# Patient Record
Sex: MALE | Race: WHITE | NOT HISPANIC OR LATINO | Employment: PART TIME | URBAN - METROPOLITAN AREA
[De-identification: names, ages, dates, MRNs, and addresses within clinical notes are randomized per-mention and may not be internally consistent; named-entity substitution may affect disease eponyms.]

---

## 2019-05-08 ENCOUNTER — HOSPITAL ENCOUNTER (EMERGENCY)
Facility: CLINIC | Age: 24
Discharge: HOME | End: 2019-05-08
Attending: EMERGENCY MEDICINE | Admitting: EMERGENCY MEDICINE
Payer: MEDICAID

## 2019-05-08 VITALS
OXYGEN SATURATION: 96 % | TEMPERATURE: 97.3 F | SYSTOLIC BLOOD PRESSURE: 138 MMHG | WEIGHT: 133 LBS | RESPIRATION RATE: 16 BRPM | HEART RATE: 119 BPM | DIASTOLIC BLOOD PRESSURE: 80 MMHG

## 2019-05-08 DIAGNOSIS — F11.10 OPIOID ABUSE (H): ICD-10-CM

## 2019-05-08 DIAGNOSIS — F10.930 ALCOHOL WITHDRAWAL SYNDROME WITHOUT COMPLICATION (H): ICD-10-CM

## 2019-05-08 LAB
ALCOHOL BREATH TEST: 0 (ref 0–0.01)
AMPHETAMINES UR QL SCN: NEGATIVE
BARBITURATES UR QL: NEGATIVE
BENZODIAZ UR QL: POSITIVE
CANNABINOIDS UR QL SCN: NEGATIVE
COCAINE UR QL: NEGATIVE
ETHANOL UR QL SCN: NEGATIVE
OPIATES UR QL SCN: POSITIVE

## 2019-05-08 PROCEDURE — 25000132 ZZH RX MED GY IP 250 OP 250 PS 637: Performed by: EMERGENCY MEDICINE

## 2019-05-08 PROCEDURE — 82075 ASSAY OF BREATH ETHANOL: CPT

## 2019-05-08 PROCEDURE — 80307 DRUG TEST PRSMV CHEM ANLYZR: CPT | Performed by: EMERGENCY MEDICINE

## 2019-05-08 PROCEDURE — 99283 EMERGENCY DEPT VISIT LOW MDM: CPT | Mod: Z6 | Performed by: EMERGENCY MEDICINE

## 2019-05-08 PROCEDURE — 99283 EMERGENCY DEPT VISIT LOW MDM: CPT

## 2019-05-08 PROCEDURE — 80320 DRUG SCREEN QUANTALCOHOLS: CPT | Performed by: EMERGENCY MEDICINE

## 2019-05-08 RX ORDER — LANOLIN ALCOHOL/MO/W.PET/CERES
100 CREAM (GRAM) TOPICAL ONCE
Status: COMPLETED | OUTPATIENT
Start: 2019-05-08 | End: 2019-05-08

## 2019-05-08 RX ORDER — FOLIC ACID 1 MG/1
1 TABLET ORAL ONCE
Status: COMPLETED | OUTPATIENT
Start: 2019-05-08 | End: 2019-05-08

## 2019-05-08 RX ORDER — MULTIPLE VITAMINS W/ MINERALS TAB 9MG-400MCG
1 TAB ORAL ONCE
Status: COMPLETED | OUTPATIENT
Start: 2019-05-08 | End: 2019-05-08

## 2019-05-08 RX ADMIN — MULTIPLE VITAMINS W/ MINERALS TAB 1 TABLET: TAB at 19:24

## 2019-05-08 RX ADMIN — Medication 100 MG: at 19:24

## 2019-05-08 RX ADMIN — FOLIC ACID 1 MG: 1 TABLET ORAL at 19:24

## 2019-05-08 ASSESSMENT — ENCOUNTER SYMPTOMS
FEVER: 0
ABDOMINAL PAIN: 0
SEIZURES: 0
SHORTNESS OF BREATH: 0

## 2019-05-08 NOTE — ED NOTES
Pt reports an allergy of a medication that causes hives, pt does not know the name of the medication. (started with an 'M' per pt).

## 2019-05-08 NOTE — ED AVS SNAPSHOT
[facility], Emergency Department  [address]  [facility] [address]  Phone:  [phone]  Fax:  [phone]                                    Jaime Mccormick   MRN: [id number]    Department:  [facility], Emergency Department   Date of Visit:  5/8/2019           After Visit Summary Signature Page    I have received my discharge instructions, and my questions have been answered. I have discussed any challenges I see with this plan with the nurse or doctor.    ..........................................................................................................................................  Patient/Patient Representative Signature      ..........................................................................................................................................  Patient Representative Print Name and Relationship to Patient    ..................................................               ................................................  Date                                   Time    ..........................................................................................................................................  Reviewed by Signature/Title    ...................................................              ..............................................  Date                                               Time          22EPIC Rev 08/18

## 2019-05-09 ENCOUNTER — HOSPITAL ENCOUNTER (INPATIENT)
Facility: CLINIC | Age: 24
LOS: 5 days | Discharge: PSYCHIATRIC OR SUBSTANCE USE FACILITY | End: 2019-05-14
Attending: FAMILY MEDICINE | Admitting: PSYCHIATRY & NEUROLOGY
Payer: MEDICAID

## 2019-05-09 ENCOUNTER — TELEPHONE (OUTPATIENT)
Dept: BEHAVIORAL HEALTH | Facility: CLINIC | Age: 24
End: 2019-05-09

## 2019-05-09 DIAGNOSIS — F10.239 ALCOHOL DEPENDENCE WITH WITHDRAWAL WITH COMPLICATION (H): ICD-10-CM

## 2019-05-09 DIAGNOSIS — F11.20 UNCOMPLICATED OPIOID DEPENDENCE (H): ICD-10-CM

## 2019-05-09 PROBLEM — F10.939 ALCOHOL WITHDRAWAL (H): Status: ACTIVE | Noted: 2019-05-09

## 2019-05-09 LAB
ALBUMIN SERPL-MCNC: 4.5 G/DL (ref 3.4–5)
ALCOHOL BREATH TEST: 0 (ref 0–0.01)
ALP SERPL-CCNC: 100 U/L (ref 40–150)
ALT SERPL W P-5'-P-CCNC: 59 U/L (ref 0–70)
AMPHETAMINES UR QL SCN: NEGATIVE
ANION GAP SERPL CALCULATED.3IONS-SCNC: 9 MMOL/L (ref 3–14)
AST SERPL W P-5'-P-CCNC: 51 U/L (ref 0–45)
BARBITURATES UR QL: NEGATIVE
BASOPHILS # BLD AUTO: 0 10E9/L (ref 0–0.2)
BASOPHILS NFR BLD AUTO: 0.2 %
BENZODIAZ UR QL: POSITIVE
BILIRUB SERPL-MCNC: 0.4 MG/DL (ref 0.2–1.3)
BUN SERPL-MCNC: 7 MG/DL (ref 7–30)
CALCIUM SERPL-MCNC: 9.2 MG/DL (ref 8.5–10.1)
CANNABINOIDS UR QL SCN: NEGATIVE
CHLORIDE SERPL-SCNC: 101 MMOL/L (ref 94–109)
CO2 SERPL-SCNC: 28 MMOL/L (ref 20–32)
COCAINE UR QL: NEGATIVE
CREAT SERPL-MCNC: 0.78 MG/DL (ref 0.66–1.25)
DIFFERENTIAL METHOD BLD: ABNORMAL
EOSINOPHIL # BLD AUTO: 0.1 10E9/L (ref 0–0.7)
EOSINOPHIL NFR BLD AUTO: 0.5 %
ERYTHROCYTE [DISTWIDTH] IN BLOOD BY AUTOMATED COUNT: 13.1 % (ref 10–15)
ETHANOL UR QL SCN: NEGATIVE
GFR SERPL CREATININE-BSD FRML MDRD: >90 ML/MIN/{1.73_M2}
GGT SERPL-CCNC: 289 U/L (ref 0–75)
GLUCOSE SERPL-MCNC: 77 MG/DL (ref 70–99)
HCT VFR BLD AUTO: 37.6 % (ref 40–53)
HGB BLD-MCNC: 12.8 G/DL (ref 13.3–17.7)
IMM GRANULOCYTES # BLD: 0 10E9/L (ref 0–0.4)
IMM GRANULOCYTES NFR BLD: 0.3 %
LYMPHOCYTES # BLD AUTO: 2 10E9/L (ref 0.8–5.3)
LYMPHOCYTES NFR BLD AUTO: 17.3 %
MCH RBC QN AUTO: 33.7 PG (ref 26.5–33)
MCHC RBC AUTO-ENTMCNC: 34 G/DL (ref 31.5–36.5)
MCV RBC AUTO: 99 FL (ref 78–100)
MONOCYTES # BLD AUTO: 1.1 10E9/L (ref 0–1.3)
MONOCYTES NFR BLD AUTO: 10.1 %
NEUTROPHILS # BLD AUTO: 8.1 10E9/L (ref 1.6–8.3)
NEUTROPHILS NFR BLD AUTO: 71.6 %
NRBC # BLD AUTO: 0 10*3/UL
NRBC BLD AUTO-RTO: 0 /100
OPIATES UR QL SCN: POSITIVE
PLATELET # BLD AUTO: 254 10E9/L (ref 150–450)
POTASSIUM SERPL-SCNC: 3.4 MMOL/L (ref 3.4–5.3)
PROT SERPL-MCNC: 7.9 G/DL (ref 6.8–8.8)
RBC # BLD AUTO: 3.8 10E12/L (ref 4.4–5.9)
SODIUM SERPL-SCNC: 138 MMOL/L (ref 133–144)
TSH SERPL DL<=0.005 MIU/L-ACNC: 1.19 MU/L (ref 0.4–4)
WBC # BLD AUTO: 11.3 10E9/L (ref 4–11)

## 2019-05-09 PROCEDURE — 80307 DRUG TEST PRSMV CHEM ANLYZR: CPT | Performed by: FAMILY MEDICINE

## 2019-05-09 PROCEDURE — 85025 COMPLETE CBC W/AUTO DIFF WBC: CPT | Performed by: FAMILY MEDICINE

## 2019-05-09 PROCEDURE — 25000132 ZZH RX MED GY IP 250 OP 250 PS 637: Performed by: PSYCHIATRY & NEUROLOGY

## 2019-05-09 PROCEDURE — 82977 ASSAY OF GGT: CPT | Performed by: FAMILY MEDICINE

## 2019-05-09 PROCEDURE — 25000128 H RX IP 250 OP 636: Performed by: FAMILY MEDICINE

## 2019-05-09 PROCEDURE — 96376 TX/PRO/DX INJ SAME DRUG ADON: CPT | Performed by: FAMILY MEDICINE

## 2019-05-09 PROCEDURE — 80320 DRUG SCREEN QUANTALCOHOLS: CPT | Performed by: FAMILY MEDICINE

## 2019-05-09 PROCEDURE — 96361 HYDRATE IV INFUSION ADD-ON: CPT | Performed by: FAMILY MEDICINE

## 2019-05-09 PROCEDURE — 96374 THER/PROPH/DIAG INJ IV PUSH: CPT | Performed by: FAMILY MEDICINE

## 2019-05-09 PROCEDURE — 12800008 ZZH R&B CD ADULT

## 2019-05-09 PROCEDURE — 25800030 ZZH RX IP 258 OP 636: Performed by: FAMILY MEDICINE

## 2019-05-09 PROCEDURE — 99285 EMERGENCY DEPT VISIT HI MDM: CPT | Mod: Z6 | Performed by: FAMILY MEDICINE

## 2019-05-09 PROCEDURE — HZ2ZZZZ DETOXIFICATION SERVICES FOR SUBSTANCE ABUSE TREATMENT: ICD-10-PCS | Performed by: PSYCHIATRY & NEUROLOGY

## 2019-05-09 PROCEDURE — 84443 ASSAY THYROID STIM HORMONE: CPT | Performed by: FAMILY MEDICINE

## 2019-05-09 PROCEDURE — 80053 COMPREHEN METABOLIC PANEL: CPT | Performed by: FAMILY MEDICINE

## 2019-05-09 PROCEDURE — 99285 EMERGENCY DEPT VISIT HI MDM: CPT | Mod: 25 | Performed by: FAMILY MEDICINE

## 2019-05-09 RX ORDER — NALOXONE HYDROCHLORIDE 0.4 MG/ML
.1-.4 INJECTION, SOLUTION INTRAMUSCULAR; INTRAVENOUS; SUBCUTANEOUS
Status: DISCONTINUED | OUTPATIENT
Start: 2019-05-09 | End: 2019-05-14 | Stop reason: HOSPADM

## 2019-05-09 RX ORDER — LORAZEPAM 2 MG/ML
1 INJECTION INTRAMUSCULAR ONCE
Status: COMPLETED | OUTPATIENT
Start: 2019-05-09 | End: 2019-05-09

## 2019-05-09 RX ORDER — NICOTINE 21 MG/24HR
1 PATCH, TRANSDERMAL 24 HOURS TRANSDERMAL DAILY
Status: DISCONTINUED | OUTPATIENT
Start: 2019-05-09 | End: 2019-05-09 | Stop reason: CLARIF

## 2019-05-09 RX ORDER — DIAZEPAM 5 MG
5-20 TABLET ORAL EVERY 30 MIN PRN
Status: DISCONTINUED | OUTPATIENT
Start: 2019-05-09 | End: 2019-05-14 | Stop reason: HOSPADM

## 2019-05-09 RX ORDER — LANOLIN ALCOHOL/MO/W.PET/CERES
100 CREAM (GRAM) TOPICAL DAILY
Status: DISCONTINUED | OUTPATIENT
Start: 2019-05-10 | End: 2019-05-14 | Stop reason: HOSPADM

## 2019-05-09 RX ORDER — ACETAMINOPHEN 325 MG/1
650 TABLET ORAL EVERY 4 HOURS PRN
Status: DISCONTINUED | OUTPATIENT
Start: 2019-05-09 | End: 2019-05-14 | Stop reason: HOSPADM

## 2019-05-09 RX ORDER — TRAZODONE HYDROCHLORIDE 50 MG/1
50 TABLET, FILM COATED ORAL
Status: DISCONTINUED | OUTPATIENT
Start: 2019-05-09 | End: 2019-05-14 | Stop reason: HOSPADM

## 2019-05-09 RX ORDER — ALUMINA, MAGNESIA, AND SIMETHICONE 2400; 2400; 240 MG/30ML; MG/30ML; MG/30ML
30 SUSPENSION ORAL EVERY 4 HOURS PRN
Status: DISCONTINUED | OUTPATIENT
Start: 2019-05-09 | End: 2019-05-14 | Stop reason: HOSPADM

## 2019-05-09 RX ORDER — LOPERAMIDE HCL 2 MG
2 CAPSULE ORAL 4 TIMES DAILY PRN
Status: DISCONTINUED | OUTPATIENT
Start: 2019-05-09 | End: 2019-05-14 | Stop reason: HOSPADM

## 2019-05-09 RX ORDER — HYDROXYZINE HYDROCHLORIDE 25 MG/1
25 TABLET, FILM COATED ORAL EVERY 4 HOURS PRN
Status: DISCONTINUED | OUTPATIENT
Start: 2019-05-09 | End: 2019-05-14 | Stop reason: HOSPADM

## 2019-05-09 RX ORDER — BUPRENORPHINE 2 MG/1
2 TABLET SUBLINGUAL 4 TIMES DAILY
Status: DISCONTINUED | OUTPATIENT
Start: 2019-05-09 | End: 2019-05-13

## 2019-05-09 RX ORDER — ONDANSETRON 4 MG/1
4 TABLET, ORALLY DISINTEGRATING ORAL EVERY 6 HOURS PRN
Status: DISCONTINUED | OUTPATIENT
Start: 2019-05-09 | End: 2019-05-14 | Stop reason: HOSPADM

## 2019-05-09 RX ORDER — MULTIPLE VITAMINS W/ MINERALS TAB 9MG-400MCG
1 TAB ORAL DAILY
Status: DISCONTINUED | OUTPATIENT
Start: 2019-05-10 | End: 2019-05-14 | Stop reason: HOSPADM

## 2019-05-09 RX ORDER — IBUPROFEN 600 MG/1
600 TABLET, FILM COATED ORAL EVERY 6 HOURS PRN
Status: DISCONTINUED | OUTPATIENT
Start: 2019-05-09 | End: 2019-05-14 | Stop reason: HOSPADM

## 2019-05-09 RX ORDER — BISACODYL 10 MG
10 SUPPOSITORY, RECTAL RECTAL DAILY PRN
Status: DISCONTINUED | OUTPATIENT
Start: 2019-05-09 | End: 2019-05-14 | Stop reason: HOSPADM

## 2019-05-09 RX ORDER — SODIUM CHLORIDE 9 MG/ML
1000 INJECTION, SOLUTION INTRAVENOUS CONTINUOUS
Status: DISCONTINUED | OUTPATIENT
Start: 2019-05-09 | End: 2019-05-09 | Stop reason: CLARIF

## 2019-05-09 RX ORDER — FOLIC ACID 1 MG/1
1 TABLET ORAL DAILY
Status: DISCONTINUED | OUTPATIENT
Start: 2019-05-10 | End: 2019-05-14 | Stop reason: HOSPADM

## 2019-05-09 RX ADMIN — DIAZEPAM 10 MG: 5 TABLET ORAL at 20:21

## 2019-05-09 RX ADMIN — DIAZEPAM 10 MG: 5 TABLET ORAL at 17:20

## 2019-05-09 RX ADMIN — BUPRENORPHINE HCL 2 MG: 2 TABLET SUBLINGUAL at 20:21

## 2019-05-09 RX ADMIN — LORAZEPAM 1 MG: 2 INJECTION INTRAMUSCULAR; INTRAVENOUS at 13:07

## 2019-05-09 RX ADMIN — BUPRENORPHINE HCL 2 MG: 2 TABLET SUBLINGUAL at 17:05

## 2019-05-09 RX ADMIN — SODIUM CHLORIDE 1000 ML: 9 INJECTION, SOLUTION INTRAVENOUS at 13:06

## 2019-05-09 RX ADMIN — LORAZEPAM 1 MG: 2 INJECTION INTRAMUSCULAR; INTRAVENOUS at 14:57

## 2019-05-09 RX ADMIN — SODIUM CHLORIDE 1000 ML: 9 INJECTION, SOLUTION INTRAVENOUS at 14:02

## 2019-05-09 RX ADMIN — NICOTINE 1 PATCH: 14 PATCH, EXTENDED RELEASE TRANSDERMAL at 17:19

## 2019-05-09 ASSESSMENT — ACTIVITIES OF DAILY LIVING (ADL)
DRESS: INDEPENDENT
ORAL_HYGIENE: INDEPENDENT
HYGIENE/GROOMING: INDEPENDENT

## 2019-05-09 ASSESSMENT — ENCOUNTER SYMPTOMS: NAUSEA: 0

## 2019-05-09 NOTE — PROGRESS NOTES
05/09/19 1559   Patient Belongings   Did you bring any home meds/supplements to the hospital?  No   Patient Belongings other (see comments)   Belongings Search Yes   Clothing Search Yes   Second Staff Omer     STORAGE BIN:   duffle bag, shorts, sweatshirt, 3 pants, shoes, cigarettes, wallet, belt, papers, pants with string,    MED ROOM BIN:   cell phone,  cord    SECURITY:   [place] id, ck for $25  A             Admission:  I am responsible for any personal items that are not sent to the safe or pharmacy.  [facility] is not responsible for loss, theft or damage of any property in my possession.    Signature:  _________________________________ Date: _______  Time: _____                                              Staff Signature:  ____________________________ Date: ________  Time: _____      2nd Staff person, if patient is unable/unwilling to sign:    Signature: ________________________________ Date: ________  Time: _____   Discharge:  [facility] has returned all of my personal belongings:    Signature: _________________________________ Date: ________  Time: _____                                          Staff Signature:  ____________________________ Date: ________  Time: _____

## 2019-05-09 NOTE — ED PROVIDER NOTES
[facility] EMERGENCY DEPARTMENT ([facility])     May 9, 2019    History     Chief Complaint   Patient presents with     Drug / Alcohol Assessment     Heroin snorts, uses 2 gm per day.  Last used yesterday @ about 1400.  Alcohol last drink 2 days ago and states 2.5 liter split between 2 people.  Deneis any other drugs.     HPI  Jaime Mccormick is a 24 year old male who presents the ED seeking detox from alcohol and heroin.  Patient that he has been getting 2 g of heroin daily for the past 2 years.  He states his last use was yesterday 1 month p.m.  He also reports that he has been drinking 2.5 L of boxed wine daily for the past 1.5 years.  He denies previous history of alcohol withdrawal seizures.  States his last drink was 2 days ago.  He otherwise denies nausea, other medical problems, or other substance use.    Per chart review, patient was seen in the ED yesterday again seeking detox, but there are no beds available, and patient was not interested in going to [facility] or [address] [place] [address] for detox.  PAST MEDICAL HISTORY  Past Medical History:   Diagnosis Date     Anxiety      Chronic kidney disease      Depressive disorder      PAST SURGICAL HISTORY  History reviewed. No pertinent surgical history.  FAMILY HISTORY  No family history on file.  SOCIAL HISTORY  Social History     Tobacco Use     Smoking status: Current Every Day Smoker     Packs/day: 0.50     Smokeless tobacco: Never Used   Substance Use Topics     Alcohol use: Yes     MEDICATIONS  Current Facility-Administered Medications   Medication     acetaminophen (TYLENOL) tablet 650 mg     alum & mag hydroxide-simethicone (MYLANTA ES/MAALOX  ES) suspension 30 mL     atenolol (TENORMIN) tablet 50 mg     bisacodyl (DULCOLAX) Suppository 10 mg     buprenorphine (SUBUTEX) sublingual tablet 2 mg     diazepam (VALIUM) tablet 5-20 mg     escitalopram (LEXAPRO) tablet 10 mg     folic acid (FOLVITE) tablet 1 mg     hydrOXYzine (ATARAX) tablet 25 mg      ibuprofen (ADVIL/MOTRIN) tablet 600 mg     loperamide (IMODIUM) capsule 2 mg     magnesium hydroxide (MILK OF MAGNESIA) suspension 30 mL     multivitamin w/minerals (THERA-VIT-M) tablet 1 tablet     naloxone (NARCAN) injection 0.1-0.4 mg     nicotine (NICORETTE) gum 2-4 mg     ondansetron (ZOFRAN-ODT) ODT tab 4 mg     traZODone (DESYREL) tablet 50 mg     vitamin B1 (THIAMINE) tablet 100 mg     ALLERGIES  Allergies   Allergen Reactions     Amoxicillin Hives         I have reviewed the Medications, Allergies, Past Medical and Surgical History, and Social History in the Epic system.    Review of Systems   Gastrointestinal: Negative for nausea.   All other systems reviewed and are negative.      Physical Exam   BP: (!) 175/107  Pulse: 138  Heart Rate: 142  Temp: 99.7  F (37.6  C)  Resp: 20  Weight: 59.5 kg (131 lb 3 oz)  SpO2: 100 %      Physical Exam   Constitutional: He is oriented to person, place, and time. No distress.   HENT:   Head: Atraumatic.   Mouth/Throat: Oropharynx is clear and moist.   Eyes: Pupils are equal, round, and reactive to light. No scleral icterus.   Cardiovascular: Normal heart sounds and intact distal pulses.   Pulmonary/Chest: Breath sounds normal. No respiratory distress.   Abdominal: Soft. Bowel sounds are normal. There is no tenderness.   Musculoskeletal: He exhibits no edema or tenderness.   Neurological: He is alert and oriented to person, place, and time. He exhibits normal muscle tone. Coordination normal.   Skin: Skin is warm. No rash noted. He is not diaphoretic.   Psychiatric: His mood appears anxious.       ED Course        Procedures   12:37 PM  The patient was seen and examined by Dr. Wilkes in Room 7.            Critical Care time:  none        Results for orders placed or performed during the hospital encounter of 05/09/19   CBC with platelets differential   Result Value Ref Range    WBC 11.3 (H) 4.0 - 11.0 10e9/L    RBC Count 3.80 (L) 4.4 - 5.9 10e12/L    Hemoglobin 12.8  (L) 13.3 - 17.7 g/dL    Hematocrit 37.6 (L) 40.0 - 53.0 %    MCV 99 78 - 100 fl    MCH 33.7 (H) 26.5 - 33.0 pg    MCHC 34.0 31.5 - 36.5 g/dL    RDW 13.1 10.0 - 15.0 %    Platelet Count 254 150 - 450 10e9/L    Diff Method Automated Method     % Neutrophils 71.6 %    % Lymphocytes 17.3 %    % Monocytes 10.1 %    % Eosinophils 0.5 %    % Basophils 0.2 %    % Immature Granulocytes 0.3 %    Nucleated RBCs 0 0 /100    Absolute Neutrophil 8.1 1.6 - 8.3 10e9/L    Absolute Lymphocytes 2.0 0.8 - 5.3 10e9/L    Absolute Monocytes 1.1 0.0 - 1.3 10e9/L    Absolute Eosinophils 0.1 0.0 - 0.7 10e9/L    Absolute Basophils 0.0 0.0 - 0.2 10e9/L    Abs Immature Granulocytes 0.0 0 - 0.4 10e9/L    Absolute Nucleated RBC 0.0    Comprehensive metabolic panel   Result Value Ref Range    Sodium 138 133 - 144 mmol/L    Potassium 3.4 3.4 - 5.3 mmol/L    Chloride 101 94 - 109 mmol/L    Carbon Dioxide 28 20 - 32 mmol/L    Anion Gap 9 3 - 14 mmol/L    Glucose 77 70 - 99 mg/dL    Urea Nitrogen 7 7 - 30 mg/dL    Creatinine 0.78 0.66 - 1.25 mg/dL    GFR Estimate >90 >60 mL/min/[1.73_m2]    GFR Estimate If Black >90 >60 mL/min/[1.73_m2]    Calcium 9.2 8.5 - 10.1 mg/dL    Bilirubin Total 0.4 0.2 - 1.3 mg/dL    Albumin 4.5 3.4 - 5.0 g/dL    Protein Total 7.9 6.8 - 8.8 g/dL    Alkaline Phosphatase 100 40 - 150 U/L    ALT 59 0 - 70 U/L    AST 51 (H) 0 - 45 U/L   TSH   Result Value Ref Range    TSH 1.19 0.40 - 4.00 mU/L   Drug abuse screen 6 urine (tox)   Result Value Ref Range    Amphetamine Qual Urine Negative NEG^Negative    Barbiturates Qual Urine Negative NEG^Negative    Benzodiazepine Qual Urine Positive (A) NEG^Negative    Cannabinoids Qual Urine Negative NEG^Negative    Cocaine Qual Urine Negative NEG^Negative    Ethanol Qual Urine Negative NEG^Negative    Opiates Qualitative Urine Positive (A) NEG^Negative   GGT   Result Value Ref Range     (H) 0 - 75 U/L   Internal Medicine Adult IP Consult for BEH Detox on 3A: Patient to be seen: Routine  within 24 hrs; Call back #: [phone]; comanage alcohol withdrawal; Consultant may enter orders: Yes; Requesting provider? Other supervising provider; Name: Dr Edu Banks, Dilma Vera PA-C     5/10/2019 10:01 AM  [facility] [place]  Consult Note - Hospitalist Service     Date of Admission:  5/9/2019  Consult Requested by: psychiatry  Reason for Consult: detox    Assessment & Plan   Jaime Mccormick is a 24 year old male with a hx of   polysubstance abuse admitted to 3A seeking detox from heroin and   meth.     Alcohol abuse and withdrawal. Consuming 2.5L boxed wine daily for   >1 year. No hx of alcohol withdrawal seizures or DTs. Agree with   MSSA, folic acid, MV, and thiamine as you are.     Heroin abuse and withdrawal. Using 2g heroin daily for >2 years.   Denies IVDU.     Tachycardia. Reg rhythm on exam. Likely 2/2 withdrawal. No cp,   sob, dyspnea, palpitations.     Mild transminitis.  and AST 51 c/w alcohol use. ALT, AP,   and Tbili wnl. No need to monitor further while inpatient.     Mild leukocytosis. WBC 11.3. No s/sx of infection. Likely 2/2   acute stress in setting of withdrawal.     Dilma Banks PA-C  [facility] [place]    __________________________________________________________________  ____    Chief Complaint   Alcohol and heroin abuse    History of Present Illness   Jaime Mccormick is a 24 year old male with a hx of   polysubstance abuse admitted to 3A seeking detox from heroin and   meth. Patient has been snorting/smoking heroin daily for 2 or so   years. He denies any IVDU. He has also been drinking alcohol   daily for over 1 year. He denies hx of seizures or DTs from   withdrawal. He is currently going through withdrawals and does   not feel well. He is not eating right now but tolerating liquids.   His heart rate has been fast, but he denies any chest pain,   palpitations, sob, or  dyspnea. He has no hx or cardiac,   pulmonary, or endocrine disease. He has no new rashes, urinary   symptoms, cough, sore throat, etc c/w infection. No other acute   concerns at this time.     Review of Systems   The 10 point Review of Systems is negative other than noted in   the HPI or here.     Past Medical History    I have reviewed this patient's medical history and updated it   with pertinent information if needed.   Past Medical History:   Diagnosis Date     Anxiety      Chronic kidney disease      Depressive disorder        Past Surgical History   I have reviewed this patient's surgical history and updated it   with pertinent information if needed.  History reviewed. No pertinent surgical history.    Social History   I have reviewed this patient's social history and updated it with   pertinent information if needed.  Social History     Tobacco Use     Smoking status: Current Every Day Smoker     Packs/day: 0.50     Smokeless tobacco: Never Used   Substance Use Topics     Alcohol use: Yes     Drug use: Yes     Types: Opiates       Family History   Fam hx reviewed, non contributory    Medications   I have reviewed this patient's current medications    Allergies   Allergies   Allergen Reactions     Amoxicillin Hives       Physical Exam   Vital Signs: Temp: 98.6  F (37  C) Temp src: Oral BP: (!) 137/92   Pulse: 126 Heart Rate: 94 Resp: 16 SpO2: 100 % O2 Device: None   (Room air)    Weight: 131 lbs 3 oz  GENERAL: Alert and oriented x 3. NAD. Appears in mod withdrawal.  HEENT: MMM  CV: tachycardic, regular rhythm  RESPIRATORY: Effort normal on RA. Lungs CTAB.  GI: Abdomen soft and non distended with normoactive bowel sounds   present in all quadrants. No tenderness, rebound, guarding.   NEUROLOGICAL: No focal deficits. Moves all extremities. Mild   tremors in UE.  SKIN: No jaundice or rashes on exposed areas of skin.     Alcohol breath test POCT   Result Value Ref Range    Alcohol Breath Test 0.00 0.00 - 0.01               Assessments & Plan (with Medical Decision Making)       I have reviewed the nursing notes.    I have reviewed the findings, diagnosis, plan and need for follow up with the patient.    Patient with alcohol dependence as well as heroin dependence at this time will be seeking detox will be admitted to station 3 a for further evaluation and stabilization.    Final diagnoses:   Alcohol dependence with withdrawal with complication (H)   Uncomplicated opioid dependence (H)     IDavid, am serving as a trained medical scribe to document services personally performed by Branden Wilkes MD, based on the provider's statements to me.      Branden RODRIGUEZ MD, was physically present and have reviewed and verified the accuracy of this note documented by David Burroughs    5/9/2019   [facility] [place], EMERGENCY DEPARTMENT     Branden Wilkes MD  05/10/19 [id number]

## 2019-05-09 NOTE — ED NOTES
ED to Behavioral Floor Handoff    SITUATION  Jaime Mccormick is a 24 year old male who speaks English and lives in a home unknown The patient arrived in the ED by private car from home with a complaint of Drug / Alcohol Assessment (Heroin snorts, uses 2 gm per day.  Last used yesterday @ about 1400.  Alcohol last drink 2 days ago and states 2.5 liter split between 2 people.  Deneis any other drugs.)  .The patient's current symptoms started/worsened 2 day(s) ago and during this time the symptoms have increased.   In the ED, pt was diagnosed with   Final diagnoses:   None        Initial vitals were: BP: (!) 175/107  Pulse: 138  Heart Rate: 142  Temp: 99.7  F (37.6  C)  Resp: 20  Weight: 59.5 kg (131 lb 3 oz)  SpO2: 100 %   --------  Is the patient diabetic? No   If yes, last blood glucose? --     If yes, was this treated in the ED? --  --------  Is the patient inebriated (ETOH) No or Impaired on other substances? No  MSSA done? Yes  Last MSSA score: --    Were withdrawal symptoms treated? Yes  Does the patient have a seizure history? No. If yes, date of most recent seizure--  --------  Is the patient patient experiencing suicidal ideation? denies current or recent suicidal ideation     Homicidal ideation? denies current or recent homicidal ideation or behaviors.    Self-injurious behavior/urges? denies current or recent self injurious behavior or ideation.  ------  Was pt aggressive in the ED No  Was a code called No  Is the pt now cooperative? Yes  -------  Meds given in ED:   Medications   0.9% sodium chloride BOLUS (0 mLs Intravenous Stopped 5/9/19 1358)     Followed by   sodium chloride 0.9% infusion (1,000 mLs Intravenous New Bag 5/9/19 1402)   LORazepam (ATIVAN) injection 1 mg (1 mg Intravenous Given 5/9/19 1307)   LORazepam (ATIVAN) injection 1 mg (1 mg Intravenous Given 5/9/19 1457)      Family present during ED course? No  Family currently present? No    BACKGROUND  Does the patient have a cognitive  impairment or developmental disability? No  Allergies:   Allergies   Allergen Reactions     Amoxicillin Hives   .   Social demographics are   Social History     Socioeconomic History     Marital status: Single     Spouse name: None     Number of children: None     Years of education: None     Highest education level: None   Occupational History     None   Social Needs     Financial resource strain: None     Food insecurity:     Worry: None     Inability: None     Transportation needs:     Medical: None     Non-medical: None   Tobacco Use     Smoking status: Current Every Day Smoker     Packs/day: 0.50     Smokeless tobacco: Never Used   Substance and Sexual Activity     Alcohol use: Yes     Drug use: Yes     Types: Opiates     Sexual activity: None   Lifestyle     Physical activity:     Days per week: None     Minutes per session: None     Stress: None   Relationships     Social connections:     Talks on phone: None     Gets together: None     Attends [employer] service: None     Active member of club or organization: None     Attends meetings of clubs or organizations: None     Relationship status: None     Intimate partner violence:     Fear of current or ex partner: None     Emotionally abused: None     Physically abused: None     Forced sexual activity: None   Other Topics Concern     None   Social History Narrative     None        ASSESSMENT  Labs results   Labs Ordered and Resulted from Time of ED Arrival Up to the Time of Departure from the ED   CBC WITH PLATELETS DIFFERENTIAL - Abnormal; Notable for the following components:       Result Value    WBC 11.3 (*)     RBC Count 3.80 (*)     Hemoglobin 12.8 (*)     Hematocrit 37.6 (*)     MCH 33.7 (*)     All other components within normal limits   COMPREHENSIVE METABOLIC PANEL - Abnormal; Notable for the following components:    AST 51 (*)     All other components within normal limits   ALCOHOL BREATH TEST POCT - Normal   TSH      Imaging Studies: No results found  for this or any previous visit (from the past 24 hour(s)).   Most recent vital signs BP (!) 131/105   Pulse 99   Temp 98.6  F (37  C) (Oral)   Resp 18   Wt 59.5 kg (131 lb 3 oz)   SpO2 100%    Abnormal labs/tests/findings requiring intervention:---   Pain control: pt had none  Nausea control: pt had none    RECOMMENDATION  Are any infection precautions needed (MRSA, VRE, etc.)? No If yes, what infection? --  ---  Does the patient have mobility issues? independently. If yes, what device does the pt use? ---  ---  Is patient on 72 hour hold or commitment? No If on 72 hour hold, have hold and rights been given to patient? N/A  Are admitting orders written if after 10 p.m. ?N/A  Tasks needing to be completed:---     Beti Peter   [facility]-- [phone]   [phone] [facility] ED

## 2019-05-09 NOTE — ED PROVIDER NOTES
[facility] ([facility])    5/08/19       History     Chief Complaint   Patient presents with     Addiction Problem     Requesting a detox bed for Heroin (2 grams snorts daily- last use was 1 hour before arrival). ETOH (2.5 Liters daily- last drink this morning). Denies seizures.      The history is provided by the patient.     Jaime Mccormick is a 24 year old male who presents to the Emergency Department seeking detox from alcohol and heroin.  Patient snorts 2 g of heroin a day.  He reports that his last use was approximately 1 hour prior to arrival.  The patient also drinks approximately 2.5 L of wine a day with his last drink earlier this afternoon.  The patient denies any history of withdrawal seizures.  He denies any suicidal ideation.  The patient does note that he occasionally uses Xanax as well.  He reports that he used a half a tablet yesterday and a small amount this morning at approximately 9 AM.    Social: Patient presents with his romantic partner, who is also here seeking detox in the Emergency Department.  Patient is currently unemployed.    I have reviewed the Medications, Allergies, Past Medical and Surgical History, and Social History in the Epic system.    History reviewed. No pertinent past medical history.    History reviewed. No pertinent surgical history.    History reviewed. No pertinent family history.    Social History     Tobacco Use     Smoking status: Current Every Day Smoker     Smokeless tobacco: Never Used   Substance Use Topics     Alcohol use: Yes       No current facility-administered medications for this encounter.      No current outpatient medications on file.        Allergies   Allergen Reactions     Amoxicillin Hives         Review of Systems   Constitutional: Negative for fever.   Respiratory: Negative for shortness of breath.    Cardiovascular: Negative for chest pain.   Gastrointestinal: Negative for abdominal pain.   Neurological: Negative  for seizures.   Psychiatric/Behavioral: Negative for suicidal ideas.   All other systems reviewed and are negative.      Physical Exam   BP: 136/77  Pulse: 119  Temp: 98.4  F (36.9  C)  Resp: 16  Weight: 60.3 kg (133 lb)  SpO2: 95 %      Physical Exam   Physical Exam   Constitutional:   well nourished, well developed, resting comfortably   HENT:   Head: Normocephalic and atraumatic.   Eyes: Conjunctivae are normal. Pupils are equal, round, and reactive to light.   pharynx has no erythema or exudate, mucous membranes are moist  Neck:   no adenopathy, no bony tenderness  Cardiovascular: tachycardia without murmurs or gallops  Pulmonary/Chest: Clear to auscultation bilaterally, with no wheezes or retractions. No respiratory distress.  GI: Soft with good bowel sounds.  Non-tender, non-distended, with no guarding, no rebound, no peritoneal signs.   Back:  No bony or CVA tenderness   Musculoskeletal:  no edema or clubbing   Skin: Skin is warm and dry.   Neurological: alert and oriented to person, place, and time. Nonfocal exam, tremulous  Psychiatric:  normal mood and affect.      ED Course   7:08 PM  The patient was seen and examined by Lanette Bailey MD in Room ED16C.        Procedures             Critical Care time:  none             Labs Ordered and Resulted from Time of ED Arrival Up to the Time of Departure from the ED   ALCOHOL BREATH TEST POCT - Normal   DRUG ABUSE SCREEN 6 CHEM DEP URINE ([facility])     Results for orders placed or performed during the hospital encounter of 05/08/19   Alcohol breath test POCT   Result Value Ref Range    Alcohol Breath Test 0.000 0.00 - 0.01           Assessments & Plan (with Medical Decision Making)       I have reviewed the nursing notes.  Emergency Department course:  The patient was seen and examined at 1910 pm.  Breathalyzer is 0.00.    The patient is tremulous and appears to be in alcohol withdrawal.  Unfortunately, there are no detox beds available here at [facility].   There was a bed available at [facility].  However, the patient did not want to go to [facility] detox.  He would like to be discharged home and try again tomorrow for detox bed here.  The patient is not intoxicated here and is able to make his own decisions.    Patient is a 24-year-old male with a history of alcohol and opiate abuse here for detox.  There are no detox beds available at [facility].  He does not want to go to [facility] detox or [address] [place] [address] detox.  He will be discharged home.  I gave him the phone number to mental health intake and he should keep calling to hold a detox bed.  He should return to the ED for concerns.  I have reviewed the findings, diagnosis, plan and need for follow up with the patient.       Medication List      There are no discharge medications for this visit.         Final diagnoses:   Alcohol withdrawal syndrome without complication (H)   Opioid abuse (H)     I, James Leiva, am serving as a trained medical scribe to document services personally performed by Lanette Bailey MD, based on the provider's statements to me.   Lanette RODRIGUEZ MD, was physically present and have reviewed and verified the accuracy of this note documented by James Leiva.  This note was created in part by the use of Dragon voice recognition dictation system. Inadvertent grammatical errors and typographical errors may still exist.  Lanette Bailey MD    5/8/2019   [facility], EMERGENCY DEPARTMENT     Lanette Bailey MD  05/09/19 0012

## 2019-05-09 NOTE — DISCHARGE INSTRUCTIONS
Call Mental Health Intake at [phone] to hold a Detox bed.  You may need to call several times until there is a bed available.  You were offered a detox bed at [facility] and did not want it.  Please return at any time for concerns

## 2019-05-09 NOTE — TELEPHONE ENCOUNTER
S: Dr. Christianson calling with clinical on this 24 year old male in [place] ED presenting for heroin and alcohol detox.     B: Pt snorts 2 grams of heroin per day, last use yesterday mid afternoon. Pt has been snorting heroin daily for approximately the past 2 years. Pt is also drinking alcohol on a daily basis and drinks approximately 1.25 L of wine daily. Last drink 2 days ago. Pt also abuses Xanax on occasion. Pt is tremulous in the ED and will receive Ativan per Dr. Wilkes. CBC, CMP and TSH ordered. UDS done yesterday when pt presented for a detox bed but one was not available. UDS at that time was positive for benzos and opiates. No acute mental health or medical concerns. Pt is ambulatory.    A: Voluntary.    R:

## 2019-05-09 NOTE — ED TRIAGE NOTES
Pt. Looking for detox. Uses heroin snorted about 2gm a day for about the last two years-last use was 5/8/19 at 2p. Pt. Also drinks alcohol, 2.5L of wine box between him and a friend daily-last drink was 5/7/19.

## 2019-05-10 PROCEDURE — 99207 ZZC CONSULT E&M CHANGED TO SUBSEQUENT LEVEL: CPT | Performed by: PHYSICIAN ASSISTANT

## 2019-05-10 PROCEDURE — 25000132 ZZH RX MED GY IP 250 OP 250 PS 637: Performed by: PSYCHIATRY & NEUROLOGY

## 2019-05-10 PROCEDURE — 99221 1ST HOSP IP/OBS SF/LOW 40: CPT | Mod: AI | Performed by: PSYCHIATRY & NEUROLOGY

## 2019-05-10 PROCEDURE — 99207 ZZC DOWN CODE DUE TO INITIAL EXAM: CPT | Performed by: PSYCHIATRY & NEUROLOGY

## 2019-05-10 PROCEDURE — 99232 SBSQ HOSP IP/OBS MODERATE 35: CPT | Performed by: PHYSICIAN ASSISTANT

## 2019-05-10 PROCEDURE — 12800008 ZZH R&B CD ADULT

## 2019-05-10 PROCEDURE — 25000131 ZZH RX MED GY IP 250 OP 636 PS 637: Performed by: PSYCHIATRY & NEUROLOGY

## 2019-05-10 RX ORDER — ESCITALOPRAM OXALATE 10 MG/1
10 TABLET ORAL DAILY
Status: DISCONTINUED | OUTPATIENT
Start: 2019-05-10 | End: 2019-05-14 | Stop reason: HOSPADM

## 2019-05-10 RX ORDER — ATENOLOL 50 MG/1
50 TABLET ORAL DAILY PRN
Status: DISCONTINUED | OUTPATIENT
Start: 2019-05-10 | End: 2019-05-14 | Stop reason: HOSPADM

## 2019-05-10 RX ADMIN — DIAZEPAM 10 MG: 5 TABLET ORAL at 11:57

## 2019-05-10 RX ADMIN — ESCITALOPRAM OXALATE 10 MG: 10 TABLET ORAL at 11:57

## 2019-05-10 RX ADMIN — BUPRENORPHINE HCL 2 MG: 2 TABLET SUBLINGUAL at 20:26

## 2019-05-10 RX ADMIN — TRAZODONE HYDROCHLORIDE 50 MG: 50 TABLET ORAL at 00:49

## 2019-05-10 RX ADMIN — DIAZEPAM 10 MG: 5 TABLET ORAL at 08:03

## 2019-05-10 RX ADMIN — DIAZEPAM 10 MG: 5 TABLET ORAL at 16:16

## 2019-05-10 RX ADMIN — BUPRENORPHINE HCL 2 MG: 2 TABLET SUBLINGUAL at 08:03

## 2019-05-10 RX ADMIN — BUPRENORPHINE HCL 2 MG: 2 TABLET SUBLINGUAL at 16:16

## 2019-05-10 RX ADMIN — DIAZEPAM 10 MG: 5 TABLET ORAL at 20:26

## 2019-05-10 RX ADMIN — ONDANSETRON 4 MG: 4 TABLET, ORALLY DISINTEGRATING ORAL at 12:00

## 2019-05-10 RX ADMIN — DIAZEPAM 10 MG: 5 TABLET ORAL at 00:49

## 2019-05-10 RX ADMIN — BUPRENORPHINE HCL 2 MG: 2 TABLET SUBLINGUAL at 11:57

## 2019-05-10 RX ADMIN — ATENOLOL 50 MG: 50 TABLET ORAL at 12:38

## 2019-05-10 ASSESSMENT — ACTIVITIES OF DAILY LIVING (ADL)
HYGIENE/GROOMING: INDEPENDENT
LAUNDRY: UNABLE TO COMPLETE
ORAL_HYGIENE: INDEPENDENT
DRESS: INDEPENDENT

## 2019-05-10 NOTE — PROGRESS NOTES
Pt. Anxious, tremulous and diaphoretic. MSSA 12 and 7; 10 x 1 of valium given. PRN trazodone given for sleep.

## 2019-05-10 NOTE — PROGRESS NOTES
MSSA 12 & 12, given Valium 10mg x2. COWS 14 & 10, continues with Buprenorphine 2mg QID. Jaime continues to complain of nausea, anxiety, and general discomfort, mildly tremulous. Continues to be tachycardic, given atenolol at 1230.  Pt denies SI/SIB, denies depression. Goal for today: complete paperwork, still in progress. Intake minimal due to nausea, denies emesis today. Suboxone maintenance appointment set for 5/21 at 3pm with Dr. Bonilla.    Assessment of pt's progress toward meeting careplan goals: improving.

## 2019-05-10 NOTE — CONSULTS
[facility], [place]  Consult Note - Hospitalist Service     Date of Admission:  5/9/2019  Consult Requested by: psychiatry  Reason for Consult: detox    Assessment & Plan   Jaime Mccormick is a 24 year old male with a hx of polysubstance abuse admitted to 3A seeking detox from heroin and meth.     Alcohol abuse and withdrawal. Consuming 2.5L boxed wine daily for >1 year. No hx of alcohol withdrawal seizures or DTs. Agree with MSSA, folic acid, MV, and thiamine as you are.     Heroin abuse and withdrawal. Using 2g heroin daily for >2 years. Denies IVDU.     Tachycardia. Reg rhythm on exam. Likely 2/2 withdrawal. No cp, sob, dyspnea, palpitations.     Mild transminitis.  and AST 51 c/w alcohol use. ALT, AP, and Tbili wnl. No need to monitor further while inpatient.     Mild leukocytosis. WBC 11.3. No s/sx of infection. Likely 2/2 acute stress in setting of withdrawal.     Dilma Banks PA-C  [facility], [place]    ______________________________________________________________________    Chief Complaint   Alcohol and heroin abuse    History of Present Illness   Jaime Mccormick is a 24 year old male with a hx of polysubstance abuse admitted to 3A seeking detox from heroin and meth. Patient has been snorting/smoking heroin daily for 2 or so years. He denies any IVDU. He has also been drinking alcohol daily for over 1 year. He denies hx of seizures or DTs from withdrawal. He is currently going through withdrawals and does not feel well. He is not eating right now but tolerating liquids. His heart rate has been fast, but he denies any chest pain, palpitations, sob, or dyspnea. He has no hx or cardiac, pulmonary, or endocrine disease. He has no new rashes, urinary symptoms, cough, sore throat, etc c/w infection. No other acute concerns at this time.     Review of Systems   The 10 point Review of Systems is negative other than noted in the  HPI or here.     Past Medical History    I have reviewed this patient's medical history and updated it with pertinent information if needed.   Past Medical History:   Diagnosis Date     Anxiety      Chronic kidney disease      Depressive disorder        Past Surgical History   I have reviewed this patient's surgical history and updated it with pertinent information if needed.  History reviewed. No pertinent surgical history.    Social History   I have reviewed this patient's social history and updated it with pertinent information if needed.  Social History     Tobacco Use     Smoking status: Current Every Day Smoker     Packs/day: 0.50     Smokeless tobacco: Never Used   Substance Use Topics     Alcohol use: Yes     Drug use: Yes     Types: Opiates       Family History   Fam hx reviewed, non contributory    Medications   I have reviewed this patient's current medications    Allergies   Allergies   Allergen Reactions     Amoxicillin Hives       Physical Exam   Vital Signs: Temp: 98.6  F (37  C) Temp src: Oral BP: (!) 137/92 Pulse: 126 Heart Rate: 94 Resp: 16 SpO2: 100 % O2 Device: None (Room air)    Weight: 131 lbs 3 oz  GENERAL: Alert and oriented x 3. NAD. Appears in mod withdrawal.  HEENT: MMM  CV: tachycardic, regular rhythm  RESPIRATORY: Effort normal on RA. Lungs CTAB.  GI: Abdomen soft and non distended with normoactive bowel sounds present in all quadrants. No tenderness, rebound, guarding.   NEUROLOGICAL: No focal deficits. Moves all extremities. Mild tremors in UE.  SKIN: No jaundice or rashes on exposed areas of skin.

## 2019-05-10 NOTE — PROGRESS NOTES
Nursing Admission Note: Voluntary admission from ED to 3A for Heroin and alcohol withdrawal.  No previous 3A admissions.  Reported snorting 2 grams daily of heroin for past 2 years. Last use was yesterday at 1:30 pm.  Drinks 1.5 liters of alcohol daily for past 1.5 years, last use was 2 days ago. COWS =11 and MSSA = 14 at time of admission.  Cooperative but mildly irritable and appears very uncomfortable.  Denied suicidal thoughts and expressed hope for the future.  Planning to go to treatment after detox.

## 2019-05-10 NOTE — H&P
Admitted:     05/09/2019      IDENTIFYING INFORMATION:  The patient is a 24-year-old  male.  He is single, has a significant other.  He has no children.  He is unemployed.      CHIEF COMPLAINT:  Heroin.      The patient came here wanting detox from heroin and alcohol.  Started using both of them at the age of 21.  He has tolerance to both of them, withdrawal, progressive use with loss of control, tried to quit unsuccessfully, used despite negative consequences, money, relationships.  He smokes half a pack a day.  Does not cohn.  Does not use any other street drugs.  His urine drug screen is positive for benzos, but he says that he just used it once.  He describes that he has social anxiety.  When he gets in social situations, he becomes sweaty, nauseous, heart rate increases, he becomes hot and red.  He does not have any depression, nii.  Does not have any suicidal action plan or intent.      PAST PSYCHIATRIC HISTORY:  Never psychiatrically hospitalized.  Never had any chemical dependency treatments.      REVIEW OF SYSTEMS:  A 10-point system reviewed and is negative.      VITAL SIGNS:  The patient's vitals are as follows:  Temperature of 98.6, respiratory rate of 16, BP of 137/90.      FAMILY HISTORY:  Sister uses meth and sister has anxiety.      SOCIAL HISTORY:  Parents  when he was 3.  He was bullied when he was young.  He has 12 years of education.      MENTAL STATUS EXAMINATION:  The patient is a 24-year-old  male who appears his stated age, adequate grooming, adequate hygiene.  He maintains good eye contact.  He is cooperative.  He has no psychomotor abnormalities.  No gait problems.  Mood is depressed.  Affect is congruent.  Speech is spontaneous, normal rate.  Does not have any suicidal or homicidal ideation.  Linear tp Does not have auditory or visual hallucinations. Alert oriented x3  Recent and remote memory, language, fund of knowledge are all adequate.  No loose  associations.  The patient does not have any active suicidal or homicidal ideation, plan or intent.      DIAGNOSES:   AXIS I:  Opiate use disorder, severe; alcohol use disorder, severe; nicotine use disorder, severe; social anxiety disorder.      PLAN:  The patient will be detoxed off opiates using Suboxone.  He wants to get Suboxone maintenance.  He will be detoxed off alcohol using MSSA protocol and Valium.  For his social anxiety disorder, he will be put on Lexapro. 10 mg po qd The patient will be seen by Case Management and Internal Medicine.  The patient wants to do treatment.  HE IS ALLERGIC TO AMOXICILLIN.         KHALIF KNIGHT MD             D: 05/10/2019   T: 05/10/2019   MT: JUANIS      Name:     WASHINGTON CHAVEZ   MRN:      [id number]        Account:      [id number]   :      1995        Admitted:     2019                   Document: [id number]

## 2019-05-10 NOTE — PROGRESS NOTES
Met with Pt to initiate discharge planning.  Pt is requesting referral to treatment.  Pt has MA and will need [place] [id number] funding.  Coached Pt to complete paperwork for assessment and referral.  Pt acknowledged.

## 2019-05-11 PROCEDURE — 25000132 ZZH RX MED GY IP 250 OP 250 PS 637: Performed by: PSYCHIATRY & NEUROLOGY

## 2019-05-11 PROCEDURE — H2032 ACTIVITY THERAPY, PER 15 MIN: HCPCS

## 2019-05-11 PROCEDURE — 25000132 ZZH RX MED GY IP 250 OP 250 PS 637: Performed by: NURSE PRACTITIONER

## 2019-05-11 PROCEDURE — 12800008 ZZH R&B CD ADULT

## 2019-05-11 PROCEDURE — 25000131 ZZH RX MED GY IP 250 OP 636 PS 637: Performed by: PSYCHIATRY & NEUROLOGY

## 2019-05-11 RX ORDER — NICOTINE 21 MG/24HR
1 PATCH, TRANSDERMAL 24 HOURS TRANSDERMAL DAILY
Status: DISCONTINUED | OUTPATIENT
Start: 2019-05-11 | End: 2019-05-14 | Stop reason: HOSPADM

## 2019-05-11 RX ADMIN — DIAZEPAM 10 MG: 5 TABLET ORAL at 08:22

## 2019-05-11 RX ADMIN — TRAZODONE HYDROCHLORIDE 50 MG: 50 TABLET ORAL at 22:05

## 2019-05-11 RX ADMIN — ONDANSETRON 4 MG: 4 TABLET, ORALLY DISINTEGRATING ORAL at 04:12

## 2019-05-11 RX ADMIN — BUPRENORPHINE HCL 2 MG: 2 TABLET SUBLINGUAL at 08:22

## 2019-05-11 RX ADMIN — ONDANSETRON 4 MG: 4 TABLET, ORALLY DISINTEGRATING ORAL at 11:29

## 2019-05-11 RX ADMIN — DIAZEPAM 10 MG: 5 TABLET ORAL at 16:10

## 2019-05-11 RX ADMIN — BUPRENORPHINE HCL 2 MG: 2 TABLET SUBLINGUAL at 12:34

## 2019-05-11 RX ADMIN — BUPRENORPHINE HCL 2 MG: 2 TABLET SUBLINGUAL at 16:10

## 2019-05-11 RX ADMIN — DIAZEPAM 10 MG: 5 TABLET ORAL at 12:34

## 2019-05-11 RX ADMIN — NICOTINE 1 PATCH: 14 PATCH, EXTENDED RELEASE TRANSDERMAL at 17:33

## 2019-05-11 RX ADMIN — TRAZODONE HYDROCHLORIDE 50 MG: 50 TABLET ORAL at 21:04

## 2019-05-11 RX ADMIN — NICOTINE POLACRILEX 4 MG: 2 GUM, CHEWING BUCCAL at 14:53

## 2019-05-11 RX ADMIN — DIAZEPAM 10 MG: 5 TABLET ORAL at 20:12

## 2019-05-11 RX ADMIN — DIAZEPAM 10 MG: 5 TABLET ORAL at 00:10

## 2019-05-11 RX ADMIN — TRAZODONE HYDROCHLORIDE 50 MG: 50 TABLET ORAL at 00:10

## 2019-05-11 RX ADMIN — BUPRENORPHINE HCL 2 MG: 2 TABLET SUBLINGUAL at 20:12

## 2019-05-11 ASSESSMENT — ACTIVITIES OF DAILY LIVING (ADL)
ORAL_HYGIENE: INDEPENDENT
DRESS: INDEPENDENT
HYGIENE/GROOMING: INDEPENDENT

## 2019-05-11 NOTE — PLAN OF CARE
Continues in detox status on alcohol and opiate withdrawal protocols.MSSA scores 12 and 11. Medicated x2 with Valium 10 mg. Cows scores 11 and 10. Denies suicide ideation and thoughts of self harm.

## 2019-05-12 PROCEDURE — 25000132 ZZH RX MED GY IP 250 OP 250 PS 637: Performed by: PSYCHIATRY & NEUROLOGY

## 2019-05-12 PROCEDURE — H2032 ACTIVITY THERAPY, PER 15 MIN: HCPCS

## 2019-05-12 PROCEDURE — 12800008 ZZH R&B CD ADULT

## 2019-05-12 RX ADMIN — DIAZEPAM 5 MG: 5 TABLET ORAL at 12:44

## 2019-05-12 RX ADMIN — NICOTINE 1 PATCH: 14 PATCH, EXTENDED RELEASE TRANSDERMAL at 08:26

## 2019-05-12 RX ADMIN — DIAZEPAM 5 MG: 5 TABLET ORAL at 20:20

## 2019-05-12 RX ADMIN — BUPRENORPHINE HCL 2 MG: 2 TABLET SUBLINGUAL at 08:27

## 2019-05-12 RX ADMIN — DIAZEPAM 5 MG: 5 TABLET ORAL at 16:37

## 2019-05-12 RX ADMIN — BUPRENORPHINE HCL 2 MG: 2 TABLET SUBLINGUAL at 16:37

## 2019-05-12 RX ADMIN — TRAZODONE HYDROCHLORIDE 50 MG: 50 TABLET ORAL at 21:10

## 2019-05-12 RX ADMIN — BUPRENORPHINE HCL 2 MG: 2 TABLET SUBLINGUAL at 20:20

## 2019-05-12 RX ADMIN — HYDROXYZINE HYDROCHLORIDE 25 MG: 25 TABLET ORAL at 22:03

## 2019-05-12 RX ADMIN — DIAZEPAM 5 MG: 5 TABLET ORAL at 08:27

## 2019-05-12 RX ADMIN — HYDROXYZINE HYDROCHLORIDE 25 MG: 25 TABLET ORAL at 02:41

## 2019-05-12 RX ADMIN — BUPRENORPHINE HCL 2 MG: 2 TABLET SUBLINGUAL at 12:44

## 2019-05-12 RX ADMIN — TRAZODONE HYDROCHLORIDE 50 MG: 50 TABLET ORAL at 22:03

## 2019-05-12 ASSESSMENT — ACTIVITIES OF DAILY LIVING (ADL)
DRESS: INDEPENDENT
ORAL_HYGIENE: INDEPENDENT
HYGIENE/GROOMING: INDEPENDENT

## 2019-05-12 NOTE — PROGRESS NOTES
Case Management Note  5/11/2019    Writer met with pt at approx 10:30 AM Saturday regarding Rule 25. Pt had his intake paperwork but reported he had been too ill to fill it out. Writer encouraged him to work on it that day if he is able and turn it in to the . Pt reported he would work on it when he was feeling better.     Jada Suresh LPC, LADC

## 2019-05-12 NOTE — PLAN OF CARE
Continues in detox status on alcohol and opiate withdrawal protocols. Patient states he was awake most of the night with vomiting and diarrhea. Did not report this to the nursing staff. MSSA 8 and Cows score 10 this AM. Denies suicide ideation and thoughts of self harm.

## 2019-05-12 NOTE — PROGRESS NOTES
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   Child(juliana) N/A   Other relative(s) Usually supportive   Friend(s)/neighbor(s) N/A   Child(juliana) s father(s)/mother(s) N/A   Support group member(s) N/A   Community of [place] members N/A   /counselor/therapist/healer N/A   Other (specify) N/A     8A. What is your current living situation?     Lives in basement of partner's parents house with partner.     8B. What is your long term plan for where you will be living?     Continue living there after treatment.     8C. Tell me about your living environment/neighborhood? Ask enough follow up questions to determine safety, criminal activity, availability of alcohol and drugs, supportive or antagonistic to the person making changes.      No concerns.     9. Criminal justice history: Gather current/recent history and any significant history related to substance use--Arrests? Convictions? Circumstances? Alcohol or drug involvement? Sentences? Still on probation or parole? Expectations of the court? Current court order? Any sex offenses - lifetime? What level? (DSM)    None.     10. What obstacles exist to participating in treatment? (Time off work, childcare, funding, transportation, pending group home time, living situation)     None    Dimension VI Ratings   Recovery environment - The placing authority must use the " criteria in Dimension VI to determine a client s recovery environment.   RISK DESCRIPTIONS - Severity rating: 3 Client is not engaged in structured, meaningful activity and the client's peers, family, significant other, and living environment are unsupportive, or there is significant criminal justice system involvement.    REASONS SEVERITY WAS ASSIGNED - (What support does the person have for making changes? What structure/stability does the person have in his or her daily life that will increase the likelihood that changes can be sustained? What problems exist in the person s environment that will jeopardize getting/staying clean and sober?)     Pt is unemployed, not attending school. Pt lives in a stable environment, but this has accommodated his use in the past. Pt and his partner are attempting to get sober together. Pt has friends and family who are supportive. Pt lacks engagement in positive daily structure, or engagement in sober support groups or sober social activities.        Client Choice/Exceptions   Would you like services specific to language, age, gender, culture, [facility] preference, race, ethnicity, sexual orientation or disability?  No    What particular treatment choices and options would you like to have? Residential treatment or IOP w/Lodging    Do you have a preference for a particular treatment program? [facility]    Criteria for Diagnosis     Criteria for Diagnosis  DSM-5 Criteria for Substance Use Disorder  Instructions: Determine whether the client currently meets the criteria for Substance Use Disorder using the diagnostic criteria in the DSM-V pp.[id number]. Current means during the most recent 12 months outside a facility that controls access to substances    Category of Substance Severity (ICD-10 Code / DSM 5 Code)     Alcohol Use Disorder Severe  (10.20) (303.90)   Cannabis Use Disorder NA   Hallucinogen Use Disorder NA   Inhalant Use Disorder NA   Opioid Use Disorder Severe   (F11.20)  "(304.00)   Sedative, Hypnotic, or Anxiolytic Use Disorder NA   Stimulant Related Disorder NA   Tobacco Use Disorder NA   Other (or unknown) Substance Use Disorder NA     Collateral Contact Summary     Number of contacts made: 2    Contact with referring person:  N/A.    If court related records were reviewed, summarize here: N/A    Information from collateral contacts supported/largely agreed with information from the client and associated risk ratings.    Rule 25 Assessment Summary and Plan   's Recommendation    1)  Complete a residential based or similar treatment program such as [facility].   2)  Abstain from all mood-altering chemicals unless prescribed by a licensed provider.   3)  Attend weekly sober support group meetings.     4)  Actively work with a sponsor or .  5)  Follow all the recommendations of your treatment/medical providers.  6)  Patient may benefit from obtaining a full mental health evaluation.        Collateral Contacts     Name:    Dr. Misael MD   Relationship:    3A Physician   Phone Number:    [phone] Releases:    Yes     \"The patient came here wanting detox from heroin and alcohol.  Started using both of them at the age of 21.  He has tolerance to both of them, withdrawal, progressive use with loss of control, tried to quit unsuccessfully, used despite negative consequences, money, relationships.  He smokes half a pack a day.  Does not cohn.  Does not use any other street drugs.  His urine drug screen is positive for benzos, but he says that he just used it once.  He describes that he has social anxiety.  When he gets in social situations, he becomes sweaty, nauseous, heart rate increases, he becomes hot and red.  He does not have any depression, nii.  Does not have any suicidal action plan or intent.\"    Collateral Contacts     Name    Dr. Katrin MD Relationship    ER Physician Phone Number    [phone] Releases     Yes       \"The patient came " "here wanting detox from heroin and alcohol.  Started using both of them at the age of 21.  He has tolerance to both of them, withdrawal, progressive use with loss of control, tried to quit unsuccessfully, used despite negative consequences, money, relationships.  He smokes half a pack a day.  Does not cohn.  Does not use any other street drugs.  His urine drug screen is positive for benzos, but he says that he just used it once.  He describes that he has social anxiety.  When he gets in social situations, he becomes sweaty, nauseous, heart rate increases, he becomes hot and red.  He does not have any depression, nii.  Does not have any suicidal action plan or intent.\"  llateral Contacts      A problematic pattern of alcohol/drug use leading to clinically significant impairment or distress, as manifested by at least two of the following, occurring within a 12-month period:    Alcohol/drug is often taken in larger amounts or over a longer period than was intended.  A great deal of time is spent in activities necessary to obtain alcohol, use alcohol, or recover from its effects.  Craving, or a strong desire or urge to use alcohol/drug  Recurrent alcohol/drug use resulting in a failure to fulfill major role obligations at work, school or home.  Continued alcohol use despite having persistent or recurrent social or interpersonal problems caused or exacerbated by the effects of alcohol/drug.  Important social, occupational, or recreational activities are given up or reduced because of alcohol/drug use.  Recurrent alcohol/drug use in situations in which it is physically hazardous.  Tolerance, as defined by either of the following: A need for markedly increased amounts of alcohol/drug to achieve intoxication or desired effect. and A markedly diminished effect with continued use of the same amount of alcohol/drug.  Withdrawal, as manifested by either of the following: The characteristic withdrawal syndrome for " alcohol/drug (refer to Criteria A and B of the criteria set for alcohol/drug withdrawal).      Specify if: In early remission:  After full criteria for alcohol/drug use disorder were previously met, none of the criteria for alcohol/drug use disorder have been met for at least 3 months but for less than 12 months (with the exception that Criterion A4,  Craving or a strong desire or urge to use alcohol/drug  may be met).     In sustained remission:   After full criteria for alcohol use disorder were previously met, non of the criteria for alcohol/drug use disorder have been met at any time during a period of 12 months or longer (with the exception that Criterion A4,  Craving or strong desire or urge to use alcohol/drug  may be met).   Specify if:   This additional specifier is used if the individual is in an environment where access to alcohol is restricted.    Mild: Presence of 2-3 symptoms    Moderate: Presence of 4-5 symptoms    Severe: Presence of 6 or more symptoms

## 2019-05-13 ENCOUNTER — TELEPHONE (OUTPATIENT)
Dept: BEHAVIORAL HEALTH | Facility: CLINIC | Age: 24
End: 2019-05-13

## 2019-05-13 PROCEDURE — 25000132 ZZH RX MED GY IP 250 OP 250 PS 637: Performed by: PSYCHIATRY & NEUROLOGY

## 2019-05-13 PROCEDURE — 99232 SBSQ HOSP IP/OBS MODERATE 35: CPT | Performed by: PSYCHIATRY & NEUROLOGY

## 2019-05-13 PROCEDURE — H0001 ALCOHOL AND/OR DRUG ASSESS: HCPCS

## 2019-05-13 PROCEDURE — 12800008 ZZH R&B CD ADULT

## 2019-05-13 RX ORDER — QUETIAPINE FUMARATE 25 MG/1
25-50 TABLET, FILM COATED ORAL
Status: DISCONTINUED | OUTPATIENT
Start: 2019-05-13 | End: 2019-05-14 | Stop reason: HOSPADM

## 2019-05-13 RX ORDER — BUPRENORPHINE 2 MG/1
2 TABLET SUBLINGUAL ONCE
Status: COMPLETED | OUTPATIENT
Start: 2019-05-13 | End: 2019-05-13

## 2019-05-13 RX ORDER — BUPRENORPHINE AND NALOXONE 4; 1 MG/1; MG/1
1 FILM, SOLUBLE BUCCAL; SUBLINGUAL 2 TIMES DAILY
Status: COMPLETED | OUTPATIENT
Start: 2019-05-13 | End: 2019-05-13

## 2019-05-13 RX ORDER — BUPRENORPHINE AND NALOXONE 4; 1 MG/1; MG/1
1 FILM, SOLUBLE BUCCAL; SUBLINGUAL 3 TIMES DAILY
Status: DISCONTINUED | OUTPATIENT
Start: 2019-05-14 | End: 2019-05-14

## 2019-05-13 RX ADMIN — BUPRENORPHINE HYDROCHLORIDE, NALOXONE HYDROCHLORIDE 1 FILM: 4; 1 FILM, SOLUBLE BUCCAL; SUBLINGUAL at 14:04

## 2019-05-13 RX ADMIN — BUPRENORPHINE HYDROCHLORIDE, NALOXONE HYDROCHLORIDE 1 FILM: 4; 1 FILM, SOLUBLE BUCCAL; SUBLINGUAL at 20:12

## 2019-05-13 RX ADMIN — FOLIC ACID 1 MG: 1 TABLET ORAL at 08:35

## 2019-05-13 RX ADMIN — LOPERAMIDE HYDROCHLORIDE 2 MG: 2 CAPSULE ORAL at 10:18

## 2019-05-13 RX ADMIN — NICOTINE 1 PATCH: 14 PATCH, EXTENDED RELEASE TRANSDERMAL at 08:35

## 2019-05-13 RX ADMIN — ESCITALOPRAM OXALATE 10 MG: 10 TABLET ORAL at 08:35

## 2019-05-13 RX ADMIN — MULTIPLE VITAMINS W/ MINERALS TAB 1 TABLET: TAB at 08:35

## 2019-05-13 RX ADMIN — BUPRENORPHINE HCL 2 MG: 2 TABLET SUBLINGUAL at 10:18

## 2019-05-13 RX ADMIN — QUETIAPINE FUMARATE 50 MG: 25 TABLET ORAL at 22:04

## 2019-05-13 RX ADMIN — THIAMINE HCL TAB 100 MG 100 MG: 100 TAB at 08:35

## 2019-05-13 RX ADMIN — LOPERAMIDE HYDROCHLORIDE 2 MG: 2 CAPSULE ORAL at 22:15

## 2019-05-13 RX ADMIN — BUPRENORPHINE HCL 2 MG: 2 TABLET SUBLINGUAL at 08:35

## 2019-05-13 ASSESSMENT — ACTIVITIES OF DAILY LIVING (ADL)
DRESS: STREET CLOTHES
HYGIENE/GROOMING: INDEPENDENT
LAUNDRY: WITH SUPERVISION
ORAL_HYGIENE: INDEPENDENT

## 2019-05-13 NOTE — PLAN OF CARE
Behavioral Team Discussion: (5/13/2019)    Continued Stay Criteria/Rationale: Patient admitted for Chemical Use Issues.  Plan: The following services will be provided to the patient; psychiatric assessment, medication management, therapeutic milieu, individual and group support, and skills groups.   Participants: 3A Provider: Dr. Aidee Douglas MD; 3A RN's: Lalo Echols, RN, Abebe Duarte, RN, Sally Selisker, RN and Isma Ortega, RN; 3A CM's: Emmy De Leon , Hema Mercer and Ruth Bailey.  Summary/Recommendation: Providers will assess today for treatment recommendations, discharge planning, and aftercare plans. CM will meet with pt for discharge planning.   Medical/Physical: Deferred (see medical notes).  Precautions:   Behavioral Orders   Procedures     Code 1 - Restrict to Unit     Routine Programming     As clinically indicated     Status 15     Every 15 minutes.     Withdrawal precautions     Rationale for change in precautions or plan: N/A  Progress: Improving.
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
## 2019-05-13 NOTE — PROGRESS NOTES
Case Management Note  5/13/2019    Writer met with pt to sign application documents requesting Rule 25 funding for [facility]. Completed application faxed to [facility] Clinical Review Team.    Writer spoke with Allie Rod, [facility] Clinical Review Team. Pt has been approved for 28 days in [facility]. Writer met with pt to complete addendum and SBAR for [facility]. SBAR completed and routed to [facility] counseling staff. Pt has been scheduled to admit to the [facility] in [facility] on Tuesday, 5/14/19. Pt notified. Case management complete.    Hema Mercer MA, [facility]
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
## 2019-05-13 NOTE — PROGRESS NOTES
05/12/19 2000   Therapeutic Recreation   Type of Intervention structured groups   Activity leisure education   Response Participates, initiates socially appropriate   Hours 1     Pt participated in Therapeutic Recreation group with focus on stress reduction, leisure education, and acquisition of knowledge and skills. Pt was fully engaged and cooperative in group recreational intervention; leisure inventory. Pt participated through the entire duration of the group. Pt discussed many healthy interests that are enjoyed during free time. Showed progress in session goals. Pt mood was calm and was appropriate with interactions.

## 2019-05-14 ENCOUNTER — HOSPITAL ENCOUNTER (OUTPATIENT)
Dept: BEHAVIORAL HEALTH | Facility: CLINIC | Age: 24
End: 2019-05-14
Attending: FAMILY MEDICINE
Payer: MEDICAID

## 2019-05-14 VITALS
DIASTOLIC BLOOD PRESSURE: 83 MMHG | TEMPERATURE: 98.8 F | HEART RATE: 117 BPM | WEIGHT: 131.19 LBS | SYSTOLIC BLOOD PRESSURE: 129 MMHG | OXYGEN SATURATION: 100 % | RESPIRATION RATE: 16 BRPM

## 2019-05-14 PROBLEM — F19.20 CHEMICAL DEPENDENCY (H): Status: ACTIVE | Noted: 2019-05-14

## 2019-05-14 PROCEDURE — 10020000 ZZH LODGING PLUS FACILITY CHARGE ADULT

## 2019-05-14 PROCEDURE — 25000132 ZZH RX MED GY IP 250 OP 250 PS 637: Performed by: PSYCHIATRY & NEUROLOGY

## 2019-05-14 PROCEDURE — 99239 HOSP IP/OBS DSCHRG MGMT >30: CPT | Performed by: PSYCHIATRY & NEUROLOGY

## 2019-05-14 RX ORDER — ESCITALOPRAM OXALATE 10 MG/1
10 TABLET ORAL DAILY
Qty: 30 TABLET | Refills: 0 | Status: SHIPPED | OUTPATIENT
Start: 2019-05-15 | End: 2019-06-11

## 2019-05-14 RX ORDER — LANOLIN ALCOHOL/MO/W.PET/CERES
3 CREAM (GRAM) TOPICAL
COMMUNITY
End: 2019-06-08

## 2019-05-14 RX ORDER — QUETIAPINE FUMARATE 25 MG/1
25-50 TABLET, FILM COATED ORAL
Qty: 60 TABLET | Refills: 0 | Status: SHIPPED | OUTPATIENT
Start: 2019-05-14 | End: 2019-06-11

## 2019-05-14 RX ORDER — LORATADINE 10 MG/1
10 TABLET ORAL DAILY PRN
COMMUNITY
End: 2019-06-08

## 2019-05-14 RX ORDER — BUPRENORPHINE AND NALOXONE 4; 1 MG/1; MG/1
2 FILM, SOLUBLE BUCCAL; SUBLINGUAL DAILY
Status: DISCONTINUED | OUTPATIENT
Start: 2019-05-15 | End: 2019-05-14 | Stop reason: HOSPADM

## 2019-05-14 RX ORDER — BUPRENORPHINE AND NALOXONE 12; 3 MG/1; MG/1
1 FILM, SOLUBLE BUCCAL; SUBLINGUAL DAILY
Qty: 7 FILM | Refills: 0 | Status: SHIPPED | OUTPATIENT
Start: 2019-05-15 | End: 2019-05-21 | Stop reason: DRUGHIGH

## 2019-05-14 RX ORDER — AMOXICILLIN 250 MG
2 CAPSULE ORAL DAILY PRN
COMMUNITY
End: 2019-06-08

## 2019-05-14 RX ORDER — MULTIPLE VITAMINS W/ MINERALS TAB 9MG-400MCG
1 TAB ORAL DAILY
Qty: 30 TABLET | Refills: 0 | Status: SHIPPED | OUTPATIENT
Start: 2019-05-15 | End: 2019-10-17

## 2019-05-14 RX ORDER — BUPRENORPHINE AND NALOXONE 8; 2 MG/1; MG/1
1 FILM, SOLUBLE BUCCAL; SUBLINGUAL ONCE
Status: COMPLETED | OUTPATIENT
Start: 2019-05-14 | End: 2019-05-14

## 2019-05-14 RX ORDER — ACETAMINOPHEN 325 MG/1
325-650 TABLET ORAL EVERY 4 HOURS PRN
COMMUNITY
End: 2019-06-08

## 2019-05-14 RX ORDER — BUPRENORPHINE AND NALOXONE 8; 2 MG/1; MG/1
1 FILM, SOLUBLE BUCCAL; SUBLINGUAL DAILY
Qty: 7 FILM | Refills: 0 | Status: SHIPPED | OUTPATIENT
Start: 2019-05-14 | End: 2019-05-14

## 2019-05-14 RX ORDER — MAGNESIUM HYDROXIDE/ALUMINUM HYDROXICE/SIMETHICONE 120; 1200; 1200 MG/30ML; MG/30ML; MG/30ML
30 SUSPENSION ORAL EVERY 6 HOURS PRN
COMMUNITY
End: 2019-06-08

## 2019-05-14 RX ORDER — IBUPROFEN 200 MG
200-400 TABLET ORAL EVERY 6 HOURS PRN
COMMUNITY
End: 2019-06-08

## 2019-05-14 RX ADMIN — THIAMINE HCL TAB 100 MG 100 MG: 100 TAB at 07:48

## 2019-05-14 RX ADMIN — MULTIPLE VITAMINS W/ MINERALS TAB 1 TABLET: TAB at 07:47

## 2019-05-14 RX ADMIN — NICOTINE 1 PATCH: 14 PATCH, EXTENDED RELEASE TRANSDERMAL at 07:48

## 2019-05-14 RX ADMIN — BUPRENORPHINE AND NALOXONE 1 FILM: 8; 2 FILM, SOLUBLE BUCCAL; SUBLINGUAL at 11:31

## 2019-05-14 RX ADMIN — FOLIC ACID 1 MG: 1 TABLET ORAL at 07:48

## 2019-05-14 RX ADMIN — BUPRENORPHINE HYDROCHLORIDE, NALOXONE HYDROCHLORIDE 1 FILM: 4; 1 FILM, SOLUBLE BUCCAL; SUBLINGUAL at 07:48

## 2019-05-14 RX ADMIN — ESCITALOPRAM OXALATE 10 MG: 10 TABLET ORAL at 07:47

## 2019-05-14 ASSESSMENT — ANXIETY QUESTIONNAIRES
1. FEELING NERVOUS, ANXIOUS, OR ON EDGE: NEARLY EVERY DAY
4. TROUBLE RELAXING: NEARLY EVERY DAY
7. FEELING AFRAID AS IF SOMETHING AWFUL MIGHT HAPPEN: NOT AT ALL
5. BEING SO RESTLESS THAT IT IS HARD TO SIT STILL: MORE THAN HALF THE DAYS
3. WORRYING TOO MUCH ABOUT DIFFERENT THINGS: SEVERAL DAYS
2. NOT BEING ABLE TO STOP OR CONTROL WORRYING: MORE THAN HALF THE DAYS
GAD7 TOTAL SCORE: 12
6. BECOMING EASILY ANNOYED OR IRRITABLE: SEVERAL DAYS

## 2019-05-14 ASSESSMENT — PATIENT HEALTH QUESTIONNAIRE - PHQ9: SUM OF ALL RESPONSES TO PHQ QUESTIONS 1-9: 14

## 2019-05-14 NOTE — DISCHARGE SUMMARY
Admit Date:     05/09/2019   Discharge Date:           More than 35 minutes spent on this summary, doing the discharge instructions, discharge medications, discharge mental status examination.      DISCHARGE DIAGNOSES:   Axis I:  Opiate use disorder, severe.  Social phobia.  Social anxiety disorder.  Alcohol use disorder, severe.      Please see detailed admission by Dr. Douglas on 05/10/2019.      HOSPITAL COURSE:  During the hospitalization, the patient had an uneventful upward titration of Suboxone.  He was detoxed off alcohol using MSSA protocol and Valium.  He was put on Lexapro for his social anxiety disorder.  He started on Seroquel for his sleep.  During his hospitalization, he was seen by Darren Perera.  Please see detailed note on 05/10/2019 for Internal Medicine.  The patient had lab work done, which showed a normal comprehensive metabolic panel.  WBC is 11.3, hemoglobin is 12.8, hematocrit is 37.6.  During the hospitalization, the patient's energy, motivation, sleep and interest improved.  He did not have any suicidal or homicidal ideation, plan or intent.  He met with the  and his plan is to do treatment at [facility].      DISCHARGE DISPOSITION:  The patient going to [facility].      DISCHARGE FOLLOWUP:  On 05/21/2019, with Dr. Bonilla.      DISCHARGE MENTAL STATUS EXAMINATION:  The patient is alert, oriented x 3.  Recent and remote memory, language, fund of knowledge are all adequate.  No loose associations.  The patient does not have any active suicidal or homicidal ideation, plan or intent.  Stable to be discharged.         KHALIF DOUGLAS MD          DISCHARGE MENTAL STATUS EXAMINATION:  The patient is alert, oriented x3.  Good fund of knowledge.  Good use of language.  Recent and remote memory, language, fund of knowledge are all adequate.  Euthymic mood congruent affect  Speech normal rate/rhythm linear tp no loose asso,The patient does not have any active suicidal or  homicidal ideation.  Does not have any auditory or visual hallucination.  Fair insight/judgment At this time, the patient was stable to be discharged.        Pt was not determined to not be a danger to himself or others. At the current time of discharge, the patient does not meet criteria for involuntary hospitalization. On the day of discharge, the patient reports that they do not have suicidal or homicidal ideation and would never hurt themselves or others. Steps taken to minimize risk include: assessing patient s behavior and thought process daily during hospital stay, discharging patient with adequate plan for follow up for mental and physical health and discussing safety plan of returning to the hospital should the patient ever have thoughts of harming themselves or others. Therefore, based on all available evidence including the factors cited above, the patient does not appear to be at imminent risk for self-harm, and is appropriate for outpatient level of care.     Educated about side effects/risk vs benefits /alternative including non treatment.Pt consented to be on medication.     .Total time spent on discharge summary more than 35 min  More than  20 min  planning, coordination of care, medication reconciliation and performance of physical exam on day of discharge.Care was coordinated with unit RN and unit therapist       Jaime Mccormick   Home Medication Instructions YOKASTA:[id number]    Printed on:05/14/19 1200   Medication Information                      Buprenorphine HCl-Naloxone HCl (SUBOXONE) 12-3 MG FILM per film  Place 1 Film under the tongue daily             escitalopram (LEXAPRO) 10 MG tablet  Take 1 tablet (10 mg) by mouth daily             multivitamin w/minerals (THERA-VIT-M) tablet  Take 1 tablet by mouth daily             QUEtiapine (SEROQUEL) 25 MG tablet  Take 1-2 tablets (25-50 mg) by mouth nightly as needed (psychotic agitation)             Disposition: [facility]     Follow-up Appointment:   Appointment Date/Time: 19 @ 3:00 pm    Psychiatrist/Primary Care Giver: Dr. Bonilla    Address: [facility], [address] [place] [address], [address], [place] [address]    Phone Number: [phone]       Primary Care Physician appointment date/time: Friday, 19 @ 1:40 pm. Primary Care Giver: Dr. Marco MD. Address: [address] [place] [address]. Second floor.           D: 2019   T: 2019   MT: BRENT      Name:     WASHINGTON CHAVEZ   MRN:      [id number]        Account:        [id number]   :      1995           Admit Date:     2019                                  Discharge Date:       Document: [id number]

## 2019-05-14 NOTE — TELEPHONE ENCOUNTER
----- Message from Sonido Eden sent at 5/14/2019 11:25 AM CDT -----  Regarding: Need YOKASTA  Hello,  Please create hospital account for patient. He will be transferring to  today at 2:00.    Thanks,  Sonido Denis

## 2019-05-14 NOTE — PROGRESS NOTES
Initial Services Plan        Service Initiation Date: 5/14/2019    Immediate health and/or safety concerns: No    Identify health and safety concern(s) below and include plan to address:    None Identified    Client issues to be addressed in the first treatment sessions:     Fear of adjusting to roommate or different environment  Fear of being in a group and/or speaking in front of people  He would like work on developing sober coping skills and long-term sober maintenance skills.  He is open to considering entering some type of sober housing upon completing treatment and his housing is up in the air at this time.    Treatment suggestions for client during the time between intake (admit date) and completion of the individual treatment plan:     Look for a sober support network, i.e. 12 step, [facility], [facility], etc  Tour the treatment center or outpatient clinic  Introduce yourself to your treatment group. Spend time getting to know your peers  Review your patient or client handbook  Begin working on your treatment goal list    Completed by: ZAK Manning  Date completed: 5/14/2019 at 2:12 PM

## 2019-05-14 NOTE — TELEPHONE ENCOUNTER
LP SCREEN TELEPHONE NOTE   Jaime Mccormick paperwork was reviewed by ZAK Manning and the patient was deemed ELIGIBLE for the LP program.     Medical: Deferred, because this patient is currently on 3A IP detoxification unit.     Insurance: [employer], [employer] or [employer] - [employer] prior authorization is needed.  Allie from [facility] has verbally authorized 28 days of the LP program.     This will be a: 3A DIRECT TRANSFER, F140 will complete the VA, ISP and the LP UPDATE.     IV use or Pregnant: This patient is not pregnant or an IV drug user.     Business office: The patient has Consolidated Funding and would NOT need to consult with anyone in the business office about the out of pocket costs of the LP program.     List: This patient CAN be placed on the PRIORITY LP Waiting List at this time.       Group: Mental Health Enhanced Mixed Group     Additional Info as needed: NA     The best current contact telephone number for the patient is: 3A @ [phone], or @ [phone] if discharged home       Thanks,  ZAK Manning   5/14/2019

## 2019-05-14 NOTE — PROGRESS NOTES
[facility] Nursing Health Assessment      Vital signs:     There were no vitals taken for this visit.      Transfer from 3A detox    Counselor: Judy  Drug of Choice: Heroin and alcohol  Last use: 5/8/2019  Home clinic/MD: [facility] in [place], [place] (Pt has set up appt post discharge)  Appt with Dr. Didi Bonilla, Addiction Medicine on 5/21/2019 at 3 pm    Psychiatrist/therapist: Will try to get pt into see Dr. Pandey at [facility], 2nd floor , [facility]    Medical history/current conditions:  none    H&P Screen:  H&P within the last 90 days: Yes.  Date: 5/15/2019 Location: 3A/detox      Mental Health diagnosis: anxiety disorder  Medication compliant?: yes when not using  Recent sucidal thoughts? no     When? na  Current thought of self-harm? no    Plan? na    Pain assessment:   Pt. Experiencing pain at this time?  Yes.  Rating on 0-10 scale: (1-10 scale): 7.  Location: lower back and legs  opiate withdrawal  Result of: opiate withdrawal.       Nursing Assessment Summary:  Will contact [facility] clinic to set up appt for wrap service    On-going nursing intervention required?   No    Acute care visit recommended: no

## 2019-05-14 NOTE — DISCHARGE INSTRUCTIONS
Behavioral Discharge Planning and Instructions  THANK YOU FOR CHOOSING THE [address]  [phone]    Summary: You were admitted to Station 3A on 5/13/2019 for detoxification from opioids and alcohol.  A medical exam was performed that included lab work. You have met with a  and been approved for admission to [facility].  Please take care and make your recovery a daily priority, Jaime! It was a pleasure working with you and the entire treatment team here wishes you the very best in your recovery!     Recommendation:  Residential Treatment, psychotherapy, sober support group engagement and active work with a sponsor or  through [facility].    Main Diagnoses:  Per Dr. Douglas;  Opiate use disorder, severe;   Alcohol use disorder, severe;   Nicotine use disorder, severe;   Social anxiety disorder.     Major Treatments, Procedures and Findings:  You were treated for alcohol detoxification using valium administered based on the [facility] protocol and for opioid detoxification using subutex (buprenorphine). As an outpatient you will be prescribed suboxone, please take this medication as prescribed until it is gone. You completed a chemical dependency assessment. You had labs drawn and those results were reviewed with you. Please take a copy of your lab work with you to your next primary care physician appointment.    Symptoms to Report:  If you experience more anxiety, confusion, sleeplessness, deep sadness or thoughts of suicide, notify your treatment team or notify your primary care physician. IF ANY OF THE SYMPTOMS YOU ARE EXPERIENCING ARE A MEDICAL EMERGENCY CALL 911 IMMEDIATELY.     Opioid Safety Notice:  Overdoses and death from opioid use is a current health epidemic. Be aware that your tolerance level to any form of opioids has decreased since your hospitalization. Relapsing on opioids or combining them with other drugs or alcohol is  life-threatening.    Be aware that returning to your drug-using environment or being around people using drugs puts you at high risk for relapse. Healthier support is available daily, at no cost, at your local AA or NA meetings.    Lifestyle Adjustment: Adjust your lifestyle to get enough sleep, relaxation, exercise and good nutrition. Continue to develop healthy coping skills to decrease stress and promote a sober living environment. Do not use mood altering substances including alcohol, illegal drugs or addictive medications other than what is currently prescribed.     Disposition: Pt discharged to [facility]    Follow-up Appointment:   Appointment Date/Time: 5/21/19 @ 3:00 pm    Psychiatrist/Primary Care Giver: Dr. Bonilla    Address: [address], [address], [place] [address]    Phone Number: [phone]      Primary Care Physician appointment date/time: Friday, 6/17/19 @ 1:40 pm. Primary Care Giver: Dr. Marco MD. Address: [address] [place] [address]. Second floor.    Resources:   AA/NA and Sponsors are excellent resources for support and you can find one at any support group meeting.   http://www.aastpaul.org (then click meetings) This for the [facility] AA meetings  http://a[place].org/meetings/   This is for the [facility] AA meetings  http://www.nam[place].us (then click find a  Meeting) This is for [facility] NA   SMART Recovery - self management for addiction recovery:  www.smartrecovery.org  Pathways ~ A Health Crisis Resource & Support Center:  909.596.8500.  https://prescribetoprevent.org/patient-education/videos/  http://www.harmreduction.org  [facility] 672-947-7203  Support Group:  AA/NA and Sponsor/support.  National [place] on Mental Illness (www.mn.ronny.org): 203.320.3092 or 468-541-4682.  Alcoholics Anonymous (www.alcoholics-anonymous.org): Check your phone book for  your local chapter.  Suicide Awareness Voices of Education (SAVE) (www.save.org): 484-611-WEVP (7283)  National Suicide Prevention Line (www.mentalhealthmn.org): 053-419-EFQV (4480)  Mental Health Consumer/Survivor Network of [place] (www.mhcsn.net): 409.576.8510 or 928-657-5643  Mental Health Association of [place] (www.mentalhealth.org): 991.957.5149 or 380-557-5041   Substance Abuse and Mental Health Services (www.samhsa.gov)    [place] Recovery Connection ([facility])  [facility] connects people seeking recovery to resources that help foster and sustain long-term recovery.  Whether you are seeking resources for treatment, transportation, housing, job training, education, health care or other pathways to recovery, [facility] is a great place to start.  455.591.4862.  www.[facility].org    General Medication Instructions:   See your medication sheet(s) for instructions.   Take all medications as prescribed.  Make no changes unless your doctor suggests them.   Go to all your doctor visits.  Be sure to have all your required lab tests. This way, your medicines can be refilled on time.  Do not use any forms of alcohol.    Please Note:  If you have any questions at anytime after you are discharged please call the [facility] detox unit 3AW unit at [phone].  [facility] Behavioral Intake [phone]  Medical Records call [phone]  Outpatient Behavioral Intake call [phone]  LP+ Wait List/Bed Availability call [phone]    Please take this discharge folder with you to all your follow up appointments, it contains your lab results, diagnosis, medication list and discharge recommendations.      THANK YOU FOR CHOOSING THE [facility]

## 2019-05-14 NOTE — PROGRESS NOTES
Name: Jaime Mccormick  Date: 5/14/2019  Medical Record: [id number]    Envelope Number: [id number]    List of Contents (List each item separately in new row):   1 cell phone   1      Admission:  I am responsible for any personal items that are not sent to the safe or pharmacy.  [facility] is not responsible for loss, theft or damage of any property in my possession.      Patient Signature:  ___________________________________________       Date/Time:__________________________    Staff Signature: __________________________________       Date/Time:__________________________    2nd Staff person, if patient is unable/unwilling to sign:      __________________________________________________________       Date/Time: __________________________      Discharge:  [facility] has returned all of my personal belongings:    Patient Signature: ________________________________________     Date/Time: ____________________________________    Staff Signature: ______________________________________     Date/Time:_____________________________________

## 2019-05-14 NOTE — TELEPHONE ENCOUNTER
This patient was admitted to the [facility] program on 5/14/2019.  Please see the SBAR in the telephone note below completed by the 3A discharge planner for details on this patient.

## 2019-05-14 NOTE — PROGRESS NOTES
Pt given copy of their discharge instructions and medication administration instructions. All discharge plans and labs were discussed with patient. Pt reports no questions at this time regarding discharge plans, labs or medications. Pt denies any suicidal ideation, plans or intent at this time. Patient discharge assisted via [employer] directly to [facility] includes the [facility]. Patient plan is to begin treatment today at [facility]. Patient is discharged at this time.
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
## 2019-05-14 NOTE — PROGRESS NOTES
Progress Note    This patient had a comprehensive/Rule 25 assessment on 5/12/2019 completed by Jada Suresh LPC, [facility].  This patient was seen for a face to face update of the comprehensive/Rule 25 assessment on 5/14/2019 by ZAK Manning:  Yes    Alcohol/Drug use since the last CD evaluation (include date of last use):     No additional substances use since the last CD evaluation     Please note any other clinical changes since the last CD evaluation (such as medication changes, additional legal charges, detoxification admissions, overdoses, etc.)     No significant changes since the last CD evaluation       ASAM Dimensions Original scores Current Scores   I.) Intoxication and Withdrawal: 1 0   II.) Biomedical:  0 0   III.) Emotional and Behavioral:  2 2   IV.) Readiness to Change:  1 1   V.) Relapse Potential: 4 4   VI.) Recovery Environmental: 3 3     Please list clinical justifications for the above ASAM score changes since the original comprehensive assessment:     The patient's score on Dimension I changed from a 1 to a 0.  The patient's withdrawal symptoms had been addressed by his physicians while he had been on 3A.       Current ARMANDO: Current UA:     No ARMANDO as he was a direct transfer from 3A IP detoxification unit.      No UA as he was a direct transfer from 3A IP detoxification unit.        PHQ-9, JED-7   PHQ-9 on 5/14/2019 JED-7 on 5/14/2019   The patient's PHQ-9 score was 14 out of 27, indicating moderate depression.   The patient's JED-7 score was 12 out of 21, indicating moderate anxiety.       Corinne-Suicide Severity Rating Scale Reassessment   Have you ever wished you were dead or that you could go to sleep and not wake up?  Past Month:  No     Have you actually had any thoughts of killing yourself?  Past Month:  No     Have you been thinking about how you might do this?     Past Month:  No   Lifetime:  No   Have you had these thoughts and had some intention of acting on them?    "  Past Month:  No   Lifetime:  No   Have you started to work out the details of how to kill yourself?   Past Month:  No   Lifetime:  No   Do you intend to carry out this plan?   No     When you have the thoughts how long do they last?  The patient denied ever having any suicidal thoughts in life.     Are there things - anyone or anything (i.e. family, Mormonism, pain of death) that stopped you from wanting to die or acting on thoughts of suicide?  Does not apply       2008  The [facility]  Used with permission by Margaret Armas, PhD.       Guide to C-SSRS Risk Ratings   NO IDEATION:  with no active thoughts IDEATION: with a wish to die. IDEATION: with active thoughts. Risk Ratings   If Yes No No 0 - Very Low Risk   If NA Yes No 1 - Low Risk   If NA Yes Yes 2 - Low/moderate risk   IDEATION: associated thoughts of methods without intent or plan INTENT: Intent to follow through on suicide PLAN: Plan to follow through on suicide Risk Ratings cont...   If Yes No No 3 - Moderate Risk   If Yes Yes No 4 - High Risk   If Yes Yes Yes 5 - High Risk   The patient's ADDITIONAL RISK FACTORS and lack of PROTECTIVE FACTORS may increase their overall suicide risk ratings.     Additional Risk Factors:    No additional risk factors   Protective Factors:    Having people in his/her life that would prevent the patient from considering a suicide attempt (i.e. young children, spouse, parents, etc.)     Risk Status   0. - Very Low Risk:  Evaluation Counselors:  Document in Epic / SBAR to counselor \"Very Low Risk\".      Treatment Counselors:  Reassess upon admission as applicable, assess weekly in progress notes under Dimension 3 and summarize in Discharge / Treatment summary under Dimension 3.     Additional information to support suicide risk rating: There was no additional information to provide at this time.     "

## 2019-05-15 ENCOUNTER — HOSPITAL ENCOUNTER (OUTPATIENT)
Dept: BEHAVIORAL HEALTH | Facility: CLINIC | Age: 24
End: 2019-05-15
Attending: FAMILY MEDICINE
Payer: MEDICAID

## 2019-05-15 PROCEDURE — H2035 A/D TX PROGRAM, PER HOUR: HCPCS | Mod: HQ

## 2019-05-15 PROCEDURE — 10020000 ZZH LODGING PLUS FACILITY CHARGE ADULT

## 2019-05-15 ASSESSMENT — ANXIETY QUESTIONNAIRES: GAD7 TOTAL SCORE: 12

## 2019-05-15 NOTE — PROGRESS NOTES
Writer informed Dr. Sarabia that LP RN and pt made appt on 5/21 at 4 pm, with Dr. Pandey, [facility] 2nd [facility], [facility]..  Dr. Pandey will also cover Suboxone and see pt after he discharges from

## 2019-05-16 ENCOUNTER — HOSPITAL ENCOUNTER (OUTPATIENT)
Dept: BEHAVIORAL HEALTH | Facility: CLINIC | Age: 24
End: 2019-05-16
Attending: FAMILY MEDICINE
Payer: MEDICAID

## 2019-05-16 PROCEDURE — H2035 A/D TX PROGRAM, PER HOUR: HCPCS

## 2019-05-16 PROCEDURE — H2035 A/D TX PROGRAM, PER HOUR: HCPCS | Mod: HQ

## 2019-05-16 PROCEDURE — 10020000 ZZH LODGING PLUS FACILITY CHARGE ADULT

## 2019-05-17 ENCOUNTER — HOSPITAL ENCOUNTER (OUTPATIENT)
Dept: BEHAVIORAL HEALTH | Facility: CLINIC | Age: 24
End: 2019-05-17
Attending: FAMILY MEDICINE
Payer: MEDICAID

## 2019-05-17 PROCEDURE — H2035 A/D TX PROGRAM, PER HOUR: HCPCS

## 2019-05-17 PROCEDURE — 10020000 ZZH LODGING PLUS FACILITY CHARGE ADULT

## 2019-05-17 PROCEDURE — H2035 A/D TX PROGRAM, PER HOUR: HCPCS | Mod: HQ
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
## 2019-05-18 ENCOUNTER — HOSPITAL ENCOUNTER (OUTPATIENT)
Dept: BEHAVIORAL HEALTH | Facility: CLINIC | Age: 24
End: 2019-05-18
Attending: FAMILY MEDICINE
Payer: MEDICAID

## 2019-05-18 PROCEDURE — H2035 A/D TX PROGRAM, PER HOUR: HCPCS | Mod: HQ

## 2019-05-18 PROCEDURE — 10020000 ZZH LODGING PLUS FACILITY CHARGE ADULT

## 2019-05-19 ENCOUNTER — HOSPITAL ENCOUNTER (OUTPATIENT)
Dept: BEHAVIORAL HEALTH | Facility: CLINIC | Age: 24
End: 2019-05-19
Attending: FAMILY MEDICINE
Payer: MEDICAID

## 2019-05-19 PROCEDURE — 10020000 ZZH LODGING PLUS FACILITY CHARGE ADULT

## 2019-05-19 NOTE — PROGRESS NOTES
Comprehensive Summary Update and Review  Counselor met with patient on 5/19/2019 and reviewed the Comprehensive Assessment.     There were no changes/updates identified by patient or in chart entries.

## 2019-05-20 ENCOUNTER — HOSPITAL ENCOUNTER (OUTPATIENT)
Dept: BEHAVIORAL HEALTH | Facility: CLINIC | Age: 24
End: 2019-05-20
Attending: FAMILY MEDICINE
Payer: MEDICAID

## 2019-05-20 PROCEDURE — H2035 A/D TX PROGRAM, PER HOUR: HCPCS | Mod: HQ

## 2019-05-20 PROCEDURE — 10020000 ZZH LODGING PLUS FACILITY CHARGE ADULT

## 2019-05-20 PROCEDURE — H2035 A/D TX PROGRAM, PER HOUR: HCPCS

## 2019-05-21 ENCOUNTER — HOSPITAL ENCOUNTER (OUTPATIENT)
Dept: BEHAVIORAL HEALTH | Facility: CLINIC | Age: 24
End: 2019-05-21
Attending: FAMILY MEDICINE
Payer: MEDICAID

## 2019-05-21 ENCOUNTER — OFFICE VISIT (OUTPATIENT)
Dept: PSYCHIATRY | Facility: CLINIC | Age: 24
End: 2019-05-21
Attending: PSYCHIATRY & NEUROLOGY
Payer: MEDICAID

## 2019-05-21 VITALS — WEIGHT: 125 LBS | HEART RATE: 73 BPM | DIASTOLIC BLOOD PRESSURE: 81 MMHG | SYSTOLIC BLOOD PRESSURE: 149 MMHG

## 2019-05-21 DIAGNOSIS — F41.9 ANXIETY: ICD-10-CM

## 2019-05-21 DIAGNOSIS — F11.20 OPIOID USE DISORDER, SEVERE, ON MAINTENANCE THERAPY (H): Primary | ICD-10-CM

## 2019-05-21 DIAGNOSIS — Z51.81 ENCOUNTER FOR THERAPEUTIC DRUG MONITORING: ICD-10-CM

## 2019-05-21 DIAGNOSIS — F17.200 NICOTINE USE DISORDER: ICD-10-CM

## 2019-05-21 LAB — BENZODIAZ UR QL: NEGATIVE

## 2019-05-21 PROCEDURE — H2035 A/D TX PROGRAM, PER HOUR: HCPCS

## 2019-05-21 PROCEDURE — 10020000 ZZH LODGING PLUS FACILITY CHARGE ADULT

## 2019-05-21 PROCEDURE — H2035 A/D TX PROGRAM, PER HOUR: HCPCS | Mod: HQ

## 2019-05-21 PROCEDURE — G0463 HOSPITAL OUTPT CLINIC VISIT: HCPCS | Mod: ZF

## 2019-05-21 PROCEDURE — 80307 DRUG TEST PRSMV CHEM ANLYZR: CPT | Performed by: FAMILY MEDICINE

## 2019-05-21 RX ORDER — BUPRENORPHINE AND NALOXONE 8; 2 MG/1; MG/1
1 FILM, SOLUBLE BUCCAL; SUBLINGUAL 2 TIMES DAILY
Qty: 28 FILM | Refills: 0 | COMMUNITY
Start: 2019-05-21 | End: 2019-06-08

## 2019-05-21 ASSESSMENT — PAIN SCALES - GENERAL: PAINLEVEL: MODERATE PAIN (4)

## 2019-05-21 NOTE — PATIENT INSTRUCTIONS
Thank you for coming to the PSYCHIATRY CLINIC.    Lab Testing:  If you had lab testing today and your results are reassuring or normal they will be mailed to you or sent through [email or web address] within 7 days.   If the lab tests need quick action we will call you with the results.  The phone number we will call with results is # [phone] (home) . If this is not the best number please call our clinic and change the number.    Medication Refills:  If you need any refills please call your pharmacy and they will contact us. Our fax number for refills is [phone]. Please allow three business for refill processing.   If you need to  your refill at a new pharmacy, please contact the new pharmacy directly. The new pharmacy will help you get your medications transferred.     Scheduling:  If you have any concerns about today's visit or wish to schedule another appointment please call our office during normal business hours [phone] (8-5:00 M-F)    Contact Us:  Please call [phone] during business hours (8-5:00 M-F).  If after clinic hours, or on the weekend, please call  [phone].    Financial Assistance [phone]  [employer] Billing [phone]  [employer] Billing [phone]  Medical Records [phone]      MENTAL HEALTH CRISIS NUMBERS:  [facility]:   [facility] - [phone]   Crisis Residence [facility] - [phone]   Walk-In Counseling [facility] [phone]   COPE 24/7 [place] Mobile Team for Adults - [[phone]]; Child - [[phone]]        [facility]:   [facility] - [phone]   Walk-in counseling [facility] - [phone]   Walk-in counseling [facility] - [phone]   Crisis Residence [facility] - [phone]   Urgent Care Adult Mental Health:   --Drop-in, 24/7 crisis line, and [place] Mobile Team [[phone]]    CRISIS TEXT LINE: Text [phone] from anywhere,  anytime, any crisis 24/7;    OR SEE www.crisistextline.org     Poison Control Center - [phone]    CHILD: [facility] needs assessment team - [phone]     [facility] - [phone]; or [employer] [facility] - [phone]    If you have a medical emergency please call 911or go to the nearest ER.                    _____________________________________________    Again thank you for choosing PSYCHIATRY CLINIC and please let us know how we can best partner with you to improve you and your family's health.  You may be receiving a survey in the mail regarding this appointment. We would love to have your feedback, both positive and negative, so please fill out the survey and return it using the provided envelope. The survey is done by an external company, so your answers are anonymous.
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
## 2019-05-22 ENCOUNTER — HOSPITAL ENCOUNTER (OUTPATIENT)
Dept: BEHAVIORAL HEALTH | Facility: CLINIC | Age: 24
End: 2019-05-22
Attending: FAMILY MEDICINE
Payer: MEDICAID

## 2019-05-22 PROCEDURE — H2035 A/D TX PROGRAM, PER HOUR: HCPCS

## 2019-05-22 PROCEDURE — H2035 A/D TX PROGRAM, PER HOUR: HCPCS | Mod: HQ

## 2019-05-22 PROCEDURE — 10020000 ZZH LODGING PLUS FACILITY CHARGE ADULT

## 2019-05-22 ASSESSMENT — PATIENT HEALTH QUESTIONNAIRE - PHQ9: SUM OF ALL RESPONSES TO PHQ QUESTIONS 1-9: 15

## 2019-05-22 NOTE — PROGRESS NOTES
"Patient:  Jaime Mccormick            Adult CD Progress Note and Treatment Plan Review     Attendance  Please refer to OP BEH CD Adult Attendance Record Documentation Flowsheet    Support group attended this week: Yes    Reporting sobriety: Yes    Treatment Plan     Treatment Plan Review competed on: 5/22/2019    Client preferred learning style:   Hands on  Demonstration    Staff Members contributing: Devaughn Christy [facility]; Coreen Mcmillan [facility]; Camryn Corea [facility]                        Received Supervision: No    Client: Pt contributed to goals and plan.    Client received copy of plan/revised plan: Yes    Client agrees with plan/revised plan: Yes        Changes to Treatment Plan: None    New Goals added since last review: Relapse prevention, build sober support network, actively find a sponsor, and attend 2-3 12-Step meetings per week.     Goals worked on since last review: Sobriety and tx plan assignments.    Strategies effective: Yes    Strategies need these changes: None    1) Care Coordination Activities: Patient expressed an interest in intensive outpatient program at [place] [address].   2) Medical, Mental Health and other appointments the client attended: No appointments this week.  3) Medication issues: No concerns at this time  4) Physical and mental health problems: No concerns at this time  5) Any changes in Vulnerable Adult Status?  No. If yes, add to treatment plan and individual abuse prevention plan.  6) Review and evaluation of the individual abuse prevention plan: Current IAPP for this program is adequate for this client    ASAM Risk Rating:    Dimension 1 0: Pt reported his last use date as 5/8/2019. Pt reported no PAWS symptomatology this past week.     Dimension 2 0: Pt reported having no medical concerns this past week.    Dimension 3 2: Pt denied having suicidal thoughts at this time. Pt reported that the significant change in his mood this past week has been due to him \"feeling better " "everyday.\" Pt denied any change in his stress level this past week. Pt reported using nothing as his coping techniques for difficult emotions this past week.    Dimension 4 1: Pt expresses internal motivation for change. He is active in group process, accepts and provides feedback, has good insight, and appears engaged. Pt is supportive of his group peers. Pt reported that \"his family, partner, and himself\" are what motivated him to be sober and to stay in treatment this week.    Dimension 5 4: Pt reported that his cravings were at a 6 this past week on a scale of 1 to 10, 10 being the highest/ most severe.  Pt reported that \"trying to ignore/ forget about them and taking deep breaths\" have been his coping skills he used to manage cravings this past week.    Dimension 6 3: Pt has expressed an interest in attending [facility] for his aftercare programming once he discharges for [facility].     Guide to C-SSRS Risk Ratings   NO IDEATION:  with no active thoughts IDEATION: with a wish to die. IDEATION: with active thoughts. Risk Ratings   If Yes No No 0 - Very Low Risk   If NA Yes No 1 - Low Risk   If NA Yes Yes 2 - Low/moderate risk   IDEATION: associated thoughts of methods without intent or plan INTENT: Intent to follow through on suicide PLAN: Plan to follow through on suicide Risk Ratings cont...   If Yes No No 3 - Moderate Risk   If Yes Yes No 4 - High Risk   If Yes Yes Yes 5 - High Risk   The patient's ADDITIONAL RISK FACTORS and lack of PROTECTIVE FACTORS may increase their overall suicide risk ratings.     Pt's current risk rating: \"Very Low Risk\"     Any changes in Vulnerable Adult Status? No  If yes, add to treatment plan and individual abuse prevention plan.    Family Involvement:   Pt family week is scheduled for the week of 6/3/2019. Pt stated that his mother might participate in family week.    Data:   Pt offered feedback, had good insight, and patient actively participated in group.     Intervention: "   Counselor feedback  Group feedback  Relapse prevention  Aftercare planning  Cognitive behavior therapy  Counselor feedback  Education  Emotional management  Motivational enhancement therapy   Twelve Step facilitation  Mental health education  Pt and counselor reviewed and signed ISP and assessment summary.    Assessment:   Stages of Change Model  Contemplation     Appears/ Sounds:  Cooperative  Motivated  Engaged    Plan:  Focus on recovery environment  Monitor emotional/physical health    Continue group therapy, go to AA/NA meetings, work with sponsor, build sober support network, engage in daily structured activities, and have sober fun.        ZAK Roman
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
## 2019-05-23 ENCOUNTER — HOSPITAL ENCOUNTER (OUTPATIENT)
Dept: BEHAVIORAL HEALTH | Facility: CLINIC | Age: 24
End: 2019-05-23
Attending: FAMILY MEDICINE
Payer: MEDICAID

## 2019-05-23 PROCEDURE — H2035 A/D TX PROGRAM, PER HOUR: HCPCS | Mod: HQ

## 2019-05-23 PROCEDURE — 10020000 ZZH LODGING PLUS FACILITY CHARGE ADULT

## 2019-05-23 PROCEDURE — H2035 A/D TX PROGRAM, PER HOUR: HCPCS

## 2019-05-24 ENCOUNTER — HOSPITAL ENCOUNTER (OUTPATIENT)
Dept: BEHAVIORAL HEALTH | Facility: CLINIC | Age: 24
End: 2019-05-24
Attending: FAMILY MEDICINE
Payer: MEDICAID

## 2019-05-24 PROCEDURE — 10020000 ZZH LODGING PLUS FACILITY CHARGE ADULT

## 2019-05-24 PROCEDURE — H2035 A/D TX PROGRAM, PER HOUR: HCPCS

## 2019-05-24 PROCEDURE — H2035 A/D TX PROGRAM, PER HOUR: HCPCS | Mod: HQ

## 2019-05-25 ENCOUNTER — HOSPITAL ENCOUNTER (OUTPATIENT)
Dept: BEHAVIORAL HEALTH | Facility: CLINIC | Age: 24
End: 2019-05-25
Attending: FAMILY MEDICINE
Payer: MEDICAID

## 2019-05-25 PROCEDURE — H2035 A/D TX PROGRAM, PER HOUR: HCPCS | Mod: HQ

## 2019-05-25 PROCEDURE — 10020000 ZZH LODGING PLUS FACILITY CHARGE ADULT

## 2019-05-26 ENCOUNTER — HOSPITAL ENCOUNTER (OUTPATIENT)
Dept: BEHAVIORAL HEALTH | Facility: CLINIC | Age: 24
End: 2019-05-26
Attending: FAMILY MEDICINE
Payer: MEDICAID

## 2019-05-26 PROCEDURE — 10020000 ZZH LODGING PLUS FACILITY CHARGE ADULT

## 2019-05-26 PROCEDURE — H2035 A/D TX PROGRAM, PER HOUR: HCPCS | Mod: HQ

## 2019-05-27 ENCOUNTER — HOSPITAL ENCOUNTER (OUTPATIENT)
Dept: BEHAVIORAL HEALTH | Facility: CLINIC | Age: 24
End: 2019-05-27
Attending: FAMILY MEDICINE
Payer: MEDICAID

## 2019-05-27 PROCEDURE — 10020000 ZZH LODGING PLUS FACILITY CHARGE ADULT

## 2019-05-27 PROCEDURE — H2035 A/D TX PROGRAM, PER HOUR: HCPCS

## 2019-05-27 PROCEDURE — H2035 A/D TX PROGRAM, PER HOUR: HCPCS | Mod: HQ

## 2019-05-28 ENCOUNTER — HOSPITAL ENCOUNTER (OUTPATIENT)
Dept: BEHAVIORAL HEALTH | Facility: CLINIC | Age: 24
End: 2019-05-28
Attending: FAMILY MEDICINE
Payer: MEDICAID

## 2019-05-28 PROCEDURE — 10020000 ZZH LODGING PLUS FACILITY CHARGE ADULT

## 2019-05-28 PROCEDURE — H2035 A/D TX PROGRAM, PER HOUR: HCPCS | Mod: HQ

## 2019-05-28 PROCEDURE — H2035 A/D TX PROGRAM, PER HOUR: HCPCS

## 2019-05-28 NOTE — PROGRESS NOTES
"Patient:  Jaime Mccormick            Adult CD Progress Note and Treatment Plan Review     Attendance  Please refer to OP BEH CD Adult Attendance Record Documentation Flowsheet    Support group attended this week: Yes    Reporting sobriety: Yes     Treatment Plan     Treatment Plan Review competed on: 5/28/2019    Client preferred learning style:   Hands on  Demonstration        Staff Members contributing: Devaughn Christy [facility]; Coreen Mcmillan [facility]; Camryn Corea [facility]       Received Supervision: No    Client: pt contributed to goals and plan.    Client received copy of plan/revised plan: Yes    Client agrees with plan/revised plan: Yes        Changes to Treatment Plan: None    New Goals added since last review: None    Goals worked on since last review: Sobriety, tx plan assignments, individual therapy sessions, sponsorship.     Strategies effective: Yes    Strategies need these changes: None    1) Care Coordination Activities: Patient expressed an interest in intensive outpatient program at [place] [address]. Pt reported that he gained sponsorship this past week.   2) Medical, Mental Health and other appointments the client attended: No appointments this week.  3) Medication issues: No concerns at this time  4) Physical and mental health problems: No concerns at this time  5) Any changes in Vulnerable Adult Status? No. If yes, add to treatment plan and individual abuse prevention plan.  6) Review and evaluation of the individual abuse prevention plan: Current IAPP for this program is adequate for this client    ASAM Risk Rating:    Dimension 1 0: Pt reported his last use date as 5/8/2019. Pt reported no PAWS symptomatology this past week.     Dimension 2 0: Pt reported having no medical concerns this past week.    Dimension 3 2: Pt denied having suicidal thoughts at this time. Pt denied any significant changes in his mood this past week. Pt reported a change in his stress level this past week due to \"multiple " "things.\" Pt reported using \"breathing exercises and talking to good people\" as his coping techniques for difficult emotions this past week.    Dimension 4 1: Pt expresses internal motivation for change. He is active in group process, accepts and provides feedback, has good insight, and appears engaged. Pt is supportive of his group peers. Pt reported that thinking \"his life is worth living\" is what motivated him to be sober and to stay in treatment this week.    Dimension 5 4: Pt reported that his cravings were at a 7 this past week on a scale of 1 to 10, 10 being the highest/ most severe. Pt reported that \"getting out of his head and talking with people\" have been his coping skills he used to manage cravings this past week.    Dimension 6 3: Pt has expressed an interest in attending [facility] for his aftercare programming once he discharges for [facility]. Pt reported gaining sponsorship this past week.    Guide to C-SSRS Risk Ratings   NO IDEATION:  with no active thoughts IDEATION: with a wish to die. IDEATION: with active thoughts. Risk Ratings   If Yes No No 0 - Very Low Risk   If NA Yes No 1 - Low Risk   If NA Yes Yes 2 - Low/moderate risk   IDEATION: associated thoughts of methods without intent or plan INTENT: Intent to follow through on suicide PLAN: Plan to follow through on suicide Risk Ratings cont...   If Yes No No 3 - Moderate Risk   If Yes Yes No 4 - High Risk   If Yes Yes Yes 5 - High Risk   The patient's ADDITIONAL RISK FACTORS and lack of PROTECTIVE FACTORS may increase their overall suicide risk ratings.      Pt's current risk rating: \"Very Low Risk\"     Any changes in Vulnerable Adult Status? No  If yes, add to treatment plan and individual abuse prevention plan.    Family Involvement:   Pt family week is scheduled for the week of 6/3/2019. Pt stated that his mother might participate in family week.    Data:   Pt offered feedback, had good insight, and patient actively participated in group. "     Intervention:   Counselor feedback  Group feedback  Relapse prevention  Aftercare planning  Cognitive behavior therapy  Counselor feedback  Education  Emotional management  Motivational enhancement therapy   Twelve Step facilitation  Mental health education  Pt and counselor reviewed and signed ISP and assessment summary.    Assessment:   Stages of Change Model  Contemplation     Appears/ Sounds:  Cooperative  Motivated  Engaged    Plan:  Focus on recovery environment  Monitor emotional/physical health     Continue group therapy, go to AA/NA meetings, work with sponsor, build sober support network, engage in daily structured activities, and have sober fun.      ZAK Roman

## 2019-05-29 ENCOUNTER — HOSPITAL ENCOUNTER (OUTPATIENT)
Dept: BEHAVIORAL HEALTH | Facility: CLINIC | Age: 24
End: 2019-05-29
Attending: FAMILY MEDICINE
Payer: MEDICAID

## 2019-05-29 PROCEDURE — H2035 A/D TX PROGRAM, PER HOUR: HCPCS | Mod: HQ

## 2019-05-29 PROCEDURE — 10020000 ZZH LODGING PLUS FACILITY CHARGE ADULT

## 2019-05-29 NOTE — TELEPHONE ENCOUNTER
A phone call was made as follow-up to the Family Program mailing for the week of 6/3/19. Message left or spoke in person to individuals on [facility].

## 2019-05-30 ENCOUNTER — HOSPITAL ENCOUNTER (OUTPATIENT)
Dept: BEHAVIORAL HEALTH | Facility: CLINIC | Age: 24
End: 2019-05-30
Attending: FAMILY MEDICINE
Payer: MEDICAID

## 2019-05-30 PROCEDURE — H2035 A/D TX PROGRAM, PER HOUR: HCPCS | Mod: HQ

## 2019-05-30 PROCEDURE — H2035 A/D TX PROGRAM, PER HOUR: HCPCS

## 2019-05-30 PROCEDURE — 10020000 ZZH LODGING PLUS FACILITY CHARGE ADULT

## 2019-05-31 ENCOUNTER — HOSPITAL ENCOUNTER (OUTPATIENT)
Dept: BEHAVIORAL HEALTH | Facility: CLINIC | Age: 24
End: 2019-05-31
Attending: FAMILY MEDICINE
Payer: MEDICAID

## 2019-05-31 PROCEDURE — 10020000 ZZH LODGING PLUS FACILITY CHARGE ADULT

## 2019-05-31 PROCEDURE — H2035 A/D TX PROGRAM, PER HOUR: HCPCS

## 2019-05-31 PROCEDURE — H2035 A/D TX PROGRAM, PER HOUR: HCPCS | Mod: HQ

## 2019-06-01 ENCOUNTER — HOSPITAL ENCOUNTER (OUTPATIENT)
Dept: BEHAVIORAL HEALTH | Facility: CLINIC | Age: 24
End: 2019-06-01
Attending: FAMILY MEDICINE
Payer: COMMERCIAL

## 2019-06-01 PROCEDURE — H2035 A/D TX PROGRAM, PER HOUR: HCPCS | Mod: HQ

## 2019-06-01 PROCEDURE — 10020000 ZZH LODGING PLUS FACILITY CHARGE ADULT

## 2019-06-02 ENCOUNTER — HOSPITAL ENCOUNTER (OUTPATIENT)
Dept: BEHAVIORAL HEALTH | Facility: CLINIC | Age: 24
End: 2019-06-02
Attending: FAMILY MEDICINE
Payer: COMMERCIAL

## 2019-06-02 PROCEDURE — H2035 A/D TX PROGRAM, PER HOUR: HCPCS | Mod: HQ

## 2019-06-02 PROCEDURE — 10020000 ZZH LODGING PLUS FACILITY CHARGE ADULT

## 2019-06-03 ENCOUNTER — HOSPITAL ENCOUNTER (OUTPATIENT)
Dept: BEHAVIORAL HEALTH | Facility: CLINIC | Age: 24
End: 2019-06-03
Attending: FAMILY MEDICINE
Payer: COMMERCIAL

## 2019-06-03 PROCEDURE — 10020000 ZZH LODGING PLUS FACILITY CHARGE ADULT

## 2019-06-03 PROCEDURE — H2035 A/D TX PROGRAM, PER HOUR: HCPCS

## 2019-06-03 PROCEDURE — H2035 A/D TX PROGRAM, PER HOUR: HCPCS | Mod: HQ

## 2019-06-03 NOTE — PROGRESS NOTES
"Patient:  Jaime Mccormick            Adult CD Progress Note and Treatment Plan Review     Attendance  Please refer to OP BEH CD Adult Attendance Record Documentation Flowsheet    Support group attended this week: Yes    Reporting sobriety: Yes    Treatment Plan     Treatment Plan Review competed on: 6/3/2019       Client preferred learning style:   Hands on  Demonstration    Staff Members contributing: Devaughn Christy [facility]; Coreen Mcmillan [facility]; Camryn Corea [facility]      Received Supervision: No    Client: Pt contributed to goals and plan.    Client received copy of plan/revised plan: Yes    Client agrees with plan/revised plan: Yes        Changes to Treatment Plan: None    New Goals added since last review: None    Goals worked on since last review: Sobriety, tx plan assignments, individual therapy sessions, sober house search and aftercare programming.     Strategies effective: Yes    Strategies need these changes: None    1) Care Coordination Activities: Patient has an intake interview set for [facility] on 6/12. Pt reported that he still has not obtained a sober home.  2) Medical, Mental Health and other appointments the client attended: No appointments this week.  3) Medication issues: Pt reported that he has ankle pain.  4) Physical and mental health problems: No concerns at this time.  5) Any changes in Vulnerable Adult Status? No. If yes, add to treatment plan and individual abuse prevention plan.  6) Review and evaluation of the individual abuse prevention plan: Current IAPP for this program is adequate for this client    ASAM Risk Rating:    Dimension 1 0: Pt reported his last use date as 5/8/2019. Pt reported no PAWS symptomatology this past week.     Dimension 2 0: Pt reported having ankle pain this past week.    Dimension 3 1: Pt denied having suicidal thoughts at this time. Pt denied any significant changes in his mood or in his stress level this past week. Pt reported using \"breathing exercises\" as his " "coping techniques for difficult emotions this past week.    Dimension 4 1: Pt expresses internal motivation for change. Pt is supportive of his group peers. Pt reported that \"the longer he is sober the happier he is\" is what motivated him to be sober and to stay in treatment this week.    Dimension 5 4: Pt reported that his cravings were at a 6 this past week on a scale of 1 to 10, 10 being the highest/ most severe. Pt reported that \"breathing exercises and filling his mind with positive thoughts\" have been his coping skills he used to manage cravings this past week.    Dimension 6 3: Pt has an intake interview at [address] on 6/12/2019. Pt reported he is still working on obtaining a sober home.        Guide to C-SSRS Risk Ratings   NO IDEATION:  with no active thoughts IDEATION: with a wish to die. IDEATION: with active thoughts. Risk Ratings   If Yes No No 0 - Very Low Risk   If NA Yes No 1 - Low Risk   If NA Yes Yes 2 - Low/moderate risk   IDEATION: associated thoughts of methods without intent or plan INTENT: Intent to follow through on suicide PLAN: Plan to follow through on suicide Risk Ratings cont...   If Yes No No 3 - Moderate Risk   If Yes Yes No 4 - High Risk   If Yes Yes Yes 5 - High Risk   The patient's ADDITIONAL RISK FACTORS and lack of PROTECTIVE FACTORS may increase their overall suicide risk ratings.      Pt's current risk rating: \"Very Low Risk\"     Any changes in Vulnerable Adult Status? No  If yes, add to treatment plan and individual abuse prevention plan.    Family Involvement:   Pt's family did not attend his family week program.     Data:   Pt offered feedback, had good insight, and patient actively participated in group.     Intervention:   Counselor feedback  Group feedback  Relapse prevention  Aftercare planning  Cognitive behavior therapy  Counselor feedback  Education  Emotional management  Motivational enhancement therapy   Twelve Step facilitation  Mental health education  Pt and " counselor reviewed and signed ISP and assessment summary.    Assessment:   Stages of Change Model  Contemplation     Appears/ Sounds:  Cooperative  Motivated  Engaged    Plan:  Focus on recovery environment  Monitor emotional/physical health     Continue group therapy, go to AA/NA meetings, work with sponsor, build sober support network, engage in daily structured activities, and have sober fun.      ZAK Roman

## 2019-06-04 ENCOUNTER — HOSPITAL ENCOUNTER (OUTPATIENT)
Dept: BEHAVIORAL HEALTH | Facility: CLINIC | Age: 24
End: 2019-06-04
Attending: FAMILY MEDICINE
Payer: COMMERCIAL

## 2019-06-04 ENCOUNTER — OFFICE VISIT (OUTPATIENT)
Dept: PSYCHIATRY | Facility: CLINIC | Age: 24
End: 2019-06-04
Attending: PSYCHIATRY & NEUROLOGY
Payer: COMMERCIAL

## 2019-06-04 VITALS — WEIGHT: 128 LBS | HEART RATE: 83 BPM | DIASTOLIC BLOOD PRESSURE: 81 MMHG | SYSTOLIC BLOOD PRESSURE: 134 MMHG

## 2019-06-04 DIAGNOSIS — F41.9 ANXIETY: ICD-10-CM

## 2019-06-04 DIAGNOSIS — F11.20 OPIOID USE DISORDER, SEVERE, ON MAINTENANCE THERAPY (H): Primary | ICD-10-CM

## 2019-06-04 PROCEDURE — H2035 A/D TX PROGRAM, PER HOUR: HCPCS

## 2019-06-04 PROCEDURE — G0463 HOSPITAL OUTPT CLINIC VISIT: HCPCS | Mod: ZF

## 2019-06-04 PROCEDURE — 10020000 ZZH LODGING PLUS FACILITY CHARGE ADULT

## 2019-06-04 PROCEDURE — H2035 A/D TX PROGRAM, PER HOUR: HCPCS | Mod: HQ

## 2019-06-04 RX ORDER — CLONIDINE 0.1 MG/24H
1 PATCH, EXTENDED RELEASE TRANSDERMAL WEEKLY
Qty: 4 PATCH | Refills: 0 | Status: SHIPPED | OUTPATIENT
Start: 2019-06-04 | End: 2019-06-18

## 2019-06-04 ASSESSMENT — PAIN SCALES - GENERAL: PAINLEVEL: MODERATE PAIN (4)
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
## 2019-06-04 NOTE — NURSING NOTE
Chief Complaint   Patient presents with     Recheck Medication     Opioid use disorder, severe, on maintenance therapy

## 2019-06-04 NOTE — PATIENT INSTRUCTIONS
Thank you for coming to the PSYCHIATRY CLINIC.    Lab Testing:  If you had lab testing today and your results are reassuring or normal they will be mailed to you or sent through [employer] within 7 days.   If the lab tests need quick action we will call you with the results.  The phone number we will call with results is # [phone] (home) . If this is not the best number please call our clinic and change the number.    Medication Refills:  If you need any refills please call your pharmacy and they will contact us. Our fax number for refills is [phone]. Please allow three business for refill processing.   If you need to  your refill at a new pharmacy, please contact the new pharmacy directly. The new pharmacy will help you get your medications transferred.     Scheduling:  If you have any concerns about today's visit or wish to schedule another appointment please call our office during normal business hours [phone] (8-5:00 M-F)    Contact Us:  Please call [phone] during business hours (8-5:00 M-F).  If after clinic hours, or on the weekend, please call  [phone].    Financial Assistance [phone]  [facility] Billing [phone]  [facility] Billing [phone]  Medical Records [phone]      MENTAL HEALTH CRISIS NUMBERS:  [facility]:   [facility] - [phone]   Crisis Residence [facility] - [phone]   Walk-In Counseling [facility] [phone]   COPE 24/7 [place] Mobile Team for Adults - [[phone]]; Child - [[phone]]        [facility]:   [facility] - [phone]   Walk-in counseling [facility] - [phone]   Walk-in counseling [facility] - [phone]   Crisis Residence [facility] - [phone]   Urgent Care Adult Mental Health:   --Drop-in, 24/7 crisis line, and [place] Mobile Team [[phone]]    CRISIS TEXT LINE: Text [phone] from anywhere,  anytime, any crisis 24/7;    OR SEE [email or web address]     Poison Control Center - [phone]    CHILD: [facility] needs assessment team - [phone]     [facility] - [phone]; or [facility] - [phone]    If you have a medical emergency please call 911or go to the nearest ER.                    _____________________________________________    Again thank you for choosing PSYCHIATRY CLINIC and please let us know how we can best partner with you to improve you and your family's health.  You may be receiving a survey in the mail regarding this appointment. We would love to have your feedback, both positive and negative, so please fill out the survey and return it using the provided envelope. The survey is done by an external company, so your answers are anonymous.

## 2019-06-05 ENCOUNTER — HOSPITAL ENCOUNTER (OUTPATIENT)
Dept: BEHAVIORAL HEALTH | Facility: CLINIC | Age: 24
End: 2019-06-05
Attending: FAMILY MEDICINE
Payer: COMMERCIAL

## 2019-06-05 PROCEDURE — 10020000 ZZH LODGING PLUS FACILITY CHARGE ADULT

## 2019-06-05 PROCEDURE — H2035 A/D TX PROGRAM, PER HOUR: HCPCS | Mod: HQ

## 2019-06-05 RX ORDER — BUPRENORPHINE HYDROCHLORIDE AND NALOXONE HYDROCHLORIDE DIHYDRATE 8; 2 MG/1; MG/1
1 TABLET SUBLINGUAL 3 TIMES DAILY
COMMUNITY
End: 2019-06-18

## 2019-06-05 ASSESSMENT — PATIENT HEALTH QUESTIONNAIRE - PHQ9: SUM OF ALL RESPONSES TO PHQ QUESTIONS 1-9: 7

## 2019-06-05 NOTE — PROGRESS NOTES
Per Dr. Carroll 20 mg daily dose of suboxone approved for use in [place] according to order from Dr. Pandey:   Increase buprenorphine to 20 mg TDD, split 8 mg/4 mg/8 mg,

## 2019-06-06 ENCOUNTER — HOSPITAL ENCOUNTER (OUTPATIENT)
Dept: BEHAVIORAL HEALTH | Facility: CLINIC | Age: 24
End: 2019-06-06
Attending: FAMILY MEDICINE
Payer: COMMERCIAL

## 2019-06-06 PROCEDURE — H2035 A/D TX PROGRAM, PER HOUR: HCPCS

## 2019-06-06 PROCEDURE — 10020000 ZZH LODGING PLUS FACILITY CHARGE ADULT

## 2019-06-06 PROCEDURE — H2035 A/D TX PROGRAM, PER HOUR: HCPCS | Mod: HQ

## 2019-06-07 ENCOUNTER — HOSPITAL ENCOUNTER (OUTPATIENT)
Dept: BEHAVIORAL HEALTH | Facility: CLINIC | Age: 24
End: 2019-06-07
Attending: FAMILY MEDICINE
Payer: COMMERCIAL

## 2019-06-07 PROCEDURE — H2035 A/D TX PROGRAM, PER HOUR: HCPCS

## 2019-06-07 PROCEDURE — 10020000 ZZH LODGING PLUS FACILITY CHARGE ADULT

## 2019-06-07 PROCEDURE — H2035 A/D TX PROGRAM, PER HOUR: HCPCS | Mod: HQ

## 2019-06-08 ENCOUNTER — HOSPITAL ENCOUNTER (OUTPATIENT)
Dept: BEHAVIORAL HEALTH | Facility: CLINIC | Age: 24
End: 2019-06-08
Attending: FAMILY MEDICINE
Payer: COMMERCIAL

## 2019-06-08 PROCEDURE — 10020000 ZZH LODGING PLUS FACILITY CHARGE ADULT

## 2019-06-08 PROCEDURE — H2035 A/D TX PROGRAM, PER HOUR: HCPCS

## 2019-06-08 NOTE — IP AVS SNAPSHOT
Medication List       Patient:  WASHINGTON CHAVEZ   :  1995   Physician:  No Ref-Primary, Physician           This is your record.  Keep this with you and show to your community pharmacist(s) and physician(s) at each visit.     Allergies:  AMOXICILLIN - Hives               Medications  Valid as of: 2019 -  9:24 AM    Generic Name Brand Name Tablet Size Instructions for use    Buprenorphine HCl-Naloxone HCl SUBOXONE 8-2 MG Place 1 tablet under the tongue 3 times daily Place one tablet under the tongue once every morning, place one-half tablet under the tongue every afternoon and place one tablet under the tongue every evening        cloNIDine CATAPRES-TTS1 0.1 MG/24HR Place 1 patch onto the skin once a week        Escitalopram Oxalate LEXAPRO 10 MG Take 1 tablet (10 mg) by mouth daily        Multiple Vitamins-Minerals THERA-VIT-M  Take 1 tablet by mouth daily        QUEtiapine Fumarate SEROquel 25 MG Take 1-2 tablets (25-50 mg) by mouth nightly as needed (psychotic agitation)        .           .           .           .

## 2019-06-08 NOTE — IP AVS SNAPSHOT
"                  MRN:[id number]                      After Visit Summary   2019    Jiame Mccormick    MRN: [id number]           Visit Information        Provider Department      2019  7:15 AM ADULT LODGING PLUS C [facility]        Your next 10 appointments already scheduled    2019  7:15 AM CDT  Treatment with ADULT LODGING PLUS C  [facility] ([facility]) [address] [place] [id number]  [phone]      Pete 10, 2019  7:15 AM CDT  Treatment with ADULT LODGING PLUS C  [facility] ([facility]) [address] [place] [id number]  [phone]      2019  2:30 PM CDT  Adult Med Follow UP with Noah Pandey MD  Psychiatry Clinic ([facility]) [address] [address]  [phone]         MyChart Information    [employer] lets you send messages to your doctor, view your test results, renew your prescriptions, schedule appointments and more. To sign up, go to [email or web address] . Click on \"Log in\" on the left side of the screen, which will take you to the Welcome page. Then click on \"Sign up Now\" on the right side of the page.     You will be asked to enter the access code listed below, as well as some personal information. Please follow the directions to create your username and password.     Your access code is: [email or web address]  Expires: 2019  9:06 PM     Your access code will  in 60 days. If you need help or a new code, please call your [facility] clinic or [phone].       Care EveryWhere ID    This is your Care EveryWhere ID. This could be used by other organizations to access your [facility] medical records  [id number]       Equal Access to Services    JACQUELYN RODRIGUEZ AH: divya Christopher, " fredo higuera ah. So [facility] [phone].    ATENCIÓN: Si habla español, tiene a schulte disposición servicios gratuitos de asistencia lingüística. Llame al [phone].    We comply with applicable federal civil rights laws and [place] laws. We do not discriminate on the basis of race, color, national origin, age, disability, sex, sexual orientation, or gender identity.

## 2019-06-08 NOTE — PROGRESS NOTES
Nursing Discharge Planning Meeting    Writer completed discharge planning meeting with patient. Discharge is planned for Tuesday, 6/11/2019.    Discussed appropriate follow up care to manage ADIS, MH and medical and to obtain medication refills. Patient given a copy of their current medications for reference. Questions were answered at this time and the patient verbalized an understanding of the post-discharge follow up plan.    Patient has appt to ESTABLISH CARE with [facility], [address] [place] [address]. [place], [facility], [place]  [phone].  Date: 6/11/2019   Time: 1:40 pm             Pt has [facility] appt with Dr. Pandey on June 18, 2019 at 2:30 pm    Pt aware of the above appts    Continue to support patient in discharge planning as needed to assure appropriate continuity of care.     Tobacco Cessation  Patient participated in the nicotine replacement therapy for tobacco cessation or reduction during their treatment programming: No    The patient was provided with community resources for follow-up to continue tobacco cessation support once in the community. Also the patient was encoruaged to discuss their tobacco cessation efforts with the primary care provider.

## 2019-06-09 ENCOUNTER — HOSPITAL ENCOUNTER (OUTPATIENT)
Dept: BEHAVIORAL HEALTH | Facility: CLINIC | Age: 24
End: 2019-06-09
Attending: FAMILY MEDICINE
Payer: COMMERCIAL

## 2019-06-09 PROCEDURE — H2035 A/D TX PROGRAM, PER HOUR: HCPCS

## 2019-06-09 PROCEDURE — 10020000 ZZH LODGING PLUS FACILITY CHARGE ADULT

## 2019-06-10 ENCOUNTER — HOSPITAL ENCOUNTER (OUTPATIENT)
Dept: BEHAVIORAL HEALTH | Facility: CLINIC | Age: 24
End: 2019-06-10
Attending: FAMILY MEDICINE
Payer: COMMERCIAL

## 2019-06-10 PROCEDURE — 10020000 ZZH LODGING PLUS FACILITY CHARGE ADULT

## 2019-06-10 PROCEDURE — H2035 A/D TX PROGRAM, PER HOUR: HCPCS

## 2019-06-10 PROCEDURE — H2035 A/D TX PROGRAM, PER HOUR: HCPCS | Mod: HQ

## 2019-06-10 NOTE — PROGRESS NOTES
SUBJECTIVE:  Jaime Mccormick, a 24 year old male, here today for an appointment to establish care and to discuss the following issues:    1. Having pain in both ankles for a week and a half, with swelling and painful to walk and putting pressure on it,.    HPI:    Where was your previous physician's office?: Too   Physician's Name: does not remember since it has been a long time   Last physical: back in highschool. About 7-8 years ago   Pt permission to access these records? (Care Everywhere or ADRI[facility]N): Yes       Chemical Dependency  Patient is almost done with lodging plus, today is his last day. He is going to transition to a sober house.    Musculoskeletal  -States that he woke up with swelling in both ankles two weeks ago, 6-7 days ago the swelling moved to just his right ankle  -Right ankle is painful, difficult to walk on, most painful in the morning  -Has taken Tylenol/Ibuprofen, ice, elevation, nothing has worked  -Patient plays some volleyball, is the only way he believes he could have injured himself, does not recall specifically injuring his ankle  -Denies recent illnesses, past injuries to his ankles    Skin  -Reports night sweats that began two weeks ago, believes it could be related to his medication    Psych  -Diagnosed with social anxiety when he was young  -Was told by several nurses in [place] that he has depression    -Denies chest pain, SOB, numbness/tingling      Past Medical History:   Diagnosis Date     Anxiety      Chronic kidney disease      Depressive disorder        History reviewed. No pertinent surgical history.    History reviewed. No pertinent family history.    Social History     Tobacco Use     Smoking status: Current Every Day Smoker     Packs/day: 0.50     Smokeless tobacco: Never Used   Substance Use Topics     Alcohol use: Yes       ROS:  CV: NEGATIVE for chest pain, palpitations   GI: NEGATIVE for nausea, abdominal pain, heartburn, or change in bowel habits  :  "NEGATIVE for frequency, dysuria, or hematuria  C: NEGATIVE for fever, chills  E: NEGATIVE for vision changes   R: NEGATIVE for significant cough or SOB  M: NEGATIVE for significant myalgia, POSITIVE arthralgias  N: NEGATIVE for weakness, dizziness or paresthesias or headache    This document serves as a record of the services and decisions personally performed and made by Yoli James PA-C. It was created on her behalf by Navi Caputo, trained medical scribe. The creation of this document is based on the provider's statements to the medical scribe.  Navi Caputo 1:51 PM June 11, 2019    OBJECTIVE:    /82   Pulse 97   Temp 98.5  F (36.9  C) (Oral)   Ht 1.685 m (5' 6.34\")   Wt 59.1 kg (130 lb 3.2 oz)   SpO2 98%   BMI 20.80 kg/m      EXAM:  GENERAL: healthy, alert and no distress  MS: mild swelling of the right lateral ankle, mildly decreased ROM with dorsiflexion, full plantar flexion, tender over the posterior talofib, no tenderness over the posterior malleolus, same criteria for left ankle except minimal tenderness over the posterior talofib and dorsiflexion normal  SKIN: no suspicious lesions or rashes  NEURO: Normal strength and tone, mentation intact and speech normal  PSYCH: mentation appears normal, affect normal/bright    Diagnostic Test Results:  none     ASSESSMENT/PLAN:    ICD-10-CM    1. Sprain of posterior talofibular ligament of ankle, unspecified laterality, initial encounter S93.499A XR Ankle Bilateral G/E 3 Views   2. Social anxiety disorder F40.10 MENTAL HEALTH REFERRAL  - Adult; Psychiatry and Medication Management; Psychiatry; [facility]: Psychiatry [facility] [phone]; We will contact you to schedule the appointment or please call with any questions   3. Moderate recurrent major depression (H) F33.1 MENTAL HEALTH REFERRAL  - Adult; Psychiatry and Medication Management; Psychiatry; [facility]: Psychiatry [facility] [phone]; We will contact you to schedule the appointment or please call " with any questions          Patient Instructions   Get xrays to rule out fracture  Use ibuprofen and tylenol as needed  Elevate when you can  Ice 20 min on 20 min off and repeat often  This will take about 4-6 weeks to heal, if it's still bothering you, let me know and I'll refer you to ortho  Follow up with psychiatry  Return to clinic for any new or worsening symptoms or go to ER Urgent care in off hours    Patient Education     Treating Ankle Sprains  Treatment will depend on how bad your sprain is. For a severe sprain, healing may take 3 months or more.  Right after your injury: Use R.I.C.E.    BIG: Rest: At first, keep weight off the ankle as much as you can. You may be given crutches to help you walk without putting weight on the ankle.    BIG: Ice: Put an ice pack on the ankle for 20 minutes. Remove the pack and wait at least 30 minutes. Repeat for up to 3 days. This helps reduce swelling.    BIG: Compression: To reduce swelling and keep the joint stable, you may need to wrap the ankle with an elastic bandage. For more severe sprains, you may need an ankle brace, a boot, or a cast.    BIG: Elevation: To reduce swelling, keep your ankle raised above your heart when you sit or lie down.  Medicine  Your healthcare provider may suggest oral nonsteroidal anti-inflammatory medicine (NSAIDs), such as ibuprofen. This relieves the pain and helps reduce swelling. Be sure to take your medicine as directed.  Exercises    After about 2 to 3 weeks, you may be given exercises to strengthen the ligaments and muscles in the ankle. Doing these exercises will help prevent another ankle sprain. Exercises may include standing on your toes and then on your heels and doing ankle curls.    Sit on the edge of a sturdy table or lie on your back.    Pull your toes toward you. Then point them away from you. Repeat for 2 to 3 minutes.  Date Last Reviewed: 1/1/2018 2000-2018 [employer]. [address] [facility] [place] [address]. All rights reserved. This information is not intended as a substitute for professional medical care. Always follow your healthcare professional's instructions.                 The information in this document, created by the medical scribe for me, accurately reflects the services I personally performed and the decisions made by me. I have reviewed and approved this document for accuracy prior to leaving the patient care area.  June 11, 2019 1:51 PM    Yoli James PA-C  [facility]

## 2019-06-10 NOTE — PROGRESS NOTES
"Pt presented his \"Use History\" in group. Pt described his early, middle, and late stages of use. Pt also identified and tracked the resulting consequences of his use which started at the age of [age]. Pt was open to feedback and questions for group peers. Group peers were respectful and asked appropriate questions.   "

## 2019-06-10 NOTE — PROGRESS NOTES
INDIVIDUAL SESSION SUMMARY    D) Met with client on 6/10/19 from 12:30pm-1:30pm.     Client expressed anxiety about leaving treatment. Client expressed excitement and worry over their sober living situation. Client discussed their need re: healthy boundaries and communication with their mother. Client expressed feeling highly triggered and stressed over an incident re: two peers that were discharged from treatment. Client expressed their pain, anxiety, and strength in sharing their relationship history in group.     I) Individual session with client. Provided client with verbal interventions including: validation, nurturing, compassion and support. Discussed the benefits of: healthy boundaries, daily structure, connection, sobriety, therapy, self-compassion, assertive communication, identifying and verbalizing unmet needs. Provided psycho-education on reframing techniques and healthy boundaries. Taught thought reframing, thought substitution, or thought stopping to improve control over impulsive actions. Discussed the importance of recovery behaviors such as utilizing sponsorship, sober support network, going to meetings, daily rituals, and goal setting. Highlighted client's strengths including: resilience, humor, kindness, internal light.      A) Client appears to have experienced early attachment disruption, resulting in lack of trust, loss of safety, fear of abandonment, and symptoms of co-dependency. Client appears emotionally constricted and to lack skills for emotional regulation and stress management. Client appears to lack a sober support network. Client appears to lack a daily routine, meaningful activities, and a sense of purpose. Client appears to have strong motivation at this time and would benefit from continuing support to help with relapse prevention, emotional regulation, impulse control, and increasing self-esteem.     P) No future sessions scheduled. This therapist has provided the client with  several therapist referrals to contact in the community.     Keri Mead, Student Intern  6/10/2019

## 2019-06-11 ENCOUNTER — HOSPITAL ENCOUNTER (OUTPATIENT)
Dept: GENERAL RADIOLOGY | Facility: CLINIC | Age: 24
Discharge: HOME | End: 2019-06-11
Attending: PHYSICIAN ASSISTANT | Admitting: PHYSICIAN ASSISTANT
Payer: COMMERCIAL

## 2019-06-11 ENCOUNTER — OFFICE VISIT (OUTPATIENT)
Dept: FAMILY MEDICINE | Facility: CLINIC | Age: 24
End: 2019-06-11
Payer: COMMERCIAL

## 2019-06-11 VITALS
TEMPERATURE: 98.5 F | BODY MASS INDEX: 20.93 KG/M2 | HEIGHT: 66 IN | DIASTOLIC BLOOD PRESSURE: 82 MMHG | SYSTOLIC BLOOD PRESSURE: 138 MMHG | OXYGEN SATURATION: 98 % | WEIGHT: 130.2 LBS | HEART RATE: 97 BPM

## 2019-06-11 DIAGNOSIS — F33.1 MODERATE RECURRENT MAJOR DEPRESSION (H): ICD-10-CM

## 2019-06-11 DIAGNOSIS — F10.239 ALCOHOL DEPENDENCE WITH WITHDRAWAL WITH COMPLICATION (H): ICD-10-CM

## 2019-06-11 DIAGNOSIS — S93.499A: ICD-10-CM

## 2019-06-11 DIAGNOSIS — F40.10 SOCIAL ANXIETY DISORDER: ICD-10-CM

## 2019-06-11 DIAGNOSIS — S93.499A: Primary | ICD-10-CM

## 2019-06-11 DIAGNOSIS — F11.20 UNCOMPLICATED OPIOID DEPENDENCE (H): ICD-10-CM

## 2019-06-11 PROCEDURE — 73610 X-RAY EXAM OF ANKLE: CPT | Mod: 50

## 2019-06-11 PROCEDURE — 99203 OFFICE O/P NEW LOW 30 MIN: CPT | Performed by: PHYSICIAN ASSISTANT

## 2019-06-11 RX ORDER — ESCITALOPRAM OXALATE 10 MG/1
10 TABLET ORAL DAILY
Qty: 30 TABLET | Refills: 0 | Status: SHIPPED | OUTPATIENT
Start: 2019-06-11 | End: 2019-06-18

## 2019-06-11 RX ORDER — QUETIAPINE FUMARATE 25 MG/1
25-50 TABLET, FILM COATED ORAL
Qty: 60 TABLET | Refills: 0 | Status: SHIPPED | OUTPATIENT
Start: 2019-06-11 | End: 2019-06-18

## 2019-06-11 ASSESSMENT — MIFFLIN-ST. JEOR: SCORE: 1528.71

## 2019-06-11 NOTE — PROGRESS NOTES
[address]  [place] [address]          Jaime Mccormick, 1995, was admitted for evaluation/treatment of chemical dependency at [facility].  This person took part in these program(s):    ______ The Inpatient Program   ______ The Outpatient Program   ___X___ The Lodging Plus Program   ______ Lodging Day Outpatient       Date admitted: 5/14/2019  Date discharged: 6/11/2019    Type of discharge:   ___X___ Satisfactory - completed evaluation / treatment   ______ Discharged without completing   ______ Behavioral discharge   ______ Transferred to another chemical dependency program   ______ Transferred to another type of service   ______ Left against medical advice (AMA) / Eloped             Counselor: ZAK Roman                       Date: 6/11/2019             Time: 2:46 PM

## 2019-06-11 NOTE — PROGRESS NOTES
[facility] Discharge Summary/Instructions     Patient: Jaime Mccormick  MRN: [id number]   : 1995 Age: 24 year old Sex: male   -  Focus of Treatment / Discharge Recommendations    Personal Safety/ Management of Symptoms    * Follow your safety plan. Report increased symptoms to your care team                    and /or go to the nearest Emergency Department.    * Call crisis lines as needed    [facility] [phone]               [facility] [phone]  [facility] [phone]              [facility] [phone]  [facility] [phone]              [facility] [phone]  [facility] [phone]          National Suicide Prevention [phone]  [facility] [phone]            Suicide Prevention [phone]  [facility] [phone]    Abstinence/Relapse Prevention  * Take all medicines as directed.  Carry a current list of medicines with you.  * Use coping skills.  * Do not use illicit (street) drugs, controlled substances (narcotics) or alcohol.    Develop/Improve Independent Living/Socialization Skills.    Community Resources/Supports and Discharge Planning.  Follow up with psychiatrist. Next visit: Set up appointment    Follow up with your therapist. Next visit: schedule from referrals.    Go to group therapy and / or support groups at: [facility]    See your medical doctor as needed.    Check into sober house.        Client Signature:_______________________   Date / Time:___________  Staff Signature:________________________   Date / Time:___________

## 2019-06-11 NOTE — PATIENT INSTRUCTIONS
Get xrays to rule out fracture  Use ibuprofen and tylenol as needed  Elevate when you can  Ice 20 min on 20 min off and repeat often  This will take about 4-6 weeks to heal, if it's still bothering you, let me know and I'll refer you to ortho  Follow up with psychiatry  Return to clinic for any new or worsening symptoms or go to ER Urgent care in off hours    Patient Education     Treating Ankle Sprains  Treatment will depend on how bad your sprain is. For a severe sprain, healing may take 3 months or more.  Right after your injury: Use R.I.C.E.    BIG: Rest: At first, keep weight off the ankle as much as you can. You may be given crutches to help you walk without putting weight on the ankle.    BIG: Ice: Put an ice pack on the ankle for 20 minutes. Remove the pack and wait at least 30 minutes. Repeat for up to 3 days. This helps reduce swelling.    BIG: Compression: To reduce swelling and keep the joint stable, you may need to wrap the ankle with an elastic bandage. For more severe sprains, you may need an ankle brace, a boot, or a cast.    BIG: Elevation: To reduce swelling, keep your ankle raised above your heart when you sit or lie down.  Medicine  Your healthcare provider may suggest oral nonsteroidal anti-inflammatory medicine (NSAIDs), such as ibuprofen. This relieves the pain and helps reduce swelling. Be sure to take your medicine as directed.  Exercises    After about 2 to 3 weeks, you may be given exercises to strengthen the ligaments and muscles in the ankle. Doing these exercises will help prevent another ankle sprain. Exercises may include standing on your toes and then on your heels and doing ankle curls.    Sit on the edge of a sturdy table or lie on your back.    Pull your toes toward you. Then point them away from you. Repeat for 2 to 3 minutes.  Date Last Reviewed: 1/1/2018 2000-2018 [employer]. [address] [facility] [place] [address]. All rights reserved. This information is  not intended as a substitute for professional medical care. Always follow your healthcare professional's instructions.
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
## 2019-06-18 ENCOUNTER — TELEPHONE (OUTPATIENT)
Dept: PSYCHIATRY | Facility: CLINIC | Age: 24
End: 2019-06-18

## 2019-06-18 ENCOUNTER — OFFICE VISIT (OUTPATIENT)
Dept: PSYCHIATRY | Facility: CLINIC | Age: 24
End: 2019-06-18
Attending: PSYCHIATRY & NEUROLOGY
Payer: COMMERCIAL

## 2019-06-18 VITALS
SYSTOLIC BLOOD PRESSURE: 134 MMHG | DIASTOLIC BLOOD PRESSURE: 79 MMHG | WEIGHT: 130 LBS | HEART RATE: 79 BPM | BODY MASS INDEX: 20.77 KG/M2

## 2019-06-18 DIAGNOSIS — F11.20 OPIOID USE DISORDER, SEVERE, ON MAINTENANCE THERAPY (H): ICD-10-CM

## 2019-06-18 DIAGNOSIS — F10.239 ALCOHOL DEPENDENCE WITH WITHDRAWAL WITH COMPLICATION (H): ICD-10-CM

## 2019-06-18 DIAGNOSIS — F41.9 ANXIETY: ICD-10-CM

## 2019-06-18 DIAGNOSIS — F11.20 UNCOMPLICATED OPIOID DEPENDENCE (H): ICD-10-CM

## 2019-06-18 DIAGNOSIS — F11.20 OPIOID USE DISORDER, SEVERE, DEPENDENCE (H): Primary | ICD-10-CM

## 2019-06-18 PROCEDURE — G0463 HOSPITAL OUTPT CLINIC VISIT: HCPCS | Mod: ZF

## 2019-06-18 RX ORDER — CLONIDINE 0.1 MG/24H
1 PATCH, EXTENDED RELEASE TRANSDERMAL WEEKLY
Qty: 4 PATCH | Refills: 1 | Status: SHIPPED | OUTPATIENT
Start: 2019-06-18 | End: 2019-09-03 | Stop reason: DRUGHIGH

## 2019-06-18 RX ORDER — BUPRENORPHINE AND NALOXONE 4; 1 MG/1; MG/1
1 FILM, SOLUBLE BUCCAL; SUBLINGUAL
Qty: 28 EACH | Refills: 0 | COMMUNITY
Start: 2019-06-18 | End: 2019-08-13 | Stop reason: DRUGHIGH

## 2019-06-18 RX ORDER — BUPRENORPHINE HYDROCHLORIDE AND NALOXONE HYDROCHLORIDE DIHYDRATE 8; 2 MG/1; MG/1
1 TABLET SUBLINGUAL 2 TIMES DAILY
Qty: 56 TABLET | Refills: 0 | COMMUNITY
Start: 2019-06-18 | End: 2019-07-10

## 2019-06-18 RX ORDER — ESCITALOPRAM OXALATE 20 MG/1
20 TABLET ORAL DAILY
Qty: 30 TABLET | Refills: 1 | Status: SHIPPED | OUTPATIENT
Start: 2019-06-18 | End: 2019-07-30

## 2019-06-18 RX ORDER — QUETIAPINE FUMARATE 25 MG/1
25-75 TABLET, FILM COATED ORAL
Qty: 75 TABLET | Refills: 1 | Status: SHIPPED | OUTPATIENT
Start: 2019-06-18 | End: 2019-07-30

## 2019-06-18 ASSESSMENT — PATIENT HEALTH QUESTIONNAIRE - PHQ9: SUM OF ALL RESPONSES TO PHQ QUESTIONS 1-9: 5

## 2019-06-18 ASSESSMENT — PAIN SCALES - GENERAL: PAINLEVEL: NO PAIN (0)
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
## 2019-06-18 NOTE — LETTER
June 19, 2019      TO: Jaime Contrerasainlsathya  [address]  [place] [address]       To Whom it May Concern,       This request is in response to the denial of coverage for buprenorphine-naloxone (SUBOXONE) 2-0.5 MG SUBL sublingual tablet- Take 2 tabs (4 mg) under the tongue before lunch #60 with no additional refills for approximately a 30 day supply. We kindly ask that you reconsider the decision to deny coverage of this medication. Jaime has recently been diagnosed with Opoid use disorder, severe, on maintenance therapy and is stable on the current dose of the medication. [place] has been taking Suboxone 20 mg daily since the past few months. He is unable to take 1/2 of the Suboxone 8-2 mg due to history of heroin use and cutting the tabs in half has been a trigger for the patient. There are no other medications used for opioid dependence maintenance therapy. He is stable on this medication and dose has been able to abstain from alcohol or drug use. Patient will experience withdrawal without this medication and will out him at a risk for relapse.       Sincerely,      Noah Pandey MD

## 2019-06-18 NOTE — TELEPHONE ENCOUNTER
Insurance doesn't cover 2 tabs per day, The 4-1mg doesn't come in tablet for so we switched it to the 2-0.5mg.      Over MH units(ratio max=28.000);Over MH   quantity REM(28);Over MH units(0);      [employer]  ID: [id number]  [phone]

## 2019-06-18 NOTE — PATIENT INSTRUCTIONS
Thank you for coming to the PSYCHIATRY CLINIC.    Lab Testing:  If you had lab testing today and your results are reassuring or normal they will be mailed to you or sent through [employer] within 7 days.   If the lab tests need quick action we will call you with the results.  The phone number we will call with results is # [phone] (home) . If this is not the best number please call our clinic and change the number.    Medication Refills:  If you need any refills please call your pharmacy and they will contact us. Our fax number for refills is [phone]. Please allow three business for refill processing.   If you need to  your refill at a new pharmacy, please contact the new pharmacy directly. The new pharmacy will help you get your medications transferred.     Scheduling:  If you have any concerns about today's visit or wish to schedule another appointment please call our office during normal business hours [phone] (8-5:00 M-F)    Contact Us:  Please call [phone] during business hours (8-5:00 M-F).  If after clinic hours, or on the weekend, please call  [phone].    Financial Assistance [phone]  [facility] Billing [phone]  [facility] Billing [phone]  Medical Records [phone]      MENTAL HEALTH CRISIS NUMBERS:  [facility]:   [facility] - [phone]   Crisis Residence [facility] - [phone]   Walk-In Counseling [facility] [phone]   COPE 24/7 [place] Mobile Team for Adults - [[phone]]; Child - [[phone]]        [facility]:   [facility] - [phone]   Walk-in counseling [facility] - [phone]   Walk-in counseling [facility] - [phone]   Crisis Residence [facility] - [phone]   Urgent Care Adult Mental Health:   --Drop-in, 24/7 crisis line, and [place] Mobile Team [[phone]]    CRISIS TEXT LINE: Text [phone] from anywhere,  anytime, any crisis 24/7;    OR SEE www.crisistextline.org     Poison Control Center - [phone]    CHILD: [facility] needs assessment team - [phone]     [facility] - [phone]; or [facility] - [phone]    If you have a medical emergency please call 911or go to the nearest ER.                    _____________________________________________    Again thank you for choosing PSYCHIATRY CLINIC and please let us know how we can best partner with you to improve you and your family's health.  You may be receiving a survey in the mail regarding this appointment. We would love to have your feedback, both positive and negative, so please fill out the survey and return it using the provided envelope. The survey is done by an external company, so your answers are anonymous.

## 2019-06-18 NOTE — TELEPHONE ENCOUNTER
Writer placed a call to pharmacy and confirmed a PA is needed for Suboxone 2-0.5 mg tab- take 2 daily at noon. Suboxone 4-1 mg tab was called in but does not come in tabs. Writer agreed to start a PA.

## 2019-06-18 NOTE — TELEPHONE ENCOUNTER
Prior Authorization Retail Medication Request    Medication/Dose: Suboxone 2-0.5 Sublingual tabs- Place 2 tabs under the tongue daily (before lunch) - Sublingual  ICD code (if different than what is on RX):  Uncomplicated opioid dependence (H) [F11.20]  Previously Tried and Failed:  Subutex, Atarax, Trazodone   Rationale:  Patient has been recently diagnosed with opioid use disorder, severe, in stabilization phase on buprenorphine, current dose adequate, but breaking tab midday is triggering. Patient will be taking Suboxone 20 mg daily. He has been taking this medication since the past few months and symptoms have be stabilized. A denial in coverage of this medication could lead to relapse and possible hospitalization.       Insurance Name:  Not provided   Insurance ID:  Not provided       Pharmacy Information (if different than what is on RX)  Name:  Same   Phone:  Same

## 2019-06-19 RX ORDER — BUPRENORPHINE HYDROCHLORIDE AND NALOXONE HYDROCHLORIDE DIHYDRATE 2; .5 MG/1; MG/1
TABLET SUBLINGUAL
Qty: 60 TABLET | Refills: 0
Start: 2019-06-19 | End: 2019-07-23

## 2019-06-19 NOTE — TELEPHONE ENCOUNTER
Writer placed a call to [facility] pharmacy at [phone] and confirmed a new scrip for Suboxone 2-0.5 mg is not needed. They have that script on file. Writer will change the chart to reflect this.

## 2019-06-19 NOTE — TELEPHONE ENCOUNTER
Central Prior Authorization Team   Phone: [phone]    PA Initiation    Medication: Suboxone 2-0.5 Sublingual  Insurance Company: [employer] - Phone [phone] Fax [phone]  Pharmacy Filling the Rx: [place], [place] - [address]  Filling Pharmacy Phone: [phone]  Filling Pharmacy Fax:    Start Date: 6/19/2019

## 2019-06-19 NOTE — TELEPHONE ENCOUNTER
Sara Barcenas  You 1 hour ago (1:27 PM)      Hi I talked to [facility] and they are requesting a letter of medical necessity. They stated the requested medication has a quantity limit of 1 per day and needs to know why the pt is unable to take 1/2 of the 8-2 or the 4-1 film and needs to be in writing to appeal. Please let me know when letter of medical necessity is available.     Thank you,   Sara    Routing comment      Letter drafted and routed to PA team

## 2019-06-20 NOTE — TELEPHONE ENCOUNTER
Medication Appeal Initiation    We have initiated an appeal for the requested medication:  Medication: Suboxone 2-0.5 Sublingual  Appeal Start Date:  6/20/2019  Insurance Company: [employer] - Phone [phone] Fax [phone]  Comments:  Faxed letter of medical necessity

## 2019-06-24 NOTE — TELEPHONE ENCOUNTER
Prior Authorization Approval    Authorization Effective Date: 6/24/2019  Authorization Expiration Date: 9/23/2019  Medication: Suboxone 4-1 FILM Sublingual - approved  Approved Dose/Quantity:   Reference #:     Insurance Company: [employer] - Phone [phone] Fax [phone]  Expected CoPay: n/a     CoPay Card Available:      [employer] Assistance Needed:    Which Pharmacy is filling the prescription (Not needed for infusion/clinic administered): [facility] [place], [place] - [address]  Pharmacy Notified: Yes  Patient Notified: Yes    Pharmacy does not have the updated script

## 2019-06-24 NOTE — TELEPHONE ENCOUNTER
Sara Barcenas  You 39 minutes ago (2:49 PM)      Hi, we have an approval for the buprenorphine-naloxone 4-1 film however, the pharmacy does not have a script for that. Can you please send the script for buprenorphine-naloxone 4-1 film to pharmacy on file? Pharmacy has the approval information.     Thank you,   Sara    Routing comment      Meds called into pharmacist, Alexandria Ibrahim also asked to discontinue suboxone 2-0.5 mg tab- take 2 tabs before lunch   Patient notified of the refill and approval     No further action needed by this writer

## 2019-06-24 NOTE — TELEPHONE ENCOUNTER
PA Initiation    Medication: Suboxone 4-1 film  Insurance Company: [employer] - Phone [phone] Fax [phone]  Pharmacy Filling the Rx: [place], [place] - [address]  Filling Pharmacy Phone: [phone]  Filling Pharmacy Fax:    Start Date: 6/19/2019    Updated PA request with new medication

## 2019-07-09 ENCOUNTER — OFFICE VISIT (OUTPATIENT)
Dept: PSYCHIATRY | Facility: CLINIC | Age: 24
End: 2019-07-09
Attending: PSYCHIATRY & NEUROLOGY
Payer: COMMERCIAL

## 2019-07-09 VITALS
DIASTOLIC BLOOD PRESSURE: 84 MMHG | WEIGHT: 137.4 LBS | BODY MASS INDEX: 21.95 KG/M2 | HEART RATE: 80 BPM | SYSTOLIC BLOOD PRESSURE: 138 MMHG

## 2019-07-09 DIAGNOSIS — F41.9 ANXIETY: ICD-10-CM

## 2019-07-09 DIAGNOSIS — F11.20 OPIOID USE DISORDER, SEVERE, ON MAINTENANCE THERAPY (H): Primary | ICD-10-CM

## 2019-07-09 DIAGNOSIS — Z51.81 ENCOUNTER FOR THERAPEUTIC DRUG MONITORING: ICD-10-CM

## 2019-07-09 PROCEDURE — G0463 HOSPITAL OUTPT CLINIC VISIT: HCPCS | Mod: ZF

## 2019-07-09 RX ORDER — BUPRENORPHINE HYDROCHLORIDE AND NALOXONE HYDROCHLORIDE DIHYDRATE 2; .5 MG/1; MG/1
TABLET SUBLINGUAL
Qty: 60 TABLET | Refills: 0 | Status: CANCELLED | OUTPATIENT
Start: 2019-07-09

## 2019-07-09 ASSESSMENT — PAIN SCALES - GENERAL: PAINLEVEL: NO PAIN (0)

## 2019-07-09 NOTE — PATIENT INSTRUCTIONS
Thank you for coming to the PSYCHIATRY CLINIC.    Lab Testing:  If you had lab testing today and your results are reassuring or normal they will be mailed to you or sent through [employer] within 7 days.   If the lab tests need quick action we will call you with the results.  The phone number we will call with results is # [phone] (home) . If this is not the best number please call our clinic and change the number.    Medication Refills:  If you need any refills please call your pharmacy and they will contact us. Our fax number for refills is [phone]. Please allow three business for refill processing.   If you need to  your refill at a new pharmacy, please contact the new pharmacy directly. The new pharmacy will help you get your medications transferred.     Scheduling:  If you have any concerns about today's visit or wish to schedule another appointment please call our office during normal business hours [phone] (8-5:00 M-F)    Contact Us:  Please call [phone] during business hours (8-5:00 M-F).  If after clinic hours, or on the weekend, please call  [phone].    Financial Assistance [phone]  [facility] Billing [phone]  Central Billing Office, [facility]: [phone]  [place] Billing [phone]  Medical Records [phone]      MENTAL HEALTH CRISIS NUMBERS:  [facility]:   [facility] - [phone]   Crisis Residence [facility] - [phone]   Walk-In Counseling [facility] [phone]   COPE 24/7 [place] Mobile Team for Adults - [[phone]]; Child - [[phone]]        [facility]:   [facility] - [phone]   Walk-in counseling [facility] - [phone]   Walk-in counseling [facility] - [phone]   Crisis Residence [facility] - [phone]   Urgent Care Adult Mental Health:   --Drop-in, 24/7 crisis line, and [place] Mobile Team  [[phone]]    CRISIS TEXT LINE: Text [phone] from anywhere, anytime, any crisis 24/7;    OR SEE www.crisistextline.org     Poison Control Center - [phone]    CHILD: [facility] needs assessment team - [phone]     [facility] - [phone]; or [facility] - [phone]    If you have a medical emergency please call 911or go to the nearest ER.                    _____________________________________________    Again thank you for choosing PSYCHIATRY CLINIC and please let us know how we can best partner with you to improve you and your family's health.  You may be receiving a survey in the mail regarding this appointment. We would love to have your feedback, both positive and negative, so please fill out the survey and return it using the provided envelope. The survey is done by an external company, so your answers are anonymous.
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
## 2019-07-10 DIAGNOSIS — F11.20 OPIOID USE DISORDER, SEVERE, DEPENDENCE (H): Primary | ICD-10-CM

## 2019-07-10 RX ORDER — BUPRENORPHINE HYDROCHLORIDE AND NALOXONE HYDROCHLORIDE DIHYDRATE 8; 2 MG/1; MG/1
1 TABLET SUBLINGUAL 2 TIMES DAILY
Qty: 60 TABLET | Refills: 0 | Status: SHIPPED | OUTPATIENT
Start: 2019-07-10 | End: 2019-07-23

## 2019-07-10 ASSESSMENT — PATIENT HEALTH QUESTIONNAIRE - PHQ9: SUM OF ALL RESPONSES TO PHQ QUESTIONS 1-9: 4

## 2019-07-10 NOTE — TELEPHONE ENCOUNTER
Last seen: 7/9/19  RTC: 3 weeks   Cancel: none   No-show: none   Next appt: 7/30/19    Incoming refill from patient via phone    Medication requested:buprenorphine-naloxone (SUBOXONE) 8-2 MG SUBL sublingual tablet   Directions: Place 1 tablet under the tongue 2 times daily - Sublingual  Qty: 60  Last refilled: 6/18/19, 6/4/19, 5/22/19    Post reviewing the chart, it appears patient should have 7 days available. Writer called the pharmacy and confirmed there are no refills on file. Placed a call to patient who confirmed he only has one day supply left due to taking half tab when a PA was in process for Suboxone 4 mg tabs.    Routed to provider for approval

## 2019-07-10 NOTE — TELEPHONE ENCOUNTER
[facility] Call Center    Phone Message    May a detailed message be left on voicemail: yes    Reason for Call: Medication Refill Request    Has the patient contacted the pharmacy for the refill? Yes   Name of medication being requested: suboxone 8mg  Provider who prescribed the medication: Noah Pandey MD  Pharmacy: [facility] [address]  Date medication is needed: ASAP - pt has one tablet remaining      Action Taken: Message routed to:  Other: Nursing pool

## 2019-07-11 NOTE — TELEPHONE ENCOUNTER
Writer discussed case with provider who agreed to call in the script to the pharmacy.     No further action needed by this writer

## 2019-07-23 ENCOUNTER — TELEPHONE (OUTPATIENT)
Dept: PSYCHIATRY | Facility: CLINIC | Age: 24
End: 2019-07-23

## 2019-07-23 DIAGNOSIS — F11.20 OPIOID USE DISORDER, SEVERE, DEPENDENCE (H): ICD-10-CM

## 2019-07-23 RX ORDER — BUPRENORPHINE HYDROCHLORIDE AND NALOXONE HYDROCHLORIDE DIHYDRATE 2; .5 MG/1; MG/1
TABLET SUBLINGUAL
Qty: 60 TABLET | Refills: 0 | Status: SHIPPED | OUTPATIENT
Start: 2019-07-23 | End: 2019-08-22

## 2019-07-23 RX ORDER — BUPRENORPHINE HYDROCHLORIDE AND NALOXONE HYDROCHLORIDE DIHYDRATE 8; 2 MG/1; MG/1
1 TABLET SUBLINGUAL 2 TIMES DAILY
Qty: 60 TABLET | Refills: 0 | Status: SHIPPED | OUTPATIENT
Start: 2019-07-23 | End: 2019-08-22

## 2019-07-23 NOTE — TELEPHONE ENCOUNTER
FW: Dannie refill (suboxone)   Received: Today   Message Contents   Karen Myers, Karen Reyes, RN          Previous Messages      ----- Message -----   From: Alcira Kwong   Sent: 7/23/2019  11:40 AM   To: [facility]   Subject: Dannie refill (suboxone)                       Caller:  self   Relationship to pt: self   Medication:   suboxone 8 mg tab (only got 24 instead of 56 last time he picked them up), suboxone 4 mg strip, quetiapine   How many days left of med do you have left (if >3 day supply, redirect to pharmacy): out of the strips for a couple days, has one tablet left for [facility]   Pharmacy and location:   Pending appt date:  7/30   Okay to leave detailed VM:  [phone]      Last seen: 7/9/19  RTC: 4 weeks   Cancel: none   No-show: none   Next appt: 7/30/19    Incoming refill from patient via Phone     Medication requested: buprenorphine-naloxone (SUBOXONE) 8-2 MG SUBL sublingual tablet  Directions: Place 1 tablet under the tongue 2 times daily - Sublingual  Qty: 60  Last refilled: 7/12/19 #24, 6/18 #28, 6/4/19 #14    Medication requested:buprenorphine-naloxone (SUBOXONE) 2-0.5 MG SUBL sublingual tablet  Directions: Take 2 tabs (4 mg) under the tongue before lunch  Qty: 60  Last refilled: 7/9/19 #14, 6/30/19, 6/24/19    Medication requested: QUEtiapine (SEROQUEL) 25 MG tablet  Directions: Take 1-3 tablets (25-75 mg) by mouth nightly as needed (agitation) - Oral  Qty: 75  Last refilled: 6/18/19  - Still has refill on file per pharmacist     Writer placed a call to pharmacy and confirmed last refill was called in as a partial refill. Will route to provider for approval

## 2019-07-23 NOTE — TELEPHONE ENCOUNTER
Writer notified by provider the scripts for Suboxone were printed but not faxed. Provider requested this writer call in the both scripts to pharmacy. Placed a call to [facility] pharmacy and called in the below medications to pharmacist Tammi. Patient notified of the refill.     buprenorphine-naloxone (SUBOXONE) 8-2 MG SUBL sublingual tablet 60 tablet 0 7/23/2019  No   Sig - Route: Place 1 tablet under the tongue 2 times daily - Sublingual   Class: Local Print   Order: [id number]     buprenorphine-naloxone (SUBOXONE) 2-0.5 MG SUBL sublingual tablet 60 tablet 0 7/23/2019  No   Sig: Take 2 tabs (4 mg) under the tongue before lunch   Class: Local Print   Order: [id number]     No further action needed by this writer

## 2019-07-23 NOTE — TELEPHONE ENCOUNTER
Last seen: 7/9/19  RTC: 4 weeks   Cancel: none   No-show: none   Next appt: 7/30/19    Incoming refill from patient via phone     Medication requested: QUEtiapine (SEROQUEL) 25 MG tablet  Directions: Take 1-3 tablets (25-75 mg) by mouth nightly as needed (agitation) - Oral  Qty: 75  Last refilled: 6/18/18    Post reviewing the chart, patient should have refills on file. Confirmed with the pharmacy. Patient notified.

## 2019-07-23 NOTE — TELEPHONE ENCOUNTER
[facility] Call Center    Phone Message    May a detailed message be left on voicemail: yes    Reason for Call: Medication Refill Request    Has the patient contacted the pharmacy for the refill? Yes   Name of medication being requested: quetiapine 25mg  Provider who prescribed the medication: Noah Pandey MD  Pharmacy: [facility] [address]  Date medication is needed: ASAP - pt is running low and hoping to  prescription at the same time as picking up suboxone      Action Taken: Message routed to:  Other: Nursing pool

## 2019-07-30 ENCOUNTER — OFFICE VISIT (OUTPATIENT)
Dept: PSYCHIATRY | Facility: CLINIC | Age: 24
End: 2019-07-30
Attending: PSYCHIATRY & NEUROLOGY
Payer: COMMERCIAL

## 2019-07-30 DIAGNOSIS — F41.9 ANXIETY: ICD-10-CM

## 2019-07-30 DIAGNOSIS — F10.239 ALCOHOL DEPENDENCE WITH WITHDRAWAL WITH COMPLICATION (H): ICD-10-CM

## 2019-07-30 DIAGNOSIS — Z51.81 ENCOUNTER FOR THERAPEUTIC DRUG MONITORING: ICD-10-CM

## 2019-07-30 DIAGNOSIS — F11.20 OPIOID USE DISORDER, SEVERE, ON MAINTENANCE THERAPY (H): Primary | ICD-10-CM

## 2019-07-30 DIAGNOSIS — F11.20 UNCOMPLICATED OPIOID DEPENDENCE (H): ICD-10-CM

## 2019-07-30 DIAGNOSIS — F11.20 OPIOID USE DISORDER, SEVERE, DEPENDENCE (H): ICD-10-CM

## 2019-07-30 RX ORDER — QUETIAPINE FUMARATE 25 MG/1
25-75 TABLET, FILM COATED ORAL
Qty: 75 TABLET | Refills: 1 | Status: SHIPPED | OUTPATIENT
Start: 2019-07-30 | End: 2019-09-26

## 2019-07-30 RX ORDER — CLONIDINE 0.1 MG/24H
1 PATCH, EXTENDED RELEASE TRANSDERMAL WEEKLY
Qty: 4 PATCH | Refills: 1 | Status: CANCELLED | OUTPATIENT
Start: 2019-07-30

## 2019-07-30 RX ORDER — ESCITALOPRAM OXALATE 20 MG/1
20 TABLET ORAL DAILY
Qty: 30 TABLET | Refills: 1 | Status: SHIPPED | OUTPATIENT
Start: 2019-07-30 | End: 2019-10-01

## 2019-07-30 RX ORDER — BUPRENORPHINE HYDROCHLORIDE AND NALOXONE HYDROCHLORIDE DIHYDRATE 8; 2 MG/1; MG/1
1 TABLET SUBLINGUAL 2 TIMES DAILY
Qty: 60 TABLET | Refills: 0 | Status: CANCELLED | OUTPATIENT
Start: 2019-07-30

## 2019-07-30 RX ORDER — BUPRENORPHINE HYDROCHLORIDE AND NALOXONE HYDROCHLORIDE DIHYDRATE 2; .5 MG/1; MG/1
TABLET SUBLINGUAL
Qty: 60 TABLET | Refills: 0 | Status: CANCELLED | OUTPATIENT
Start: 2019-07-30

## 2019-07-30 RX ORDER — BUPRENORPHINE AND NALOXONE 4; 1 MG/1; MG/1
1 FILM, SOLUBLE BUCCAL; SUBLINGUAL
Qty: 28 EACH | Refills: 0 | Status: CANCELLED | OUTPATIENT
Start: 2019-07-30

## 2019-07-30 RX ORDER — CLONIDINE HYDROCHLORIDE 0.1 MG/1
0.1 TABLET ORAL 3 TIMES DAILY
Qty: 90 TABLET | Refills: 1 | Status: SHIPPED | OUTPATIENT
Start: 2019-07-30 | End: 2019-09-23

## 2019-07-30 ASSESSMENT — PAIN SCALES - GENERAL: PAINLEVEL: NO PAIN (0)

## 2019-07-30 NOTE — PATIENT INSTRUCTIONS
Thank you for coming to the PSYCHIATRY CLINIC.    Lab Testing:  If you had lab testing today and your results are reassuring or normal they will be mailed to you or sent through [facility] within 7 days.   If the lab tests need quick action we will call you with the results.  The phone number we will call with results is # [phone] (home) . If this is not the best number please call our clinic and change the number.    Medication Refills:  If you need any refills please call your pharmacy and they will contact us. Our fax number for refills is [phone]. Please allow three business for refill processing.   If you need to  your refill at a new pharmacy, please contact the new pharmacy directly. The new pharmacy will help you get your medications transferred.     Scheduling:  If you have any concerns about today's visit or wish to schedule another appointment please call our office during normal business hours [phone] (8-5:00 M-F)    Contact Us:  Please call [phone] during business hours (8-5:00 M-F).  If after clinic hours, or on the weekend, please call  [phone].    Financial Assistance [phone]  [facility] Billing [phone]  Central Billing Office, [facility]: [phone]  [facility] Billing [phone]  Medical Records [phone]      MENTAL HEALTH CRISIS NUMBERS:  [facility]:   [facility] - [phone]   Crisis Residence [facility] - [phone]   Walk-In Counseling [facility] [phone]   COPE 24/7 [place] Mobile Team for Adults - [[phone]]; Child - [[phone]]        [facility]:   [facility] - [phone]   Walk-in counseling [facility] - [phone]   Walk-in counseling [facility] - [phone]   Crisis Residence [facility] - [phone]   Urgent Care Adult Mental Health:   --Drop-in, 24/7 crisis line, and [place] Mobile Team  [[phone]]    CRISIS TEXT LINE: Text [phone] from anywhere, anytime, any crisis 24/7;    OR SEE www.crisistextline.org     Poison Control Center - [phone]    CHILD: [facility] needs assessment team - [phone]     [facility] - [phone]; or [facility] Lifeline - [phone]    If you have a medical emergency please call 911or go to the nearest ER.                    _____________________________________________    Again thank you for choosing PSYCHIATRY CLINIC and please let us know how we can best partner with you to improve you and your family's health.  You may be receiving a survey in the mail regarding this appointment. We would love to have your feedback, both positive and negative, so please fill out the survey and return it using the provided envelope. The survey is done by an external company, so your answers are anonymous.

## 2019-08-08 ENCOUNTER — OFFICE VISIT (OUTPATIENT)
Dept: PSYCHIATRY | Facility: CLINIC | Age: 24
End: 2019-08-08
Attending: PSYCHOLOGIST
Payer: COMMERCIAL

## 2019-08-08 DIAGNOSIS — F11.20 OPIOID USE DISORDER, SEVERE, ON MAINTENANCE THERAPY (H): Primary | ICD-10-CM

## 2019-08-08 DIAGNOSIS — F10.239 ALCOHOL DEPENDENCE WITH WITHDRAWAL WITH COMPLICATION (H): ICD-10-CM

## 2019-08-08 DIAGNOSIS — F41.9 ANXIETY: ICD-10-CM
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
## 2019-08-22 ENCOUNTER — TELEPHONE (OUTPATIENT)
Dept: PSYCHIATRY | Facility: CLINIC | Age: 24
End: 2019-08-22

## 2019-08-22 DIAGNOSIS — F11.20 OPIOID USE DISORDER, SEVERE, DEPENDENCE (H): ICD-10-CM

## 2019-08-22 RX ORDER — BUPRENORPHINE HYDROCHLORIDE AND NALOXONE HYDROCHLORIDE DIHYDRATE 8; 2 MG/1; MG/1
1 TABLET SUBLINGUAL 3 TIMES DAILY
Qty: 45 TABLET | Refills: 0 | Status: SHIPPED | OUTPATIENT
Start: 2019-08-22 | End: 2019-09-03

## 2019-08-22 RX ORDER — BUPRENORPHINE HYDROCHLORIDE AND NALOXONE HYDROCHLORIDE DIHYDRATE 2; .5 MG/1; MG/1
TABLET SUBLINGUAL
Qty: 30 TABLET | Refills: 0 | Status: SHIPPED | OUTPATIENT
Start: 2019-08-22 | End: 2019-08-22 | Stop reason: DRUGHIGH

## 2019-08-22 RX ORDER — BUPRENORPHINE HYDROCHLORIDE AND NALOXONE HYDROCHLORIDE DIHYDRATE 8; 2 MG/1; MG/1
1 TABLET SUBLINGUAL 2 TIMES DAILY
Qty: 30 TABLET | Refills: 0 | Status: SHIPPED | OUTPATIENT
Start: 2019-08-22 | End: 2019-08-22

## 2019-08-22 NOTE — TELEPHONE ENCOUNTER
Last seen: 7/30/19  RTC: 4 weeks   Cancel: 8/20/19  No-show: none   Next appt: none     Incoming refill from patient via phone     Medication requested: buprenorphine-naloxone (SUBOXONE) 8-2 MG SUBL sublingual tablet  Directions: Place 1 tablet under the tongue 2 times daily - Sublingual  Qty: 60  Last refilled: 7/23/19, 7/12/19, 6/18/19    Medication requested: buprenorphine-naloxone (SUBOXONE) 2-0.5 MG SUBL sublingual tablet  Directions: Take 2 tabs (4 mg) under the tongue before lunch  Qty: 60  Last refilled: 7/23/19, 7/8/19, 6/30/19    Will route to provider for approval     Placed a call to patient at [phone] to gather current dose of Suboxone. No answer at number provided. LVM, requesting a call back. Clinic number provided.

## 2019-08-22 NOTE — TELEPHONE ENCOUNTER
[facility] Call Center    Phone Message    May a detailed message be left on voicemail: yes    Reason for Call: Medication Refill Request    Has the patient contacted the pharmacy for the refill? Yes   Name of medication being requested: Suboxone  Provider who prescribed the medication:   Pharmacy: N/A Picks up at  Pharmacy  Date medication is needed: Has one tablet for the afternoon dose today, but he's suppose to take it 3 times a day.          Action Taken: Other: [facility] Psychiatry Pool

## 2019-08-22 NOTE — TELEPHONE ENCOUNTER
Placed a call to pharmacy who notified this writer Suboxone 8-2 mg tab are covered under the insurance.     No further action needed by this writer

## 2019-08-22 NOTE — TELEPHONE ENCOUNTER
[facility] Call [facility]    Phone Message    May a detailed message be left on voicemail: yes    Reason for Call: Medication Question or concern regarding medication   Prescription Clarification  Name of Medication: Generic Suboxone  Prescribing Provider:    [facility]acy: [facility]   What on the order needs clarification? The dosage of the specific medication isn't covered under ins, and they want to know if he want to get a PA or go with the film for the medication.          Action Taken: Other: [facility] Psych Pool

## 2019-08-22 NOTE — TELEPHONE ENCOUNTER
Refill Request (Suboxone )      Noah Pandey MD  You 27 minutes ago (2:53 PM)      Yes, the new order encounter has the 2 mg cancelled, and the 8 mg now tid #45.    Routing comment
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
## 2019-09-03 ENCOUNTER — OFFICE VISIT (OUTPATIENT)
Dept: PSYCHIATRY | Facility: CLINIC | Age: 24
End: 2019-09-03
Attending: PSYCHOLOGIST
Payer: COMMERCIAL

## 2019-09-03 ENCOUNTER — OFFICE VISIT (OUTPATIENT)
Dept: PSYCHIATRY | Facility: CLINIC | Age: 24
End: 2019-09-03
Attending: PSYCHIATRY & NEUROLOGY
Payer: COMMERCIAL

## 2019-09-03 VITALS
BODY MASS INDEX: 23.23 KG/M2 | SYSTOLIC BLOOD PRESSURE: 144 MMHG | WEIGHT: 145.4 LBS | HEART RATE: 91 BPM | DIASTOLIC BLOOD PRESSURE: 79 MMHG

## 2019-09-03 DIAGNOSIS — F41.9 ANXIETY: Primary | ICD-10-CM

## 2019-09-03 DIAGNOSIS — F10.239 ALCOHOL DEPENDENCE WITH WITHDRAWAL WITH COMPLICATION (H): ICD-10-CM

## 2019-09-03 DIAGNOSIS — F11.20 OPIOID USE DISORDER, SEVERE, ON MAINTENANCE THERAPY (H): ICD-10-CM

## 2019-09-03 DIAGNOSIS — F11.20 OPIOID USE DISORDER, SEVERE, DEPENDENCE (H): ICD-10-CM

## 2019-09-03 LAB
AMPHETAMINES UR QL SCN: NEGATIVE
BARBITURATES UR QL: NEGATIVE
BENZODIAZ UR QL: NEGATIVE
CANNABINOIDS UR QL SCN: NEGATIVE
COCAINE UR QL: NEGATIVE
ETHANOL UR QL SCN: NEGATIVE
OPIATES UR QL SCN: NEGATIVE
PCP UR QL SCN: NEGATIVE

## 2019-09-03 PROCEDURE — 80320 DRUG SCREEN QUANTALCOHOLS: CPT | Performed by: PSYCHIATRY & NEUROLOGY

## 2019-09-03 PROCEDURE — 80307 DRUG TEST PRSMV CHEM ANLYZR: CPT | Performed by: PSYCHIATRY & NEUROLOGY

## 2019-09-03 PROCEDURE — G0463 HOSPITAL OUTPT CLINIC VISIT: HCPCS | Mod: ZF

## 2019-09-03 PROCEDURE — 80299 QUANTITATIVE ASSAY DRUG: CPT | Performed by: PSYCHIATRY & NEUROLOGY

## 2019-09-03 RX ORDER — BUPRENORPHINE HYDROCHLORIDE AND NALOXONE HYDROCHLORIDE DIHYDRATE 8; 2 MG/1; MG/1
1 TABLET SUBLINGUAL 3 TIMES DAILY
Qty: 90 TABLET | Refills: 0 | Status: SHIPPED | OUTPATIENT
Start: 2019-09-03 | End: 2019-10-01

## 2019-09-03 ASSESSMENT — PAIN SCALES - GENERAL: PAINLEVEL: NO PAIN (0)
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
## 2019-09-03 NOTE — PROGRESS NOTES
"Department of Psychiatry  Individual Psychotherapy Progress Note    Date of Service: 9/3/2019 Start Time: 2:00 Stop Time: 3:00  Care Provider: Ignacia Hamilton MA, LAMFT  Supervisor: Tanja Crawford LP, LMFT    Date of Last Visit: 8/8/2019    SUBJECTIVE/OBJECTIVE:     Patient Report since last meeting/How does the patient think they are doing?: \"I'm good!  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  Patient was on time to this appointment    Mental Status Exam:  Appearance:  Casually dressed and Well groomed  Behavior/relationship to examiner/demeanor:  Cooperative, Engaged and Pleasant  Gait:  Normal  Speech rate:  Normal  Speech volume:  Normal  Speech coherence:  Normal  Speech spontaneity:  Normal  Mood (subjective report):  \"good\", pleasant, sad and anxious  Affect (objective appearance):  Appropriate/mood-congruent and Anxious/Nervous  Thought Process (Associations):  Logical, Linear and Goal directed  Thought process (Rate):  Normal  Abnormal Perception:  None  Attention/Concentration:  Normal  Insight:  Good  Judgment:  Good    ASSESSMENT/SUMMARY:    Patient is a 24 year old year old Male presenting for a follow-up visit due to symptoms of:   1. Anxiety    2. Opioid use disorder, severe, on maintenance therapy (H)    3. Alcohol dependence with withdrawal with complication (H)        Enzo shared stories about his family members' recent experiences with alcohol use and reflected on ways their substance use affects his own feelings and recovery process.  He reported that he has been taking care of his parents \"for as long as I can remember\".  He also reported that his mom's  recently attempted to die by suicide via carbon monoxide poisoning, but that his mom found him and that he was currently hospitalized and safe.  Enzo participated in an [employer] " card activity in which he explored visual representations of various parts of his life.  Enzo showed high levels of insight and vulnerability in completing this activity and expressed strong interest and engagement in the activity.  Patient denied SI, plan and/or means.    Progress toward short-term goals: Being only our second meeting, session focused primarily on continued assessment and joining.  Enzo appears to be very engaged and willing to participate in the therapeutic process. Enzo's extensive history of substance use, depression, and anxiety, as well as a history of trauma indicates that continued 1:1 therapy will be helpful in addressing his treatment goals through teaching the relationship between language, thoughts, feelings, and behaviors, and how these impact his symptoms.    Patient was able to participate and benefit from treatment as evidenced by his verbal expression and understanding of ideas discussed.  Patient continues to be motivated for treatment.      PLAN:    Follow-up: Continue 1:1 individual therapy using a blend of narrative, solution-focused, and CBT modalities. Patient was not assigned homework to complete for the following session.     Review of long term goals: The patient s treatment plan will be completed in next session.      Performed and documented by: VIKASH Morin  Supervising Clinician: Tanja Crawford LP, LMFT    I was not present for the session. I reviewed the case and the note and I agree with the plan. Tanja Crawford, PHD, LP

## 2019-09-05 LAB
BUPRENORPHINE GLUCURONIDE URINE: 273 NG/ML
BUPRENORPHINE UR CFM-MCNC: 7 NG/ML
NALOXONE URINE: <100 NG/ML
NORBUPRENORPHINE GLUCURONIDE URINE: 417 NG/ML
NORBUPRENORPHINE UR CFM-MCNC: 108 NG/ML

## 2019-09-10 ENCOUNTER — OFFICE VISIT (OUTPATIENT)
Dept: PSYCHIATRY | Facility: CLINIC | Age: 24
End: 2019-09-10
Attending: PSYCHOLOGIST
Payer: COMMERCIAL

## 2019-09-10 DIAGNOSIS — F11.20 OPIOID USE DISORDER, SEVERE, ON MAINTENANCE THERAPY (H): Primary | ICD-10-CM

## 2019-09-10 DIAGNOSIS — F41.9 ANXIETY: ICD-10-CM

## 2019-09-10 NOTE — PROGRESS NOTES
"Department of Psychiatry  Individual Psychotherapy Progress Note    Date of Service: 9/10/2019 Start Time: 2:20 Stop Time: 3:05  Care Provider: Ignacia Hamilton MA, LAMFT  Supervisor: Tanja Crawford LP, LMFT    Date of Last Visit: 9/10/2019    SUBJECTIVE/OBJECTIVE:     Patient Report since last meeting/How does the patient think they are doing?: \"I'm pretty good.  I'm margy stressed because I'm starting a new job and I missed the drug test for it...\"    Patient was seen today for a 45 minute individual psychotherapy session held at the [facility].  Patient was late by 20 min to this appointment    Mental Status Exam:  Appearance:  Casually dressed and Well groomed  Behavior/relationship to examiner/demeanor:  Cooperative, Engaged and Pleasant  Gait:  Normal  Speech rate:  Normal  Speech volume:  Normal  Speech coherence:  Normal  Speech spontaneity:  Normal  Mood (subjective report):  \"okay\" and anxious  Affect (objective appearance):  Appropriate/mood-congruent and Anxious/Nervous  Thought Process (Associations):  Logical, Linear and Goal directed  Thought process (Rate):  Normal  Abnormal Perception:  None  Attention/Concentration:  Normal  Insight:  Good  Judgment:  Good    ASSESSMENT/SUMMARY:    Patient is a 24 year old year old Male presenting for a follow-up visit due to symptoms of:   1. Opioid use disorder, severe, on maintenance therapy (H)    2. Anxiety        Enzo discussed feelings surrounding taking a drug test for his new job; he noted that he feels confident in taking the drug test and was upfront that the test will show suboxone in his system.  He stated that he isn't \"really worried about it\" but it's something he thinks about. He discussed his \"not enough\"story, noting that most of his feelings of being not enough are rooted in physical appearance, such as not in shape enough or not good looking enough.  Enzo also explored concept of balancing his family's need with the " "needs of his recovery journey.  Patient denied SI, plan and/or means.    Progress toward short-term goals: Enzo appears to be very engaged and willing to participate in the therapeutic process.  He seemed somewhat resistant to explore his not enough story, so that concept will be re-introduced at a later session.  Enzo's extensive history of substance use, depression, and anxiety, as well as a history of trauma indicates that continued 1:1 therapy will be helpful in addressing his treatment goals through teaching the relationship between language, thoughts, feelings, and behaviors, and how these impact his symptoms.     Patient was able to participate and benefit from treatment as evidenced by his verbal expression and understanding of ideas discussed.  Patient continues to be motivated for treatment.       PLAN:    Follow-up: Continue 1:1 individual therapy using a blend of narrative, solution-focused, and CBT modalities. Patient was assigned consideration of ways he feels like \"not enough\"  to complete for the following session.     Review of long term goals: The patient s treatment plan was completed in todays session and will be reviewed on 12/10/19      Performed and documented by: VIKASH Morin  Supervising Clinician: Tanja Crawford LP, LMFT    I was not present for the session. I reviewed the case and the note and I agree with the plan. Tanja Crawford, PHD, LP  "

## 2019-09-23 DIAGNOSIS — F11.20 OPIOID USE DISORDER, SEVERE, ON MAINTENANCE THERAPY (H): ICD-10-CM

## 2019-09-23 DIAGNOSIS — F41.9 ANXIETY: ICD-10-CM

## 2019-09-23 RX ORDER — CLONIDINE HYDROCHLORIDE 0.1 MG/1
0.1 TABLET ORAL 3 TIMES DAILY
Qty: 90 TABLET | Refills: 0 | Status: SHIPPED | OUTPATIENT
Start: 2019-09-23 | End: 2019-10-10

## 2019-09-23 NOTE — TELEPHONE ENCOUNTER
[facility] Call Center    Phone Message    May a detailed message be left on voicemail: yes    Reason for Call: Medication Refill Request    Has the patient contacted the pharmacy for the refill? Yes   Name of medication being requested: clonodine 0.1mg  Provider who prescribed the medication: Noah Pandey MD  Pharmacy: [facility] [address]  Date medication is needed: ASAP    Patient said he was cleaning his room and thinks he threw away his bottle of medication. He can be reached at [phone].      Action Taken: Message routed to:  Other: Nursing pool

## 2019-09-23 NOTE — TELEPHONE ENCOUNTER
Patient called to check for update on the clonodine refill. Writer was unable to reach the RN and informed the patient a follow up message would be sent.

## 2019-09-23 NOTE — TELEPHONE ENCOUNTER
Writer called pt to gather more information. He confirmed that he accidentally threw away the medication while cleaning his room. He has not taken any of the doses today and is requesting a lost medication override.     Called pharmacy and confirmed that med was last filled on 9/4. They are aware of the situation and will try to complete a lost medication override.     Last seen: 9/3/19  RTC: 4 weeks   Cancel: none  No-show: none  Next appt: 10/1/19

## 2019-09-24 NOTE — TELEPHONE ENCOUNTER
Writer called [facility] pharmacy to receive update. They have not called insurance for a lost med override and will try this morning. Will f/up in a few hours.

## 2019-09-24 NOTE — TELEPHONE ENCOUNTER
Called [facility] pharmacy. Staff called insurance and confirmed that pt can do the lost medication override or they can fill the medication in approximately 3 days. Agreed to discuss this with the pt.    Called pt. He would prefer to do the lost med override today. He will call pharmacy directly and request this. Asked that he call this clinic if he runs into additional issues, which he agreed with.

## 2019-09-25 RX ORDER — CLONIDINE HYDROCHLORIDE 0.1 MG/1
TABLET ORAL
Qty: 90 TABLET | Refills: 1 | OUTPATIENT
Start: 2019-09-25

## 2019-09-26 DIAGNOSIS — F11.20 UNCOMPLICATED OPIOID DEPENDENCE (H): ICD-10-CM

## 2019-09-26 RX ORDER — QUETIAPINE FUMARATE 25 MG/1
25-75 TABLET, FILM COATED ORAL
Qty: 75 TABLET | Refills: 1 | Status: SHIPPED | OUTPATIENT
Start: 2019-09-26 | End: 2019-10-01 | Stop reason: DRUGHIGH

## 2019-09-26 NOTE — TELEPHONE ENCOUNTER
Meds refilled by provider   Med tab changed to reflect this   Patient notified     No further action needed by this writer

## 2019-09-26 NOTE — TELEPHONE ENCOUNTER
[facility] Call Center    Phone Message    May a detailed message be left on voicemail: yes    Reason for Call: Medication Refill Request    Has the patient contacted the pharmacy for the refill? Yes   Name of medication being requested: Quetiapine  Provider who prescribed the medication:   Pharmacy: [facility]  Date medication is needed: ASAP. He took his last one last night.          Action Taken: Other: [facility] Pool

## 2019-09-26 NOTE — TELEPHONE ENCOUNTER
Last seen: 9/3/19  RTC: 4 weeks   Cancel: 9/17/19  No-show: none   Next appt: 10/1/19    Incoming refill from patient via phone     Medication requested: QUEtiapine (SEROQUEL) 25 MG tablet  Directions: Take 1-3 tablets (25-75 mg) by mouth nightly as needed (agitation) - Oral  Qty: 75  Last refilled: 7/30/19    Will route to provider to confirm the dose of Seroquel due to this writer unable to locate this at the last office visit.

## 2019-10-01 ENCOUNTER — OFFICE VISIT (OUTPATIENT)
Dept: PSYCHIATRY | Facility: CLINIC | Age: 24
End: 2019-10-01
Attending: PSYCHOLOGIST
Payer: COMMERCIAL

## 2019-10-01 ENCOUNTER — OFFICE VISIT (OUTPATIENT)
Dept: PSYCHIATRY | Facility: CLINIC | Age: 24
End: 2019-10-01
Attending: PSYCHIATRY & NEUROLOGY
Payer: COMMERCIAL

## 2019-10-01 VITALS
SYSTOLIC BLOOD PRESSURE: 114 MMHG | WEIGHT: 152.6 LBS | DIASTOLIC BLOOD PRESSURE: 70 MMHG | BODY MASS INDEX: 24.38 KG/M2 | HEART RATE: 58 BPM

## 2019-10-01 DIAGNOSIS — F11.20 OPIOID USE DISORDER, SEVERE, DEPENDENCE (H): ICD-10-CM

## 2019-10-01 DIAGNOSIS — F11.20 OPIOID USE DISORDER, SEVERE, ON MAINTENANCE THERAPY (H): Primary | ICD-10-CM

## 2019-10-01 DIAGNOSIS — F11.20 OPIOID USE DISORDER, SEVERE, ON MAINTENANCE THERAPY (H): ICD-10-CM

## 2019-10-01 DIAGNOSIS — F10.239 ALCOHOL DEPENDENCE WITH WITHDRAWAL WITH COMPLICATION (H): ICD-10-CM

## 2019-10-01 DIAGNOSIS — F41.9 ANXIETY: ICD-10-CM

## 2019-10-01 DIAGNOSIS — F11.20 UNCOMPLICATED OPIOID DEPENDENCE (H): ICD-10-CM

## 2019-10-01 DIAGNOSIS — F43.10 PTSD (POST-TRAUMATIC STRESS DISORDER): Primary | ICD-10-CM

## 2019-10-01 PROCEDURE — G0463 HOSPITAL OUTPT CLINIC VISIT: HCPCS | Mod: ZF

## 2019-10-01 RX ORDER — ESCITALOPRAM OXALATE 20 MG/1
20 TABLET ORAL DAILY
Qty: 30 TABLET | Refills: 1 | Status: SHIPPED | OUTPATIENT
Start: 2019-10-01 | End: 2019-12-05

## 2019-10-01 RX ORDER — CLONIDINE HYDROCHLORIDE 0.1 MG/1
0.1 TABLET ORAL 3 TIMES DAILY
Qty: 90 TABLET | Refills: 0 | Status: CANCELLED | OUTPATIENT
Start: 2019-10-01

## 2019-10-01 RX ORDER — QUETIAPINE FUMARATE 100 MG/1
100 TABLET, FILM COATED ORAL DAILY
Qty: 30 TABLET | Refills: 1 | Status: SHIPPED | OUTPATIENT
Start: 2019-10-01 | End: 2019-11-14

## 2019-10-01 RX ORDER — BUPRENORPHINE HYDROCHLORIDE AND NALOXONE HYDROCHLORIDE DIHYDRATE 8; 2 MG/1; MG/1
1 TABLET SUBLINGUAL 3 TIMES DAILY
Qty: 90 TABLET | Refills: 0 | Status: SHIPPED | OUTPATIENT
Start: 2019-10-01 | End: 2019-11-04

## 2019-10-01 RX ORDER — PRAZOSIN HYDROCHLORIDE 1 MG/1
CAPSULE ORAL
Qty: 60 CAPSULE | Refills: 0 | Status: SHIPPED | OUTPATIENT
Start: 2019-10-01 | End: 2019-11-04

## 2019-10-01 ASSESSMENT — PAIN SCALES - GENERAL: PAINLEVEL: NO PAIN (0)
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
## 2019-10-01 NOTE — NURSING NOTE
Chief Complaint   Patient presents with     Recheck Medication     Opioid use disorder, severe, dependence

## 2019-10-01 NOTE — PROGRESS NOTES
"Department of Psychiatry  Individual Psychotherapy Progress Note    Date of Service: 10/1/2019 Start Time: 2:05 Stop Time: 3:00  Care Provider: Ignacia Hamilton MA, LAMFT  Supervisor: Tanja Crawford LP, LMFT    Date of Last Visit: 9/10/2019    SUBJECTIVE/OBJECTIVE:     Patient Report since last meeting/How does the patient think they are doing?: \"I'm OK - I haven't been sleeping so I'm totally sleep deprived'    Patient was seen today for a 55 minute individual psychotherapy session held at the [facility].  Patient was on time to this appointment    Mental Status Exam:  Appearance:  Casually dressed and Adequately groomed  Behavior/relationship to examiner/demeanor:  Cooperative, Engaged, Pleasant and Reduced eye contact  Gait:  Normal  Speech rate:  Normal  Speech volume:  Normal  Speech coherence:  Normal  Speech spontaneity:  Normal  Mood (subjective report):  \"okay\", calm and flat  Affect (objective appearance):  Appropriate/mood-congruent, Subdued and Blunted/Flat  Thought Process (Associations):  Logical and Linear  Thought process (Rate):  Normal and Slowed  Abnormal Perception:  None  Attention/Concentration:  Fair and Poor  Insight:  Adequate  Judgment:  Fair    ASSESSMENT/SUMMARY:    Patient is a 24 year old year old Male presenting for a follow-up visit due to symptoms of:   1. Opioid use disorder, severe, on maintenance therapy (H)    2. Anxiety        Enzo described his recent struggle to sleep, reporting that he is currently getting only 2-4 hours of sleep per night de to recurrent nightmares and dreams of using drugs and/or overdosing.  He denied experiencing urges or cravings, though he reported missing the deep slumber that followed substance use.  Enzo explored his family history pertaining to substance use, noting that his mother, her father, siblings and grandparents all experienced Alcohol use disorder, whereas his father siblings experienced various substance use " disorders, though his dad did not.  Enzo briefly discussed the role of family secrets, which will be processed further in the next session.  Patient denied SI, plan and/or means.    Progress toward short-term goals:  Enzo reports to be very engaged and willing to participate in the therapeutic process, though he presented as extremely tired and somewhat disengaged during today's session. Enzo's extensive history of substance use, depression, and anxiety, as well as a history of trauma indicates that continued 1:1 therapy will be helpful in addressing his treatment goals through teaching the relationship between language, thoughts, feelings, and behaviors, and how these impact his symptoms.     Patient was able to participate and benefit from treatment as evidenced by his verbal expression and understanding of ideas discussed.  Patient continues to be motivated for treatment.        PLAN:    Follow-up: Continue 1:1 individual therapy using a blend of narrative, solution-focused, and CBT modalities. Patient was assigned consideration of family secrets to complete for the following session.     Review of long term goals: The patient s treatment plan was last updated on: 9/10/19 and will be reviewed on 12/10/19      Performed and documented by: VIKASH Morin  Supervising Clinician: Tanja Crawford LP, LMFT  I was not present for the session. I reviewed the case and the note and I agree with the plan. Tanja Crawford, PHD, LP

## 2019-10-08 ENCOUNTER — OFFICE VISIT (OUTPATIENT)
Dept: PSYCHIATRY | Facility: CLINIC | Age: 24
End: 2019-10-08
Attending: PSYCHOLOGIST
Payer: COMMERCIAL

## 2019-10-08 DIAGNOSIS — F11.20 OPIOID USE DISORDER, SEVERE, ON MAINTENANCE THERAPY (H): Primary | ICD-10-CM

## 2019-10-08 DIAGNOSIS — F41.9 ANXIETY: ICD-10-CM
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
## 2019-10-09 ASSESSMENT — PATIENT HEALTH QUESTIONNAIRE - PHQ9: SUM OF ALL RESPONSES TO PHQ QUESTIONS 1-9: 6

## 2019-10-10 ENCOUNTER — OFFICE VISIT (OUTPATIENT)
Dept: PSYCHIATRY | Facility: CLINIC | Age: 24
End: 2019-10-10
Attending: PSYCHIATRY & NEUROLOGY
Payer: COMMERCIAL

## 2019-10-10 VITALS
HEART RATE: 84 BPM | BODY MASS INDEX: 24.25 KG/M2 | DIASTOLIC BLOOD PRESSURE: 95 MMHG | SYSTOLIC BLOOD PRESSURE: 145 MMHG | WEIGHT: 151.8 LBS

## 2019-10-10 DIAGNOSIS — F41.9 ANXIETY: ICD-10-CM

## 2019-10-10 DIAGNOSIS — F11.20 OPIOID USE DISORDER, SEVERE, ON MAINTENANCE THERAPY (H): Primary | ICD-10-CM

## 2019-10-10 DIAGNOSIS — F43.10 PTSD (POST-TRAUMATIC STRESS DISORDER): ICD-10-CM

## 2019-10-10 DIAGNOSIS — Z51.81 ENCOUNTER FOR THERAPEUTIC DRUG MONITORING: ICD-10-CM

## 2019-10-10 PROCEDURE — G0463 HOSPITAL OUTPT CLINIC VISIT: HCPCS | Mod: ZF

## 2019-10-10 RX ORDER — CLONIDINE HYDROCHLORIDE 0.2 MG/1
0.2 TABLET ORAL 3 TIMES DAILY
Qty: 90 TABLET | Refills: 1 | Status: SHIPPED | OUTPATIENT
Start: 2019-10-10 | End: 2019-10-23

## 2019-10-10 ASSESSMENT — PAIN SCALES - GENERAL: PAINLEVEL: NO PAIN (0)
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
## 2019-10-11 ASSESSMENT — PATIENT HEALTH QUESTIONNAIRE - PHQ9: SUM OF ALL RESPONSES TO PHQ QUESTIONS 1-9: 6

## 2019-10-22 ENCOUNTER — OFFICE VISIT (OUTPATIENT)
Dept: PSYCHIATRY | Facility: CLINIC | Age: 24
End: 2019-10-22
Attending: PSYCHOLOGIST
Payer: COMMERCIAL

## 2019-10-22 DIAGNOSIS — F41.9 ANXIETY: ICD-10-CM

## 2019-10-22 DIAGNOSIS — F11.20 OPIOID USE DISORDER, SEVERE, ON MAINTENANCE THERAPY (H): Primary | ICD-10-CM

## 2019-10-22 NOTE — PROGRESS NOTES
"Department of Psychiatry  Individual Psychotherapy Progress Note    Date of Service: 10/22/2019 Start Time: 2:05 Stop Time: 3:00  Care Provider: Ignacia Hamilton MA, LAMFT  Supervisor: Tanja Crawford LP, LMFT    Date of Last Visit: 10/8/2019    SUBJECTIVE/OBJECTIVE:     Patient Report since last meeting/How does the patient think they are doing?: \"I've been pretty stressed out about a bunch of things.  Things have been nerve-wracking lately\"    Patient was seen today for a 55 minute individual psychotherapy session held at the [facility].  Patient was on time to this appointment    Mental Status Exam:  Appearance:  Casually dressed and Well groomed  Behavior/relationship to examiner/demeanor:  Cooperative, Engaged and Pleasant  Gait:  Normal  Speech rate:  Normal  Speech volume:  Normal  Speech coherence:  Normal  Speech spontaneity:  Normal  Mood (subjective report):  \"okay\", pleasant, overwhelmed and tense  Affect (objective appearance):  Appropriate/mood-congruent and Anxious/Nervous  Thought Process (Associations):  Logical, Linear and Goal directed  Thought process (Rate):  Normal  Abnormal Perception:  None  Attention/Concentration:  Normal  Insight:  Adequate  Judgment:  Fair    ASSESSMENT/SUMMARY:    Patient is a 24 year old year old Male presenting for a follow-up visit due to symptoms of:   1. Opioid use disorder, severe, on maintenance therapy (H)    2. Anxiety        Fidencio expressed multiple areas in which he has experienced recent stress, including running out of medication, potential inability to attend his next appointment with Dr Pandey, false positive urine screen, potential reprimand from , and insufficient social support.  Fidencio explored each of these areas and stated that the medication is \"the only way to reduce anxiety\"; he engaged in processing and challenging this statement and appeared to relax slightly by the end of the session.  He shared insight " "in his recognition that he is \"a work in progress\" and that he is working to be more vulnerable to both himself and others.  Patient denied SI, plan and/or means.    Progress toward short-term goals: Enzo appears to be very engaged and willing to participate in the therapeutic process, though he skipped last session without notification.  He reports compliance with medication and expresses a seemingly authentic desire to change.  He is reflective and contemplative during session.  Enzo's extensive history of substance use, depression, and anxiety, as well as a history of trauma indicates that continued 1:1 therapy will be helpful in addressing his treatment goals through teaching the relationship between language, thoughts, feelings, and behaviors, and how these impact his symptoms.     Patient was able to participate and benefit from treatment as evidenced by his verbal expression and understanding of ideas discussed. Patient continues to be motivated for treatment.        PLAN:     Follow-up: Continue 1:1 individual therapy using a blend of narrative, solution-focused, and CBT modalities. Patient was assigned awareness of emotions under anger to complete for the following session.      Review of long term goals: The patient s treatment plan was last updated on: 9/10/19 and will be reviewed on 12/10/19    Performed and documented by: VIKASH Morin  Supervising Clinician: Tanja Crawford LP, LMFT    I was not present for the session. I reviewed the case and the note and I agree with the plan. Tanja Crawford, PHD, LP  "

## 2019-10-23 ENCOUNTER — TELEPHONE (OUTPATIENT)
Dept: PSYCHIATRY | Facility: CLINIC | Age: 24
End: 2019-10-23

## 2019-10-23 DIAGNOSIS — F11.20 OPIOID USE DISORDER, SEVERE, ON MAINTENANCE THERAPY (H): ICD-10-CM

## 2019-10-23 DIAGNOSIS — F41.9 ANXIETY: ICD-10-CM

## 2019-10-23 RX ORDER — CLONIDINE HYDROCHLORIDE 0.3 MG/1
0.3 TABLET ORAL 3 TIMES DAILY
Qty: 90 TABLET | Refills: 0 | Status: SHIPPED | OUTPATIENT
Start: 2019-10-23 | End: 2019-11-06

## 2019-10-23 NOTE — TELEPHONE ENCOUNTER
Writer received incoming call from patient [phone] requesting to increase his Clonidine dose. He reports being seen by therapist who suggested an increase would be beneficial. He reports taking Clonidine 0.2 mg tab - 1.5 tab TID since the past week and mentioned that he is comfortable staying at the current dose. Denies experiencing panic attack and shared its helpful to manager anxiety. Patient was recommended to take 0.2 mg TID. He at this time is wanting to discuss the dose increase with the provider. Patient is willing to wait till the appt on 10/24/19 to discuss this further. Writer agreed to pass this along to provider.     Routed to provider

## 2019-10-24 NOTE — TELEPHONE ENCOUNTER
- Increase dose of Clonidine 0.3 mg TID approved and refilled by provider   - Med tab changed to reflect this   - Provider will discuss this with patient at the appt on 10/24/19. Placed a call to patient at [phone] to relay the above information. No answer at number provided. LVM, requesting a call back. Clinic number provided.

## 2019-11-04 DIAGNOSIS — F11.20 OPIOID USE DISORDER, SEVERE, DEPENDENCE (H): ICD-10-CM

## 2019-11-04 DIAGNOSIS — F43.10 PTSD (POST-TRAUMATIC STRESS DISORDER): ICD-10-CM

## 2019-11-04 RX ORDER — PRAZOSIN HYDROCHLORIDE 2 MG/1
CAPSULE ORAL
Qty: 30 CAPSULE | Refills: 0 | Status: SHIPPED | OUTPATIENT
Start: 2019-11-04 | End: 2019-11-14

## 2019-11-04 RX ORDER — BUPRENORPHINE HYDROCHLORIDE AND NALOXONE HYDROCHLORIDE DIHYDRATE 8; 2 MG/1; MG/1
1 TABLET SUBLINGUAL 3 TIMES DAILY
Qty: 90 TABLET | Refills: 0 | Status: SHIPPED | OUTPATIENT
Start: 2019-11-04 | End: 2019-12-05

## 2019-11-04 NOTE — TELEPHONE ENCOUNTER
[facility] Call Center    Phone Message    May a detailed message be left on voicemail: yes    Reason for Call: Medication Question or concern regarding medication   Prescription Clarification  Name of Medication: Prazosin 1 mg  Prescribing Provider: Noah Pandey   Pharmacy: [facility] [address]     What on the order needs clarification? Patient called to confirm that he is taking 2 capsules at bedtime for a total of 2 mg nightly.    Patient is in treatment today and can be reached on his break, starting at 11:15 AM, if there are further questions.      Action Taken: Message routed to:  Other: Karen Myers RNCC

## 2019-11-04 NOTE — ADDENDUM NOTE
Addended by: BUTCH HANDY on: 11/4/2019 10:40 AM     Modules accepted: Orders     Report given to Chuckie Acosta, RN  [id number] Acosta will transfer pt to [facility]

## 2019-11-04 NOTE — TELEPHONE ENCOUNTER
Last seen: 10/10/19  RTC: 4 weeks   Cancel: none   No-show: 10/24/19  Next appt: 11/21/19    Incoming refill from patient via phone     Medication requested: prazosin (MINIPRESS) 1 MG capsule  Directions: Take 1 mg nightly for 1 week, then take 2 mg nightly  Qty: 60  Last refilled: 10/1/19    Medication requested: buprenorphine-naloxone (SUBOXONE) 8-2 MG SUBL sublingual tablet  Directions: Place 1 tablet under the tongue 3 times daily - Sublingual  Qty: 90  Last refilled: 10/2/19 #90, 9/14/19 #90, 8/22/19 #45    Will route to covering provider for approval due to no show on appt     Writer placed a call to patient at [phone] to confirm the Prazosin dose. No answer at number provided. LVM, requesting a call back. Clinic number provided.

## 2019-11-04 NOTE — TELEPHONE ENCOUNTER
[facility] Call Center    Phone Message    May a detailed message be left on voicemail: yes    Reason for Call: Medication Refill Request    Has the patient contacted the pharmacy for the refill? Yes   Name of medication being requested: Prazosin and suboxone  Provider who prescribed the medication:   Pharmacy:  [facility]  Date medication is needed: ASAP. Pt is out of both. His next appt is on 11/21/19.          Action Taken: Other: [facility]

## 2019-11-04 NOTE — TELEPHONE ENCOUNTER
- Meds refilled by Dr. Elmore   - Suboxone script locally printed   - Writer located the printed script and placed in the shredder   - Called in Suboxone 8-2 mg #90 with 0 refills with pharmacist Kath at [place], [place] - [address]  - Patient notified     No further action needed by this writer

## 2019-11-05 ENCOUNTER — TELEPHONE (OUTPATIENT)
Dept: PSYCHIATRY | Facility: CLINIC | Age: 24
End: 2019-11-05

## 2019-11-05 ENCOUNTER — OFFICE VISIT (OUTPATIENT)
Dept: PSYCHIATRY | Facility: CLINIC | Age: 24
End: 2019-11-05
Attending: PSYCHOLOGIST
Payer: COMMERCIAL

## 2019-11-05 DIAGNOSIS — F43.10 PTSD (POST-TRAUMATIC STRESS DISORDER): ICD-10-CM

## 2019-11-05 DIAGNOSIS — F11.20 OPIOID USE DISORDER, SEVERE, ON MAINTENANCE THERAPY (H): Primary | ICD-10-CM

## 2019-11-05 DIAGNOSIS — F41.9 ANXIETY: ICD-10-CM

## 2019-11-05 DIAGNOSIS — F11.20 OPIOID USE DISORDER, SEVERE, ON MAINTENANCE THERAPY (H): ICD-10-CM

## 2019-11-05 ASSESSMENT — PATIENT HEALTH QUESTIONNAIRE - PHQ9: SUM OF ALL RESPONSES TO PHQ QUESTIONS 1-9: 3

## 2019-11-05 NOTE — TELEPHONE ENCOUNTER
Writer consulted with Dr. Ann and was recommended to reach out to pharmacy team to confirm if decrease in Clonidine 0.3 mg daily would cause rebound hypertension.     Routed to Pharmacy team

## 2019-11-05 NOTE — TELEPHONE ENCOUNTER
Klonodine Level   Received: Today   Message Contents   Alcira Kwong Radhika, RN   Phone Number: [phone]             Pt stopped by desk after his appointment with Ignacia to request that we help him switch his klonodine from . 3 back to . 2 because the . 3 is just too much for him. His callback number is [phone]      Writer placed a call to patient at [phone] to gather additional information. Patient is currently taking Clonidine 0.3 mg TID and is wanting to decrease the dose back to 0.2 mg TID. He is experiencing extremely tired, groggy, increase sedation and shared falling asleep at work. He denies any other concerning symptoms. Informed patient that provider is out of clinic till next week but agreed to pass this along to covering provider. Patient agreed with the plan.     Routed to covering provider Dr. Ann

## 2019-11-05 NOTE — PROGRESS NOTES
"Department of Psychiatry  Individual Psychotherapy Progress Note    Date of Service: 11/5/2019 Start Time: 2:05 Stop Time: 3:00  Care Provider: Ignacia Hamilton MA, LAMFT  Supervisor: Tanja Crawford LP, LMFT    Date of Last Visit: 10/22/2019    SUBJECTIVE/OBJECTIVE:     Patient Report since last meeting/How does the patient think they are doing?: \"I'm pretty good.  I've noticed my depression goind down, but this tiredness is really kicking my ass\"    Patient was seen today for a 55 minute individual psychotherapy session held at the [facility].  Patient was on time to this appointment    PHQ9 was completed; score = 3    Mental Status Exam:  Appearance:  Casually dressed and Well groomed  Behavior/relationship to examiner/demeanor:  Cooperative, Engaged and Pleasant  Gait:  Normal  Speech rate:  Normal  Speech volume:  Normal  Speech coherence:  Normal  Speech spontaneity:  Normal  Mood (subjective report):  \"okay\", pleasant, dysphoric and tired  Affect (objective appearance):  Appropriate/mood-congruent  Thought Process (Associations):  Logical, Linear and Goal directed  Thought process (Rate):  Normal and Slowed  Abnormal Perception:  None  Attention/Concentration:  Fair  Insight:  Adequate  Judgment:  Fair    ASSESSMENT/SUMMARY:    Patient is a 24 year old year old Male presenting for a follow-up visit due to symptoms of:   1. Opioid use disorder, severe, on maintenance therapy (H)    2. Anxiety    3. PTSD (post-traumatic stress disorder)        Fidencio discussed his recent symptoms of depression, noting that he has been feeling really good for the most part, though levels of fatigue have increased significantly.  Fidencio attributed the fatigue to the increased dosage of prazosin and plans to discuss it with Dr Pandey.  He explored his experiences with Chad, noting that he has appreciated the treatment process and works hard to be present at each meeting and integrate the information, " despite the difficulty. Fidencio explored the concept of vulnerability, including his own experience with it, how it varies depending on the environment, the potential outcomes of vulnerability, and how he experienced vulnerability growing up.   Patient denied SI, plan and/or means.    Progress toward short-term goals: Enzo appears to be very engaged and willing to participate in the therapeutic process; requested to move sessions to every other week to accommodate his work schedule.  He reports compliance with medication and expresses a seemingly authentic desire to change.  He continues to be reflective and contemplative during session.  Enzo's extensive history of substance use, depression, and anxiety, as well as a history of trauma indicates that continued 1:1 therapy will be helpful in addressing his treatment goals through teaching the relationship between language, thoughts, feelings, and behaviors, and how these impact his symptoms.     Patient was able to participate and benefit from treatment as evidenced by his verbal expression and understanding of ideas discussed. Patient continues to be motivated for treatment.        PLAN:     Follow-up: Continue 1:1 individual therapy using a blend of narrative, solution-focused, and CBT modalities. Patient was not assigned homework to complete for the following session.      Review of long term goals: The patient s treatment plan was last updated on: 9/10/19 and will be reviewed on 12/10/19      Performed and documented by: VIKASH Morin    I was not present for the session. I reviewed the case and the note and I agree with the plan. Tanja Crawford, PHD, LP  Supervising Clinician: Tanja Crawford, STEVEN, LMFT

## 2019-11-06 RX ORDER — CLONIDINE HYDROCHLORIDE 0.2 MG/1
TABLET ORAL
Qty: 90 TABLET | Refills: 0 | Status: SHIPPED | OUTPATIENT
Start: 2019-11-06 | End: 2019-11-14 | Stop reason: DRUGHIGH

## 2019-11-06 NOTE — TELEPHONE ENCOUNTER
Writer received incoming call from patient [phone] wanting an update regarding the request to decrease Clonidine. Writer updated patient that we are still waiting on approval from Dr. Ann regarding the taper schedule. He is currently at the pharmacy and requested this be expedited. Writer agreed with the plan.

## 2019-11-14 ENCOUNTER — OFFICE VISIT (OUTPATIENT)
Dept: PSYCHIATRY | Facility: CLINIC | Age: 24
End: 2019-11-14
Attending: PSYCHIATRY & NEUROLOGY
Payer: COMMERCIAL

## 2019-11-14 VITALS
SYSTOLIC BLOOD PRESSURE: 132 MMHG | HEART RATE: 108 BPM | BODY MASS INDEX: 26.2 KG/M2 | WEIGHT: 164 LBS | DIASTOLIC BLOOD PRESSURE: 80 MMHG

## 2019-11-14 DIAGNOSIS — F43.10 PTSD (POST-TRAUMATIC STRESS DISORDER): Primary | ICD-10-CM

## 2019-11-14 DIAGNOSIS — Z51.81 ENCOUNTER FOR THERAPEUTIC DRUG MONITORING: ICD-10-CM

## 2019-11-14 DIAGNOSIS — F11.20 OPIOID USE DISORDER, SEVERE, ON MAINTENANCE THERAPY (H): ICD-10-CM

## 2019-11-14 DIAGNOSIS — F41.9 ANXIETY: ICD-10-CM

## 2019-11-14 PROCEDURE — G0463 HOSPITAL OUTPT CLINIC VISIT: HCPCS | Mod: ZF

## 2019-11-14 RX ORDER — QUETIAPINE FUMARATE 25 MG/1
25-75 TABLET, FILM COATED ORAL
Qty: 90 TABLET | Refills: 0 | Status: SHIPPED | OUTPATIENT
Start: 2019-11-14 | End: 2019-12-05 | Stop reason: DRUGHIGH

## 2019-11-14 RX ORDER — PRAZOSIN HYDROCHLORIDE 2 MG/1
4 CAPSULE ORAL AT BEDTIME
Qty: 60 CAPSULE | Refills: 1 | Status: SHIPPED | OUTPATIENT
Start: 2019-11-14 | End: 2019-12-05

## 2019-11-14 RX ORDER — PRAZOSIN HYDROCHLORIDE 1 MG/1
CAPSULE ORAL
Qty: 7 CAPSULE | Refills: 0 | Status: SHIPPED | OUTPATIENT
Start: 2019-11-14 | End: 2019-12-05

## 2019-11-14 RX ORDER — CLONIDINE HYDROCHLORIDE 0.3 MG/1
0.3 TABLET ORAL 2 TIMES DAILY
Qty: 60 TABLET | Refills: 1 | Status: SHIPPED | OUTPATIENT
Start: 2019-11-14 | End: 2019-12-05 | Stop reason: DRUGHIGH

## 2019-11-14 ASSESSMENT — PAIN SCALES - GENERAL: PAINLEVEL: NO PAIN (0)
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
## 2019-11-22 ASSESSMENT — PATIENT HEALTH QUESTIONNAIRE - PHQ9: SUM OF ALL RESPONSES TO PHQ QUESTIONS 1-9: 3

## 2019-11-25 DIAGNOSIS — F41.9 ANXIETY: ICD-10-CM

## 2019-11-25 DIAGNOSIS — F43.10 PTSD (POST-TRAUMATIC STRESS DISORDER): ICD-10-CM

## 2019-11-26 RX ORDER — QUETIAPINE FUMARATE 25 MG/1
TABLET, FILM COATED ORAL
Qty: 90 TABLET | Refills: 0 | OUTPATIENT
Start: 2019-11-26

## 2019-11-29 ENCOUNTER — TELEPHONE (OUTPATIENT)
Dept: PSYCHIATRY | Facility: CLINIC | Age: 24
End: 2019-11-29

## 2019-11-29 NOTE — TELEPHONE ENCOUNTER
Writer placed a call to patient at [phone] to relay providers approval to increase Seroquel from 75 mg to 100 mg. Patient agreed with the plan. He has enough medication to last for few days and agreed to call the clinic for additional refills.     No further action needed by this writer

## 2019-11-29 NOTE — TELEPHONE ENCOUNTER
Last seen: 11/14/19  RTC: 4 weeks   Cancel: 11/21/19  No-show: none   Next appt: 12/5/19    Incoming refill from patient via phone     Medication requested: QUEtiapine (SEROQUEL) 25 MG tablet  Directions: Take 1-3 tablets (25-75 mg) by mouth nightly as needed (agitation/sleep) - Oral  Qty: 90  Last refilled: 11/14/19    Per chart review, last refilled Seroquel 75 mg PRN #[id number] on 11/14/19. Patient at this time is requesting a refill for Seroquel 100 mg. Placed a call to patient at [phone] to gather additional information. He shared experiencing difficulty sleeping and therefore would like to increase the Seroquel back to 100 mg daily. Reports experiencing difficulty falling asleep and staying asleep. On average sleep a night is 4-5 hours. Writer informed patient that provider is out of clinic and therefore will be difficult to get a response soon. He agreed to wait till Monday for a response from provider.     Routed to provider

## 2019-11-29 NOTE — TELEPHONE ENCOUNTER
[facility] Call Center    Phone Message    May a detailed message be left on voicemail: yes    Reason for Call: Medication Refill Request    Has the patient contacted the pharmacy for the refill? Yes  Name of medication being requested: seroquel 100 mg   Provider who prescribed the medication: Noah Pandey  Pharmacy: [facility] [address]    Date medication is needed: Today - patient is out of medication     Patient is requesting a refill of Seroquel 100 mg instead of 25 mg.    Action Taken: Message routed to:  Other: Psychiatry Nurse Pool

## 2019-12-05 ENCOUNTER — OFFICE VISIT (OUTPATIENT)
Dept: PSYCHIATRY | Facility: CLINIC | Age: 24
End: 2019-12-05
Attending: PSYCHIATRY & NEUROLOGY
Payer: COMMERCIAL

## 2019-12-05 VITALS
DIASTOLIC BLOOD PRESSURE: 88 MMHG | BODY MASS INDEX: 25.88 KG/M2 | SYSTOLIC BLOOD PRESSURE: 135 MMHG | WEIGHT: 162 LBS | HEART RATE: 83 BPM

## 2019-12-05 DIAGNOSIS — F10.239 ALCOHOL DEPENDENCE WITH WITHDRAWAL WITH COMPLICATION (H): ICD-10-CM

## 2019-12-05 DIAGNOSIS — Z51.81 ENCOUNTER FOR THERAPEUTIC DRUG MONITORING: Primary | ICD-10-CM

## 2019-12-05 DIAGNOSIS — F11.20 UNCOMPLICATED OPIOID DEPENDENCE (H): ICD-10-CM

## 2019-12-05 DIAGNOSIS — F43.10 PTSD (POST-TRAUMATIC STRESS DISORDER): ICD-10-CM

## 2019-12-05 DIAGNOSIS — F11.20 OPIOID USE DISORDER, SEVERE, DEPENDENCE (H): ICD-10-CM

## 2019-12-05 DIAGNOSIS — F41.9 ANXIETY: ICD-10-CM

## 2019-12-05 PROCEDURE — G0463 HOSPITAL OUTPT CLINIC VISIT: HCPCS | Mod: ZF

## 2019-12-05 RX ORDER — ESCITALOPRAM OXALATE 20 MG/1
20 TABLET ORAL DAILY
Qty: 30 TABLET | Refills: 1 | Status: SHIPPED | OUTPATIENT
Start: 2019-12-05 | End: 2020-01-31

## 2019-12-05 RX ORDER — PRAZOSIN HYDROCHLORIDE 2 MG/1
4 CAPSULE ORAL AT BEDTIME
Qty: 60 CAPSULE | Refills: 1 | Status: SHIPPED | OUTPATIENT
Start: 2019-12-05 | End: 2019-12-31

## 2019-12-05 RX ORDER — CLONIDINE HYDROCHLORIDE 0.2 MG/1
.1-.2 TABLET ORAL 3 TIMES DAILY
Qty: 90 TABLET | Refills: 1 | Status: SHIPPED | OUTPATIENT
Start: 2019-12-05 | End: 2019-12-27

## 2019-12-05 RX ORDER — QUETIAPINE FUMARATE 100 MG/1
100 TABLET, FILM COATED ORAL AT BEDTIME
Qty: 30 TABLET | Refills: 1 | Status: SHIPPED | OUTPATIENT
Start: 2019-12-05 | End: 2020-01-31

## 2019-12-05 RX ORDER — BUPRENORPHINE HYDROCHLORIDE AND NALOXONE HYDROCHLORIDE DIHYDRATE 8; 2 MG/1; MG/1
1 TABLET SUBLINGUAL 3 TIMES DAILY
Qty: 90 TABLET | Refills: 1 | Status: SHIPPED | OUTPATIENT
Start: 2019-12-05 | End: 2020-06-16

## 2019-12-05 ASSESSMENT — PAIN SCALES - GENERAL: PAINLEVEL: NO PAIN (0)

## 2019-12-05 NOTE — NURSING NOTE
Chief Complaint   Patient presents with     Recheck Medication     PTSD (post-traumatic stress disorder
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
## 2019-12-13 ENCOUNTER — TELEPHONE (OUTPATIENT)
Dept: PSYCHIATRY | Facility: CLINIC | Age: 24
End: 2019-12-13

## 2019-12-13 NOTE — TELEPHONE ENCOUNTER
"Writer received incoming call from pharmacistSoham ([phone]) requesting a call from Dr. Pandey. Pharmacy is wanting to discuss patients Clonidine refills and prescription. He shared that provider was paged but they have not heard back. Writer agreed to reach out to provider personally.     Reached out to provider via phone text. Received a response that provider will call once done with \"web talk\"   "
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
## 2019-12-13 NOTE — TELEPHONE ENCOUNTER
"Noah Pandey MD  You 2 minutes ago (4:41 PM)      Sorry I missed this. He filled Rx via two different insurances, with early filling. Even with the alleged lost medication, he should not be out. He is going to have to sit with this one until he sees Ignacia and me. If he manages to call back, make me the \"bad gonzalez\" on this. If he doesn't call, let him be, he needs to sit on it. We talked about his use of the medication at last visit, and I warned him...    Routing comment      Placed received incoming call from patient ([phone]) wanting an update from a previous call. Informed patient that provider has denied a early refill for Clonidine and would like this to be discussed at the appt with therapist. Patient verbalized understanding.     No further action needed by this writer   "

## 2019-12-26 ENCOUNTER — TELEPHONE (OUTPATIENT)
Dept: PSYCHIATRY | Facility: CLINIC | Age: 24
End: 2019-12-26

## 2019-12-26 NOTE — TELEPHONE ENCOUNTER
[facility] Call Center    Phone Message    May a detailed message be left on voicemail: yes    Reason for Call: Medication Refill Request    Has the patient contacted the pharmacy for the refill? Yes   Name of medication being requested: prazosin 2mg  Provider who prescribed the medication: Noah Pandey MD  Pharmacy: [facility] [address]  Date medication is needed: ASAP - pt is out of medication    Patient requested an appointment with Dr. Pandey today for a refill but patient was told he is not in the clinic today. Patient would like a phone call with an update if the medication can be refilled today.      Action Taken: Message routed to:  Other: Nursing pool

## 2019-12-26 NOTE — TELEPHONE ENCOUNTER
Reviewed pt's chart and it appears there are refills of the prazosin. Called the pharmacy and confirmed this. Notified the pt. He agreed to pick this up later today.

## 2019-12-27 ENCOUNTER — TELEPHONE (OUTPATIENT)
Dept: PSYCHIATRY | Facility: CLINIC | Age: 24
End: 2019-12-27

## 2019-12-27 DIAGNOSIS — F43.10 PTSD (POST-TRAUMATIC STRESS DISORDER): ICD-10-CM

## 2019-12-27 RX ORDER — CLONIDINE HYDROCHLORIDE 0.2 MG/1
.1-.2 TABLET ORAL 3 TIMES DAILY
Qty: 21 TABLET | Refills: 0 | Status: SHIPPED | OUTPATIENT
Start: 2019-12-27 | End: 2019-12-31
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
## 2019-12-31 ENCOUNTER — OFFICE VISIT (OUTPATIENT)
Dept: PSYCHIATRY | Facility: CLINIC | Age: 24
End: 2019-12-31
Attending: PSYCHIATRY & NEUROLOGY
Payer: COMMERCIAL

## 2019-12-31 VITALS
HEART RATE: 123 BPM | SYSTOLIC BLOOD PRESSURE: 128 MMHG | DIASTOLIC BLOOD PRESSURE: 91 MMHG | BODY MASS INDEX: 26.52 KG/M2 | WEIGHT: 166 LBS

## 2019-12-31 DIAGNOSIS — F11.20 OPIOID USE DISORDER, SEVERE, ON MAINTENANCE THERAPY (H): Primary | ICD-10-CM

## 2019-12-31 DIAGNOSIS — F43.10 PTSD (POST-TRAUMATIC STRESS DISORDER): ICD-10-CM

## 2019-12-31 DIAGNOSIS — Z51.81 ENCOUNTER FOR THERAPEUTIC DRUG MONITORING: ICD-10-CM

## 2019-12-31 DIAGNOSIS — F17.200 NICOTINE USE DISORDER: ICD-10-CM

## 2019-12-31 PROCEDURE — G0463 HOSPITAL OUTPT CLINIC VISIT: HCPCS | Mod: ZF

## 2019-12-31 RX ORDER — PRAZOSIN HYDROCHLORIDE 2 MG/1
6 CAPSULE ORAL AT BEDTIME
Qty: 90 CAPSULE | Refills: 1 | Status: SHIPPED | OUTPATIENT
Start: 2019-12-31 | End: 2020-06-16

## 2019-12-31 RX ORDER — CLONIDINE HYDROCHLORIDE 0.2 MG/1
0.2 TABLET ORAL 3 TIMES DAILY
Qty: 42 TABLET | Refills: 0 | Status: SHIPPED | OUTPATIENT
Start: 2019-12-31 | End: 2020-06-16

## 2019-12-31 RX ORDER — CLONIDINE HYDROCHLORIDE 0.2 MG/1
TABLET ORAL
Qty: 90 TABLET | Refills: 1 | OUTPATIENT
Start: 2019-12-31

## 2019-12-31 ASSESSMENT — PAIN SCALES - GENERAL: PAINLEVEL: NO PAIN (0)
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
## 2019-12-31 NOTE — PATIENT INSTRUCTIONS
Thank you for coming to the PSYCHIATRY CLINIC.    Lab Testing:  If you had lab testing today and your results are reassuring or normal they will be mailed to you or sent through [employer] within 7 days.   If the lab tests need quick action we will call you with the results.  The phone number we will call with results is # [phone] (home) . If this is not the best number please call our clinic and change the number.    Medication Refills:  If you need any refills please call your pharmacy and they will contact us. Our fax number for refills is [phone]. Please allow three business for refill processing.   If you need to  your refill at a new pharmacy, please contact the new pharmacy directly. The new pharmacy will help you get your medications transferred.     Scheduling:  If you have any concerns about today's visit or wish to schedule another appointment please call our office during normal business hours [phone] (8-5:00 M-F)    Contact Us:  Please call [phone] during business hours (8-5:00 M-F).  If after clinic hours, or on the weekend, please call  [phone].    Financial Assistance [phone]  [facility] Billing [phone]  Central Billing Office, [facility]: [phone]  [place] Billing [phone]  Medical Records [phone]      MENTAL HEALTH CRISIS NUMBERS:  [facility]:   [facility] - [phone]   Crisis Residence [facility] - [phone]   Walk-In Counseling [facility] [phone]   COPE 24/7 [place] Mobile Team for Adults - [[phone]]; Child - [[phone]]        [facility]:   [facility] - [phone]   Walk-in counseling [facility] - [phone]   Walk-in counseling [facility] - [phone]   Crisis Residence [facility] - [phone]   Urgent Care Adult Mental Health:   --Drop-in, 24/7 crisis line, and [place] Mobile Team  [[phone]]    CRISIS TEXT LINE: Text 097-137 from anywhere, anytime, any crisis 24/7;    OR SEE www.crisistextline.org     Poison Control Center - [phone]    CHILD: [facility] needs assessment team - [phone]     [facility] - [phone]; or [facility] Lifeline - [phone]    If you have a medical emergency please call 911or go to the nearest ER.                    _____________________________________________    Again thank you for choosing PSYCHIATRY CLINIC and please let us know how we can best partner with you to improve you and your family's health.  You may be receiving a survey in the mail regarding this appointment. We would love to have your feedback, both positive and negative, so please fill out the survey and return it using the provided envelope. The survey is done by an external company, so your answers are anonymous.

## 2020-01-31 ENCOUNTER — OFFICE VISIT (OUTPATIENT)
Dept: FAMILY MEDICINE | Facility: CLINIC | Age: 25
End: 2020-01-31
Payer: COMMERCIAL

## 2020-01-31 VITALS
RESPIRATION RATE: 14 BRPM | WEIGHT: 169.5 LBS | TEMPERATURE: 97.1 F | SYSTOLIC BLOOD PRESSURE: 128 MMHG | OXYGEN SATURATION: 100 % | HEART RATE: 88 BPM | HEIGHT: 67 IN | DIASTOLIC BLOOD PRESSURE: 88 MMHG | BODY MASS INDEX: 26.6 KG/M2

## 2020-01-31 DIAGNOSIS — M54.12 CERVICAL RADICULOPATHY: Primary | ICD-10-CM

## 2020-01-31 DIAGNOSIS — G89.29 CHRONIC NECK PAIN: ICD-10-CM

## 2020-01-31 DIAGNOSIS — M25.511 CHRONIC RIGHT SHOULDER PAIN: ICD-10-CM

## 2020-01-31 DIAGNOSIS — G89.29 CHRONIC RIGHT SHOULDER PAIN: ICD-10-CM
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
## 2020-01-31 DIAGNOSIS — F19.10 POLYSUBSTANCE ABUSE (H): ICD-10-CM

## 2020-01-31 DIAGNOSIS — F19.20 CHEMICAL DEPENDENCY (H): ICD-10-CM

## 2020-01-31 DIAGNOSIS — M54.2 CHRONIC NECK PAIN: ICD-10-CM

## 2020-01-31 DIAGNOSIS — F43.10 PTSD (POST-TRAUMATIC STRESS DISORDER): ICD-10-CM

## 2020-01-31 PROCEDURE — 99204 OFFICE O/P NEW MOD 45 MIN: CPT | Performed by: FAMILY MEDICINE

## 2020-01-31 RX ORDER — QUETIAPINE FUMARATE 100 MG/1
100 TABLET, FILM COATED ORAL AT BEDTIME
Qty: 30 TABLET | Refills: 0 | Status: ON HOLD | OUTPATIENT
Start: 2020-01-31 | End: 2020-07-27

## 2020-01-31 RX ORDER — ESCITALOPRAM OXALATE 20 MG/1
20 TABLET ORAL DAILY
Qty: 30 TABLET | Refills: 0 | Status: ON HOLD | OUTPATIENT
Start: 2020-01-31 | End: 2020-07-27

## 2020-01-31 ASSESSMENT — MIFFLIN-ST. JEOR: SCORE: 1717.48
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
## 2020-02-01 PROBLEM — F19.10 POLYSUBSTANCE ABUSE (H): Status: ACTIVE | Noted: 2020-02-01

## 2020-02-01 PROBLEM — F43.10 PTSD (POST-TRAUMATIC STRESS DISORDER): Status: ACTIVE | Noted: 2020-02-01

## 2020-02-03 ENCOUNTER — PATIENT OUTREACH (OUTPATIENT)
Dept: CARE COORDINATION | Facility: CLINIC | Age: 25
End: 2020-02-03

## 2020-02-03 NOTE — PROGRESS NOTES
Clinic Care Coordination Contact  [facility]/Voicemail    Clinical Data: Care Coordinator Outreach    Outreach attempted x 1.  Left message on patient's voicemail with call back information and requested return call.    Plan: Care Coordinator will try to reach patient again in 1-2 business days.    Tammi Rasmussen, [facility]  Clinic Care Coordinator   [facility], and [facility]  [phone]  [email or web address]

## 2020-02-04 ENCOUNTER — PATIENT OUTREACH (OUTPATIENT)
Dept: CARE COORDINATION | Facility: CLINIC | Age: 25
End: 2020-02-04

## 2020-02-04 NOTE — LETTER
[place] CARE COORDINATION  [facility]  [address], [place] [address]    February 4, 2020    Jaime Mccormick  [address]  [facility] [address]      Dear Jaime,    I am a clinic care coordinator who works with [facility] at [facility]. I have been trying to reach you recently to introduce Clinic Care Coordination and to see if there was anything I could assist you with. I wanted to introduce myself and provide you with my contact information so that you can call me with questions or concerns about your health care. Below is a description of clinic care coordination and how I can further assist you.     The clinic care coordinator is a registered nurse and/or  who understand the health care system. The goal of clinic care coordination is to help you manage your health and improve access to the [facility] system in the most efficient manner. The registered nurse can assist you in meeting your health care goals by providing education, coordinating services, and strengthening the communication among your providers. The  can assist you with financial, behavioral, psychosocial, chemical dependency, counseling, and/or psychiatric resources.    Please feel free to contact me with any questions or concerns. We at [facility] are focused on providing you with the highest-quality healthcare experience possible and that all starts with you.     Sincerely,     Tammi Rasmussen, [facility]  Clinic Care Coordinator   [facility], and [facility]  [phone]  [email or web address]  Enclosed: I have enclosed a copy of a 24 Hour Access Plan. This has helpful phone numbers for you to call when needed. Please keep this in an easy to access place to use as needed.

## 2020-02-04 NOTE — PROGRESS NOTES
Clinic Care Coordination Contact  [facility]/Voicemail    Clinical Data: Care Coordinator Outreach    Outreach attempted x 2.  Left message on patient's voicemail with call back information and requested return call.    Plan: Care Coordinator will send care coordination introduction letter with care coordinator contact information and explanation of care coordination services via mail. Care Coordinator will do no further outreaches at this time.    Tammi Rasmussen, [facility]  Clinic Care Coordinator   [facility], and [facility]  [phone]  [email or web address]

## 2020-02-04 NOTE — LETTER
Health Care Home - Access Care Plan    About Me:    Patient Name:  Jaime Mccormick    YOB: 1995  Age:                      24 year old   [place] MRN:     [id number] Telephone Information:   Home Phone [phone]   Mobile Not on file.       Address:  [address]  [facility] [place] [facility] [address] Email address:  [email or web address]      Emergency Contact(s)   Name Relationship Lgl Grd Work Phone Home Phone Mobile Phone   1. KRUPA CALHOUN Other    [phone]   2. DECLINED Declined                 Health Maintenance: Routine Health maintenance Reviewed: Due/Overdue   Health Maintenance Due   Topic Date Due     PREVENTIVE CARE VISIT  1995     DEPRESSION ACTION PLAN  1995     DTAP/TDAP/TD IMMUNIZATION (1 - Tdap) 03/18/2002     HIV SCREENING  03/18/2010     PNEUMOCOCCAL IMMUNIZATION 19-64 MEDIUM RISK (1 of 1 - PPSV23) 03/18/2014     INFLUENZA VACCINE (1) 09/01/2019     My Access Plan  Medical Emergency 094   Questions or concerns during clinic hours Primary Clinic Line, I will call the clinic directly: [facility] - [phone]   24 Hour Appointment Line [phone] or  [phone] [phone]) (toll free)   24 Hour Nurse Line [phone] (toll free)   Questions or concerns outside clinic hours 24 Hour Appointment Line, I will call the after-hours on-call line:   [facility] [phone] or [phone]) (toll-free)   Preferred Urgent Care     Preferred Hospital     Preferred Pharmacy [facility] [place], [place] - [address]     Behavioral Health Crisis Line The National Suicide Prevention Lifeline at 1-356.924.6014 or 911       My Care Team Members  Patient Care Team       Relationship Specialty Notifications Start End    Clinic, [facility] PCP - General   1/31/20     Phone: [phone] Fax: [phone] [address] [facility] [address]    Yoli James  KATELYNN So Assigned PCP   5/16/19     Phone: [phone] Fax: [phone]         [address] [facility] [address] [facility] [address]    Noah Pandey MD MD Pediatrics  7/30/19     Phone: [phone] Fax: [phone] [address] [facility] [address]           My Medical and Care Information  Problem List   Patient Active Problem List   Diagnosis     Alcohol withdrawal (H)     Chemical dependency (H)     Polysubstance abuse (H)     PTSD (post-traumatic stress disorder)      Current Medications and Allergies:  See printed Medication Report   Current Outpatient Medications   Medication     buprenorphine-naloxone (SUBOXONE) 8-2 MG SUBL sublingual tablet     cloNIDine (CATAPRES) 0.2 MG tablet     cloNIDine (CATAPRES) 0.3 MG tablet     escitalopram (LEXAPRO) 20 MG tablet     prazosin (MINIPRESS) 2 MG capsule     QUEtiapine (SEROQUEL) 100 MG tablet     No current facility-administered medications for this visit.      Facility-Administered Medications Ordered in Other Visits   Medication     Self Administer Medications: Behavioral Services

## 2020-02-21 NOTE — PHARMACY-ADMISSION MEDICATION HISTORY
Admission medication history for the May 9, 2019 admission is complete.     Medication history interview sources: Patient, chart review, care everywhere    Medication compliance: N/A - patient not prescribed medications    Preferred Outpatient Pharmacy: N/A    Changes made to PTA medication list (reason)  Added: none  Deleted: none  Changed: none    Additional medication history information (including reliability of information, actions taken by pharmacist):  - Patient denies taking/being prescribed prescription medications and over-the-counter (OTC) products such as vitamins, sleep aids, etc for over 6 months.    Prior to Admission medications    Not on File       Time spent: 5 minutes    Medication history completed by:   Belen Gonzalez PharmD  [facility]  Available daily from 1-9 PM: phone [phone], ascom [phone], pager [phone]     Pt did not have additional imaging for breast as recommended last year due to no insurance.  She currently has [facility].  Informed pt I will contact [facility] radiology and we will let her know what imaging she needs to have.

## 2020-06-04 ENCOUNTER — VIRTUAL VISIT (OUTPATIENT)
Dept: FAMILY MEDICINE | Facility: CLINIC | Age: 25
End: 2020-06-04
Payer: COMMERCIAL

## 2020-06-04 DIAGNOSIS — Z53.9 ERRONEOUS ENCOUNTER--DISREGARD: Primary | ICD-10-CM

## 2020-06-04 PROBLEM — F17.200 MODERATE TOBACCO DEPENDENCE: Status: ACTIVE | Noted: 2020-02-18

## 2020-06-04 PROBLEM — F33.1 DEPRESSION, MAJOR, RECURRENT, MODERATE (H): Status: ACTIVE | Noted: 2020-02-18

## 2020-06-04 PROBLEM — F41.9 ANXIETY DISORDER: Status: ACTIVE | Noted: 2020-02-01

## 2020-06-04 PROBLEM — F11.21: Status: ACTIVE | Noted: 2020-02-18

## 2020-06-16 ENCOUNTER — HOSPITAL ENCOUNTER (OUTPATIENT)
Facility: CLINIC | Age: 25
Setting detail: OBSERVATION
Discharge: HOME | End: 2020-06-17
Attending: EMERGENCY MEDICINE | Admitting: INTERNAL MEDICINE
Payer: COMMERCIAL

## 2020-06-16 DIAGNOSIS — F19.20 CHEMICAL DEPENDENCY (H): Primary | ICD-10-CM

## 2020-06-16 DIAGNOSIS — R11.2 NAUSEA AND VOMITING, INTRACTABILITY OF VOMITING NOT SPECIFIED, UNSPECIFIED VOMITING TYPE: ICD-10-CM

## 2020-06-16 DIAGNOSIS — F13.939 WITHDRAWAL FROM SEDATIVE, HYPNOTIC, OR ANXIOLYTIC DRUG (H): ICD-10-CM

## 2020-06-16 DIAGNOSIS — R00.0 SINUS TACHYCARDIA: ICD-10-CM

## 2020-06-16 PROBLEM — F11.93 NARCOTIC WITHDRAWAL (H): Status: ACTIVE | Noted: 2020-06-16

## 2020-06-16 LAB
ALBUMIN SERPL-MCNC: 4.1 G/DL (ref 3.4–5)
ALBUMIN UR-MCNC: NEGATIVE MG/DL
ALP SERPL-CCNC: 108 U/L (ref 40–150)
ALT SERPL W P-5'-P-CCNC: 43 U/L (ref 0–70)
AMPHETAMINES UR QL SCN: NEGATIVE
ANION GAP SERPL CALCULATED.3IONS-SCNC: 10 MMOL/L (ref 3–14)
APPEARANCE UR: ABNORMAL
AST SERPL W P-5'-P-CCNC: 30 U/L (ref 0–45)
BARBITURATES UR QL: NEGATIVE
BASOPHILS # BLD AUTO: 0 10E9/L (ref 0–0.2)
BASOPHILS NFR BLD AUTO: 0.1 %
BENZODIAZ UR QL: NEGATIVE
BILIRUB SERPL-MCNC: 0.7 MG/DL (ref 0.2–1.3)
BILIRUB UR QL STRIP: NEGATIVE
BUN SERPL-MCNC: 33 MG/DL (ref 7–30)
CALCIUM SERPL-MCNC: 9.4 MG/DL (ref 8.5–10.1)
CANNABINOIDS UR QL SCN: NEGATIVE
CHLORIDE SERPL-SCNC: 100 MMOL/L (ref 94–109)
CO2 SERPL-SCNC: 22 MMOL/L (ref 20–32)
COCAINE UR QL: NEGATIVE
COLOR UR AUTO: YELLOW
CREAT SERPL-MCNC: 1.54 MG/DL (ref 0.66–1.25)
DIFFERENTIAL METHOD BLD: ABNORMAL
EOSINOPHIL # BLD AUTO: 0 10E9/L (ref 0–0.7)
EOSINOPHIL NFR BLD AUTO: 0.1 %
ERYTHROCYTE [DISTWIDTH] IN BLOOD BY AUTOMATED COUNT: 14.8 % (ref 10–15)
ETHANOL SERPL-MCNC: <0.01 G/DL
GFR SERPL CREATININE-BSD FRML MDRD: 62 ML/MIN/{1.73_M2}
GLUCOSE SERPL-MCNC: 158 MG/DL (ref 70–99)
GLUCOSE UR STRIP-MCNC: NEGATIVE MG/DL
HCT VFR BLD AUTO: 38.2 % (ref 40–53)
HGB BLD-MCNC: 13 G/DL (ref 13.3–17.7)
HGB UR QL STRIP: NEGATIVE
HYALINE CASTS #/AREA URNS LPF: 5 /LPF (ref 0–2)
IMM GRANULOCYTES # BLD: 0.1 10E9/L (ref 0–0.4)
IMM GRANULOCYTES NFR BLD: 0.7 %
INTERPRETATION ECG - MUSE: NORMAL
KETONES UR STRIP-MCNC: 5 MG/DL
LACTATE BLD-SCNC: 0.8 MMOL/L (ref 0.7–2)
LEUKOCYTE ESTERASE UR QL STRIP: NEGATIVE
LIPASE SERPL-CCNC: 194 U/L (ref 73–393)
LYMPHOCYTES # BLD AUTO: 1.9 10E9/L (ref 0.8–5.3)
LYMPHOCYTES NFR BLD AUTO: 10.9 %
MCH RBC QN AUTO: 29.2 PG (ref 26.5–33)
MCHC RBC AUTO-ENTMCNC: 34 G/DL (ref 31.5–36.5)
MCV RBC AUTO: 86 FL (ref 78–100)
MONOCYTES # BLD AUTO: 1.7 10E9/L (ref 0–1.3)
MONOCYTES NFR BLD AUTO: 9.7 %
MUCOUS THREADS #/AREA URNS LPF: PRESENT /LPF
NEUTROPHILS # BLD AUTO: 13.6 10E9/L (ref 1.6–8.3)
NEUTROPHILS NFR BLD AUTO: 78.5 %
NITRATE UR QL: NEGATIVE
NRBC # BLD AUTO: 0 10*3/UL
NRBC BLD AUTO-RTO: 0 /100
OPIATES UR QL SCN: NEGATIVE
PCP UR QL SCN: NEGATIVE
PH UR STRIP: 5.5 PH (ref 5–7)
PLATELET # BLD AUTO: 337 10E9/L (ref 150–450)
POTASSIUM SERPL-SCNC: 3.7 MMOL/L (ref 3.4–5.3)
PROT SERPL-MCNC: 7.7 G/DL (ref 6.8–8.8)
RBC # BLD AUTO: 4.45 10E12/L (ref 4.4–5.9)
RBC #/AREA URNS AUTO: <1 /HPF (ref 0–2)
SODIUM SERPL-SCNC: 132 MMOL/L (ref 133–144)
SOURCE: ABNORMAL
SP GR UR STRIP: 1.02 (ref 1–1.03)
UROBILINOGEN UR STRIP-MCNC: NORMAL MG/DL (ref 0–2)
WBC # BLD AUTO: 17.3 10E9/L (ref 4–11)
WBC #/AREA URNS AUTO: <1 /HPF (ref 0–5)

## 2020-06-16 PROCEDURE — 25000132 ZZH RX MED GY IP 250 OP 250 PS 637: Performed by: EMERGENCY MEDICINE

## 2020-06-16 PROCEDURE — U0003 INFECTIOUS AGENT DETECTION BY NUCLEIC ACID (DNA OR RNA); SEVERE ACUTE RESPIRATORY SYNDROME CORONAVIRUS 2 (SARS-COV-2) (CORONAVIRUS DISEASE [COVID-19]), AMPLIFIED PROBE TECHNIQUE, MAKING USE OF HIGH THROUGHPUT TECHNOLOGIES AS DESCRIBED BY CMS-2020-01-R: HCPCS | Performed by: EMERGENCY MEDICINE

## 2020-06-16 PROCEDURE — 96375 TX/PRO/DX INJ NEW DRUG ADDON: CPT

## 2020-06-16 PROCEDURE — 80053 COMPREHEN METABOLIC PANEL: CPT | Performed by: EMERGENCY MEDICINE

## 2020-06-16 PROCEDURE — 96376 TX/PRO/DX INJ SAME DRUG ADON: CPT

## 2020-06-16 PROCEDURE — 25800030 ZZH RX IP 258 OP 636: Performed by: INTERNAL MEDICINE

## 2020-06-16 PROCEDURE — 99220 ZZC INITIAL OBSERVATION CARE,LEVL III: CPT | Performed by: INTERNAL MEDICINE

## 2020-06-16 PROCEDURE — 96361 HYDRATE IV INFUSION ADD-ON: CPT

## 2020-06-16 PROCEDURE — 83605 ASSAY OF LACTIC ACID: CPT | Performed by: INTERNAL MEDICINE

## 2020-06-16 PROCEDURE — 25000128 H RX IP 250 OP 636: Performed by: EMERGENCY MEDICINE

## 2020-06-16 PROCEDURE — 93005 ELECTROCARDIOGRAM TRACING: CPT

## 2020-06-16 PROCEDURE — G0378 HOSPITAL OBSERVATION PER HR: HCPCS

## 2020-06-16 PROCEDURE — 99285 EMERGENCY DEPT VISIT HI MDM: CPT | Mod: 25

## 2020-06-16 PROCEDURE — 80307 DRUG TEST PRSMV CHEM ANLYZR: CPT | Performed by: EMERGENCY MEDICINE

## 2020-06-16 PROCEDURE — 25800030 ZZH RX IP 258 OP 636: Performed by: EMERGENCY MEDICINE

## 2020-06-16 PROCEDURE — 36415 COLL VENOUS BLD VENIPUNCTURE: CPT | Performed by: INTERNAL MEDICINE

## 2020-06-16 PROCEDURE — 25000128 H RX IP 250 OP 636: Performed by: INTERNAL MEDICINE

## 2020-06-16 PROCEDURE — 81001 URINALYSIS AUTO W/SCOPE: CPT | Performed by: EMERGENCY MEDICINE

## 2020-06-16 PROCEDURE — 83690 ASSAY OF LIPASE: CPT | Performed by: EMERGENCY MEDICINE

## 2020-06-16 PROCEDURE — 85025 COMPLETE CBC W/AUTO DIFF WBC: CPT | Performed by: EMERGENCY MEDICINE

## 2020-06-16 PROCEDURE — 96374 THER/PROPH/DIAG INJ IV PUSH: CPT

## 2020-06-16 PROCEDURE — 80320 DRUG SCREEN QUANTALCOHOLS: CPT | Performed by: EMERGENCY MEDICINE

## 2020-06-16 PROCEDURE — 25000132 ZZH RX MED GY IP 250 OP 250 PS 637: Performed by: INTERNAL MEDICINE

## 2020-06-16 RX ORDER — CLONIDINE HYDROCHLORIDE 0.1 MG/1
0.1 TABLET ORAL 3 TIMES DAILY
Status: DISCONTINUED | OUTPATIENT
Start: 2020-06-17 | End: 2020-06-17 | Stop reason: HOSPADM

## 2020-06-16 RX ORDER — NALOXONE HYDROCHLORIDE 0.4 MG/ML
.1-.4 INJECTION, SOLUTION INTRAMUSCULAR; INTRAVENOUS; SUBCUTANEOUS
Status: DISCONTINUED | OUTPATIENT
Start: 2020-06-16 | End: 2020-06-17 | Stop reason: HOSPADM

## 2020-06-16 RX ORDER — ESCITALOPRAM OXALATE 10 MG/1
20 TABLET ORAL DAILY
Status: DISCONTINUED | OUTPATIENT
Start: 2020-06-16 | End: 2020-06-17 | Stop reason: HOSPADM

## 2020-06-16 RX ORDER — PROCHLORPERAZINE MALEATE 5 MG
10 TABLET ORAL EVERY 6 HOURS PRN
Status: DISCONTINUED | OUTPATIENT
Start: 2020-06-16 | End: 2020-06-17 | Stop reason: HOSPADM

## 2020-06-16 RX ORDER — CLONIDINE HYDROCHLORIDE 0.1 MG/1
0.1 TABLET ORAL 3 TIMES DAILY
Status: DISCONTINUED | OUTPATIENT
Start: 2020-06-16 | End: 2020-06-16

## 2020-06-16 RX ORDER — LORAZEPAM 2 MG/ML
1 INJECTION INTRAMUSCULAR ONCE
Status: COMPLETED | OUTPATIENT
Start: 2020-06-16 | End: 2020-06-16

## 2020-06-16 RX ORDER — ACETAMINOPHEN 325 MG/1
650 TABLET ORAL EVERY 4 HOURS PRN
Status: DISCONTINUED | OUTPATIENT
Start: 2020-06-16 | End: 2020-06-17 | Stop reason: HOSPADM

## 2020-06-16 RX ORDER — METHADONE HYDROCHLORIDE 10 MG/ML
110 CONCENTRATE ORAL DAILY
Status: DISCONTINUED | OUTPATIENT
Start: 2020-06-17 | End: 2020-06-17 | Stop reason: HOSPADM

## 2020-06-16 RX ORDER — ONDANSETRON 4 MG/1
4 TABLET, ORALLY DISINTEGRATING ORAL EVERY 6 HOURS PRN
Status: DISCONTINUED | OUTPATIENT
Start: 2020-06-16 | End: 2020-06-17 | Stop reason: HOSPADM

## 2020-06-16 RX ORDER — SODIUM CHLORIDE 9 MG/ML
1000 INJECTION, SOLUTION INTRAVENOUS CONTINUOUS
Status: DISCONTINUED | OUTPATIENT
Start: 2020-06-16 | End: 2020-06-16

## 2020-06-16 RX ORDER — METHADONE HYDROCHLORIDE 10 MG/5ML
120 SOLUTION ORAL DAILY
Status: ON HOLD | COMMUNITY
End: 2020-07-27

## 2020-06-16 RX ORDER — QUETIAPINE FUMARATE 100 MG/1
100 TABLET, FILM COATED ORAL AT BEDTIME
Status: DISCONTINUED | OUTPATIENT
Start: 2020-06-16 | End: 2020-06-17 | Stop reason: HOSPADM

## 2020-06-16 RX ORDER — AMLODIPINE BESYLATE 10 MG/1
10 TABLET ORAL DAILY
Status: DISCONTINUED | OUTPATIENT
Start: 2020-06-16 | End: 2020-06-17 | Stop reason: HOSPADM

## 2020-06-16 RX ORDER — GABAPENTIN 300 MG/1
300 CAPSULE ORAL 3 TIMES DAILY
Status: ON HOLD | COMMUNITY
End: 2020-07-27

## 2020-06-16 RX ORDER — ONDANSETRON 2 MG/ML
4 INJECTION INTRAMUSCULAR; INTRAVENOUS EVERY 6 HOURS PRN
Status: DISCONTINUED | OUTPATIENT
Start: 2020-06-16 | End: 2020-06-17 | Stop reason: HOSPADM

## 2020-06-16 RX ORDER — LIDOCAINE 40 MG/G
CREAM TOPICAL
Status: DISCONTINUED | OUTPATIENT
Start: 2020-06-16 | End: 2020-06-17 | Stop reason: HOSPADM

## 2020-06-16 RX ORDER — SODIUM CHLORIDE 9 MG/ML
INJECTION, SOLUTION INTRAVENOUS CONTINUOUS
Status: DISCONTINUED | OUTPATIENT
Start: 2020-06-16 | End: 2020-06-17

## 2020-06-16 RX ORDER — METHADONE HYDROCHLORIDE 10 MG/ML
110 CONCENTRATE ORAL ONCE
Status: COMPLETED | OUTPATIENT
Start: 2020-06-16 | End: 2020-06-16

## 2020-06-16 RX ORDER — METHADONE HYDROCHLORIDE 5 MG/1
110 TABLET ORAL ONCE
Status: DISCONTINUED | OUTPATIENT
Start: 2020-06-16 | End: 2020-06-16

## 2020-06-16 RX ORDER — ONDANSETRON 2 MG/ML
4 INJECTION INTRAMUSCULAR; INTRAVENOUS EVERY 30 MIN PRN
Status: DISCONTINUED | OUTPATIENT
Start: 2020-06-16 | End: 2020-06-16

## 2020-06-16 RX ORDER — AMLODIPINE BESYLATE 10 MG/1
10 TABLET ORAL EVERY EVENING
Status: ON HOLD | COMMUNITY
End: 2020-07-27

## 2020-06-16 RX ORDER — AMLODIPINE BESYLATE 10 MG/1
10 TABLET ORAL DAILY
Status: DISCONTINUED | OUTPATIENT
Start: 2020-06-16 | End: 2020-06-16

## 2020-06-16 RX ORDER — GABAPENTIN 300 MG/1
300 CAPSULE ORAL 3 TIMES DAILY
Status: DISCONTINUED | OUTPATIENT
Start: 2020-06-16 | End: 2020-06-17 | Stop reason: HOSPADM

## 2020-06-16 RX ORDER — PROCHLORPERAZINE 25 MG
25 SUPPOSITORY, RECTAL RECTAL EVERY 12 HOURS PRN
Status: DISCONTINUED | OUTPATIENT
Start: 2020-06-16 | End: 2020-06-17 | Stop reason: HOSPADM

## 2020-06-16 RX ADMIN — LORAZEPAM 1 MG: 2 INJECTION INTRAMUSCULAR; INTRAVENOUS at 09:57

## 2020-06-16 RX ADMIN — AMLODIPINE BESYLATE 10 MG: 10 TABLET ORAL at 21:20

## 2020-06-16 RX ADMIN — GABAPENTIN 300 MG: 300 CAPSULE ORAL at 21:20

## 2020-06-16 RX ADMIN — ONDANSETRON 4 MG: 2 INJECTION INTRAMUSCULAR; INTRAVENOUS at 20:47

## 2020-06-16 RX ADMIN — LORAZEPAM 1 MG: 2 INJECTION INTRAMUSCULAR; INTRAVENOUS at 16:17

## 2020-06-16 RX ADMIN — ONDANSETRON 4 MG: 2 INJECTION INTRAMUSCULAR; INTRAVENOUS at 11:16

## 2020-06-16 RX ADMIN — SODIUM CHLORIDE 1000 ML: 9 INJECTION, SOLUTION INTRAVENOUS at 09:06

## 2020-06-16 RX ADMIN — SODIUM CHLORIDE 1000 ML: 9 INJECTION, SOLUTION INTRAVENOUS at 11:18

## 2020-06-16 RX ADMIN — ESCITALOPRAM OXALATE 20 MG: 10 TABLET ORAL at 21:20

## 2020-06-16 RX ADMIN — LORAZEPAM 1 MG: 2 INJECTION INTRAMUSCULAR; INTRAVENOUS at 09:04

## 2020-06-16 RX ADMIN — SODIUM CHLORIDE 1000 ML: 9 INJECTION, SOLUTION INTRAVENOUS at 10:01

## 2020-06-16 RX ADMIN — SODIUM CHLORIDE: 9 INJECTION, SOLUTION INTRAVENOUS at 19:40

## 2020-06-16 RX ADMIN — LORAZEPAM 1 MG: 2 INJECTION INTRAMUSCULAR; INTRAVENOUS at 12:09

## 2020-06-16 RX ADMIN — METHADONE HYDROCHLORIDE 110 MG: 10 CONCENTRATE ORAL at 14:00

## 2020-06-16 RX ADMIN — QUETIAPINE FUMARATE 100 MG: 100 TABLET ORAL at 21:20

## 2020-06-16 ASSESSMENT — ENCOUNTER SYMPTOMS
ABDOMINAL PAIN: 1
FEVER: 0
NAUSEA: 1
DIARRHEA: 0
VOMITING: 1

## 2020-06-16 ASSESSMENT — ACTIVITIES OF DAILY LIVING (ADL): ADLS_ACUITY_SCORE: 10

## 2020-06-16 NOTE — ED NOTES
Release of information faxed to [facility], patient's methadone clinic fax [phone], with the purpose of obtaining his methadone dose. Writer spoke with one of the clinic pharmacists.
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
## 2020-06-16 NOTE — ED NOTES
Pt given oral methadone, pt still tremulous thought states he feels somewhat better. Pt denies any other polysubstance abuse, reports that yesterday he did try and take a percocet to feel better but threw it up.

## 2020-06-16 NOTE — ED NOTES
Bed: ED20  Expected date:   Expected time:   Means of arrival:   Comments:  [facility] - 419 - 25 M vomiting tachy no covid eta 0829

## 2020-06-16 NOTE — PHARMACY-ADMISSION MEDICATION HISTORY
Pharmacy Medication History  Admission medication history interview status for the 6/16/2020  admission is complete. See EPIC admission navigator for prior to admission medications     Medication history sources: Patient, Surescripts and Pharmacy (Margaret [phone])  Medication history source reliability: Good  Adherence assessment: Good    Significant changes made to the medication list:  Added methadone dose of 110 mg PO daily. Patient gets this medication at [facility] in [facility].  Added Gabapentin and amlodipine (recently prescribed medication)  Changed Clonidine dose to 0.1 mg PO three times daily  Removed Prazosin and Suboxone as patient is no longer taking these medications    Additional medication history information:   Methadone dose was confirmed in the ED and patient was administered a dose of 110 mg.     Medication reconciliation completed by provider prior to medication history? Yes    Time spent in this activity: 30 mins      Prior to Admission medications    Medication Sig Last Dose Taking? Auth Provider   amLODIPine (NORVASC) 10 MG tablet Take 10 mg by mouth daily Past Week at Unknown time Yes Unknown, Entered By History   cloNIDine (CATAPRES) 0.1 MG tablet Take 0.1 mg by mouth 3 times daily  Past Week at Unknown time Yes Reported, Patient   escitalopram (LEXAPRO) 20 MG tablet Take 1 tablet (20 mg) by mouth daily 6/15/2020 at Unknown time Yes Katie Beaulieu DO   gabapentin (NEURONTIN) 300 MG capsule Take 300 mg by mouth 3 times daily Past Week at Unknown time Yes Unknown, Entered By History   methadone HCl 10 MG/5ML SOLN Take 110 mg by mouth daily  6/15/2020 at Unknown time Yes Reported, Patient   QUEtiapine (SEROQUEL) 100 MG tablet Take 1 tablet (100 mg) by mouth At Bedtime Past Week at Unknown time Yes Katie Beaulieu DO
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
## 2020-06-16 NOTE — PROGRESS NOTES
RECEIVING UNIT ED HANDOFF REVIEW    ED Nurse Handoff Report was reviewed by: Trish Guaman RN on June 16, 2020 at 4:46 PM

## 2020-06-17 VITALS
SYSTOLIC BLOOD PRESSURE: 127 MMHG | TEMPERATURE: 98.2 F | OXYGEN SATURATION: 100 % | DIASTOLIC BLOOD PRESSURE: 77 MMHG | HEART RATE: 125 BPM | RESPIRATION RATE: 18 BRPM | BODY MASS INDEX: 25.06 KG/M2 | WEIGHT: 160 LBS

## 2020-06-17 LAB
ANION GAP SERPL CALCULATED.3IONS-SCNC: 6 MMOL/L (ref 3–14)
BUN SERPL-MCNC: 14 MG/DL (ref 7–30)
CALCIUM SERPL-MCNC: 8.7 MG/DL (ref 8.5–10.1)
CHLORIDE SERPL-SCNC: 105 MMOL/L (ref 94–109)
CO2 SERPL-SCNC: 23 MMOL/L (ref 20–32)
CREAT SERPL-MCNC: 0.84 MG/DL (ref 0.66–1.25)
ERYTHROCYTE [DISTWIDTH] IN BLOOD BY AUTOMATED COUNT: 14.3 % (ref 10–15)
GFR SERPL CREATININE-BSD FRML MDRD: >90 ML/MIN/{1.73_M2}
GLUCOSE SERPL-MCNC: 111 MG/DL (ref 70–99)
HCT VFR BLD AUTO: 37.5 % (ref 40–53)
HGB BLD-MCNC: 12.5 G/DL (ref 13.3–17.7)
MCH RBC QN AUTO: 29.1 PG (ref 26.5–33)
MCHC RBC AUTO-ENTMCNC: 33.3 G/DL (ref 31.5–36.5)
MCV RBC AUTO: 87 FL (ref 78–100)
PLATELET # BLD AUTO: 270 10E9/L (ref 150–450)
POTASSIUM SERPL-SCNC: 3.6 MMOL/L (ref 3.4–5.3)
RBC # BLD AUTO: 4.3 10E12/L (ref 4.4–5.9)
SARS-COV-2 RNA SPEC QL NAA+PROBE: NOT DETECTED
SODIUM SERPL-SCNC: 134 MMOL/L (ref 133–144)
SPECIMEN SOURCE: NORMAL
TSH SERPL DL<=0.005 MIU/L-ACNC: 1.18 MU/L (ref 0.4–4)
WBC # BLD AUTO: 13.4 10E9/L (ref 4–11)

## 2020-06-17 PROCEDURE — 80048 BASIC METABOLIC PNL TOTAL CA: CPT | Performed by: INTERNAL MEDICINE

## 2020-06-17 PROCEDURE — 25800030 ZZH RX IP 258 OP 636: Performed by: INTERNAL MEDICINE

## 2020-06-17 PROCEDURE — 25000132 ZZH RX MED GY IP 250 OP 250 PS 637: Performed by: INTERNAL MEDICINE

## 2020-06-17 PROCEDURE — 36415 COLL VENOUS BLD VENIPUNCTURE: CPT | Performed by: INTERNAL MEDICINE

## 2020-06-17 PROCEDURE — G0378 HOSPITAL OBSERVATION PER HR: HCPCS

## 2020-06-17 PROCEDURE — 84443 ASSAY THYROID STIM HORMONE: CPT | Performed by: INTERNAL MEDICINE

## 2020-06-17 PROCEDURE — 99217 ZZC OBSERVATION CARE DISCHARGE: CPT | Performed by: INTERNAL MEDICINE

## 2020-06-17 PROCEDURE — 85027 COMPLETE CBC AUTOMATED: CPT | Performed by: INTERNAL MEDICINE

## 2020-06-17 RX ORDER — CLONIDINE HYDROCHLORIDE 0.1 MG/1
0.1 TABLET ORAL 3 TIMES DAILY
Qty: 90 TABLET | Refills: 0 | Status: ON HOLD | OUTPATIENT
Start: 2020-06-17 | End: 2020-07-27

## 2020-06-17 RX ADMIN — AMLODIPINE BESYLATE 10 MG: 10 TABLET ORAL at 09:45

## 2020-06-17 RX ADMIN — METHADONE HYDROCHLORIDE 110 MG: 10 CONCENTRATE ORAL at 12:03

## 2020-06-17 RX ADMIN — GABAPENTIN 300 MG: 300 CAPSULE ORAL at 09:45

## 2020-06-17 RX ADMIN — SODIUM CHLORIDE: 9 INJECTION, SOLUTION INTRAVENOUS at 05:40

## 2020-06-17 RX ADMIN — CLONIDINE HYDROCHLORIDE 0.1 MG: 0.1 TABLET ORAL at 09:45

## 2020-06-17 NOTE — PHARMACY - DISCHARGE MEDICATION RECONCILIATION
"Pharmacy Discharge Medication Reconciliation  The SOC Discharge Team has provided discharge medication reconciliation assistance for this patient. The following includes a summary of medications that were reconciled for discharge and medications that still require final clinical decision by the provider.     Current discharge plan: Home   Approximate discharge date: 6/18/20  Patient's preferred pharmacy: Discharge Pharmacy     Medications still requiring physician discharge medication reconciliation/clinical decision  - Methadone: Patient taking PTA and during admission, please determine if patient will receive on discharge.       Medications reconciled as \"PRESCRIBE\" that are NEW or MODIFIED this hospitalization      Medications reconciled as \"RESUME\" at discharge   - All PTA medications were resumed at the same doses and directions unless otherwise noted    Medications reconciled as \"DISCONTINUE\" at discharge  - All protocol medications, IV medications, and PRN medications not in use were discontinued unless otherwise noted    Please note:   *All OTC medications are not E-prescribed unless otherwise noted. Please review and E-prescribe if patient requests.   *Patient's orders were routed to preferred pharmacy (see above). If unable to determine, will route to Hospital Discharge Pharmacy.   *Inpatient medication use was evaluated for discharge medication reconciliation. PRN medications with infrequent use or no use within the last 48 hours or medications ordered through an orderset or protocol that are not appropriate for discharge were not prescribed.      Thank you for the opportunity to care for this patient    Daryl Dennis  Pharmacy Student  [phone]      "

## 2020-06-17 NOTE — PLAN OF CARE
"Summary:     DATE & TIME: 6/16/2020 9209-8855   Cognitive Concerns/ Orientation : A&Ox4   BEHAVIOR & AGGRESSION TOOL COLOR: Green  CIWA SCORE: NA   ABNL VS/O2: VSS on RA- except tachy (130's)  MOBILITY: indep.   PAIN MANAGMENT: denies; zofran IV given 1x- w/ relief   DIET: Regular   BOWEL/BLADDER: cont.   ABNL LAB/BG: creat 1.54, WBC 17.3  DRAIN/DEVICES: PIV R ARM infusing NS @100  TELEMETRY RHYTHM: sinus tachy   SKIN: WDL-intact  TESTS/PROCEDURES: drug screen (-).   D/C DAY/GOALS/PLACE: pending progress   OTHER IMPORTANT INFO: Sputum sample still needed. Patient comes from sober house- on scheduled methadone. Patient tremulous on bilat upper extrem.- states its been like this for the \"past couple days.\"   "

## 2020-06-17 NOTE — PROGRESS NOTES
DATE & TIME: 6/16 night shift    Cognitive Concerns/ Orientation : A&Ox4- calm, slept most of night   BEHAVIOR & AGGRESSION TOOL COLOR: green  CIWA SCORE: na   ABNL VS/O2: VSS on RA ex tachy (120s-130s)   MOBILITY: independent in room   PAIN MANAGMENT: denied pain on shift  DIET: regular   BOWEL/BLADDER: continent of B/B  ABNL LAB/BG: creat 1.54 WBC 17.3   DRAIN/DEVICES: PIV infusing NS @ 100 ml/hr   TELEMETRY RHYTHM: Sinus tach   SKIN: WDL-tact  TESTS/PROCEDURES: drug screen negative   D/C DAY/GOALS/PLACE: pending progress   OTHER IMPORTANT INFO: Sputum sample needed. Scheduled methadone. Per patient, been tremulous in upper extremities last couple of days. Denied N/V on shift. Continue to monitor.   MD/RN ROUNDING SIGNED OFF D/E SHIFT: na  COMMIT TO SIT DONE AND SIGNED OFF yes
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
## 2020-06-17 NOTE — PROGRESS NOTES
Discharge    Patient discharged to home via wheelchair with significant other  Care plan note: See POC note    Listed belongings gathered and returned to patient. Yes  Care Plan and Patient education resolved: Yes  Prescriptions if needed, hard copies sent with patient  NA  Home and hospital acquired medications returned to patient: Yes  Medication Bin checked and emptied on discharge Yes  Follow up appointment made for patient: No

## 2020-06-17 NOTE — PLAN OF CARE
DATE & TIME: 6/17/20  1400         Cognitive Concerns/ Orientation : A&Ox4- calm.  BEHAVIOR & AGGRESSION TOOL COLOR: green  CIWA SCORE: na      ABNL VS/O2: VSS on RA ex tachy in the 120's.  MOBILITY: independent in room   PAIN MANAGMENT: denied pain on shift  DIET: regular   BOWEL/BLADDER: continent of B/B  ABNL LAB/BG: WBC 13.4  DRAIN/DEVICES: IV discontinued  TELEMETRY RHYTHM: Sinus tach   SKIN: WDL-tact  TESTS/PROCEDURES: drug screen negative   D/C DAY/GOALS/PLACE: Discharge today  OTHER IMPORTANT INFO:  Scheduled methadone. Per patient, been tremulous in upper extremities last couple of days. Denied N/V on shift, tolerated lunch well.  MD/RN ROUNDING SIGNED OFF D/E SHIFT: Yes  COMMIT TO SIT DONE AND SIGNED OFF yes
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
## 2020-06-18 ENCOUNTER — TELEPHONE (OUTPATIENT)
Dept: FAMILY MEDICINE | Facility: CLINIC | Age: 25
End: 2020-06-18

## 2020-06-18 NOTE — TELEPHONE ENCOUNTER
Patient Contact    Attempt # 1    Was call answered?  No.  Left message on voicemail with information to call the clinic back to complete hospital follow up.     On call back: hospital follow up   No current hospital follow up appointment scheduled.

## 2020-06-19 NOTE — TELEPHONE ENCOUNTER
"Hospital/TCU/ED for chronic condition Discharge Protocol    \"Hi, my name is Maria Luisa Grimaldo RN, a registered nurse, and I am calling from [facility].  I am calling to follow up and see how things are going for you after your recent emergency visit/hospital/TCU stay.\"    Tell me how you are doing now that you are home?\"     Pt is doing well. Sx resolved and feeling better. Back on medications correctly.      Discharge Instructions    \"Let's review your discharge instructions.  What is/are the follow-up recommendations?  Pt. Response: Follow up with behavioral health and with primary    \"Has an appointment with your primary care provider been scheduled?\"   Yes. (confirm)    \"When you see the provider, I would recommend that you bring your medications with you.\"    Medications    \"Tell me what changed about your medicines when you discharged?\"    Changes to chronic meds?    0-1    \"What questions do you have about your medications?\"    None     New diagnoses of heart failure, COPD, diabetes, or MI?    No              Post Discharge Medication Reconciliation Status: discharge medications reconciled, continue medications without change.    Was MTM referral placed (*Make sure to put transitions as reason for referral)?   No    Call Summary    \"What questions or concerns do you have about your recent visit and your follow-up care?\"     none    \"If you have questions or things don't continue to improve, we encourage you contact us through the main clinic number (give number).  Even if the clinic is not open, triage nurses are available 24/7 to help you.     We would like you to know that our clinic has extended hours (provide information).  We also have urgent care (provide details on closest location and hours/contact info)\"      \"Thank you for your time and take care!\"             "

## 2020-07-02 ENCOUNTER — VIRTUAL VISIT (OUTPATIENT)
Dept: FAMILY MEDICINE | Facility: CLINIC | Age: 25
End: 2020-07-02
Payer: COMMERCIAL

## 2020-07-02 DIAGNOSIS — Z53.9 ERRONEOUS ENCOUNTER--DISREGARD: Primary | ICD-10-CM

## 2020-07-17 ENCOUNTER — PATIENT OUTREACH (OUTPATIENT)
Dept: CARE COORDINATION | Facility: CLINIC | Age: 25
End: 2020-07-17

## 2020-07-17 DIAGNOSIS — Z71.89 COUNSELING AND COORDINATION OF CARE: Primary | ICD-10-CM

## 2020-07-17 NOTE — LETTER
[facility] CARE COORDINATION  [facility]  [address], [place] [address]    July 20, 2020    Jaime Mccormick  [address]  [facility] [address]      Dear Jaime,    I am a clinic care coordinator who works with Katie Beaulieu DO at [facility]. I wanted to introduce myself and provide you with my contact information for you to be able to call me with any questions or concerns. Below is a description of clinic care coordination and how I can further assist you.      The clinic care coordination team is made up of a registered nurse,  and community health worker who understand the health care system. The goal of clinic care coordination is to help you manage your health and improve access to the health care system in the most efficient manner. The team can assist you in meeting your health care goals by providing education, coordinating services, strengthening the communication among your providers and supporting you with any resource needs.    Please feel free to contact the Community Health Worker at [phone] with any questions or concerns. We are focused on providing you with the highest-quality healthcare experience possible and that all starts with you.     Sincerely,     YUE Huffman   Social Work Clinic Care Coordinator   [facility], [facility], and [facility]   PH: [phone]  [email or web address]   Enclosed: I have enclosed a copy of a 24 Hour Access Plan. This has helpful phone numbers for you to call when needed. Please keep this in an easy to access place to use as needed.

## 2020-07-17 NOTE — PROGRESS NOTES
Clinic Care Coordination Contact  [facility]/Voicemail    Clinical Data: Care Coordinator Outreach  ED follow up    Outreach attempted x 1.  Left message on patient's voicemail with call back information and requested return call.    Plan: Care Coordinator will try to reach patient again in 1-2 business days.    YUE Huffman   Social Work Clinic Care Coordinator   [facility] and [facility]   PH: [phone]  [email or web address]

## 2020-07-17 NOTE — LETTER
Health Care Home - Access Care Plan    About Me:    Patient Name:  Jaime Mccormick    YOB: 1995  Age:                      25 year old   [facility] MRN:     [id number] Telephone Information:   Home Phone [phone]   Mobile [phone]       Address:  [address]  [facility] [address] Email address:  [email or web address]      Emergency Contact(s)   Name Relationship Lgl Grd Work Phone Home Phone Mobile Phone   1. KRUPA CALHOUN Other    [phone]   2. DECLINED,PER PT Declined                 Health Maintenance:    Health Maintenance Due   Topic Date Due     PREVENTIVE CARE VISIT  1995     DEPRESSION ACTION PLAN  1995     HPV IMMUNIZATION (1 - Male 2-dose series) 03/18/2006     HIV SCREENING  03/18/2010     PNEUMOCOCCAL IMMUNIZATION 19-64 MEDIUM RISK (1 of 1 - PPSV23) 03/18/2014     DTAP/TDAP/TD IMMUNIZATION (7 - Td) 08/13/2017     URINE DRUG SCREEN  05/09/2020     PHQ-9  05/14/2020     My Access Plan  Medical Emergency 913   Questions or concerns during clinic hours Primary Clinic Line, I will call the clinic directly: [facility] [phone]   24 Hour Appointment Line [phone] or  [phone]) (toll free)   24 Hour Nurse Line [phone] (toll free)   Questions or concerns outside clinic hours 24 Hour Appointment Line, I will call the after-hours on-call line:   [facility] [phone] or [phone]) (toll-free)   Preferred Urgent Care     Preferred Hospital     Preferred Pharmacy [facility] [place], [place] - [address]     Behavioral Health Crisis Line The National Suicide Prevention Lifeline at 1-997.136.2165 or 911     My Care Team Members  Patient Care Team       Relationship Specialty Notifications Start End    Katie Beaulieu DO PCP - General Family Practice  7/2/20     Phone: [phone] Fax: [phone] [address]  [place] [address]    Noah Pandey MD MD Pediatrics  7/30/19     Phone: [phone] Fax: [phone] [address] [facility] [address]    Katie Beaulieu DO Assigned PCP   2/2/20     Phone: [phone] Fax: [phone]         [address] [facility] [address]    Tammi Rasmussen LSW Clinic Care Coordinator Primary Care - CC  7/17/20            My Medical and Care Information  Problem List   Patient Active Problem List   Diagnosis     Alcohol withdrawal (H)     Chemical dependency (H)     Polysubstance abuse (H)     Anxiety disorder     Depression, major, recurrent, moderate (H)     Moderate tobacco dependence     Severe heroin dependence in sustained remission on maintenance therapy (H)     Narcotic withdrawal (H)     Nausea and vomiting      Current Medications and Allergies:   Current Outpatient Medications   Medication     amLODIPine (NORVASC) 10 MG tablet     cloNIDine (CATAPRES) 0.1 MG tablet     escitalopram (LEXAPRO) 20 MG tablet     gabapentin (NEURONTIN) 300 MG capsule     methadone HCl 10 MG/5ML SOLN     QUEtiapine (SEROQUEL) 100 MG tablet     No current facility-administered medications for this visit.

## 2020-07-20 NOTE — PROGRESS NOTES
Clinic Care Coordination Contact  [facility]/Voicemail    Clinical Data: Care Coordinator Outreach    Outreach attempted x 2.  Left message on patient's voicemail with call back information and requested return call.    Plan: Care Coordinator will send care coordination introduction letter with care coordinator contact information and explanation of care coordination services via e-mail. Care Coordinator will do no further outreaches at this time.    Tammi Rasmussen [facility]   Social Work Clinic Care Coordinator   [facility] and [facility]   PH: [phone]  [email or web address]

## 2020-07-22 ENCOUNTER — HOSPITAL ENCOUNTER (INPATIENT)
Facility: CLINIC | Age: 25
LOS: 5 days | Discharge: HOME | End: 2020-07-27
Attending: INTERNAL MEDICINE | Admitting: PSYCHIATRY & NEUROLOGY
Payer: COMMERCIAL

## 2020-07-22 ENCOUNTER — TELEPHONE (OUTPATIENT)
Dept: BEHAVIORAL HEALTH | Facility: CLINIC | Age: 25
End: 2020-07-22

## 2020-07-22 DIAGNOSIS — F13.20 BENZODIAZEPINE DEPENDENCE (H): ICD-10-CM

## 2020-07-22 DIAGNOSIS — F11.20 HEROIN DEPENDENCE (H): ICD-10-CM

## 2020-07-22 DIAGNOSIS — F19.20 CHEMICAL DEPENDENCY (H): ICD-10-CM

## 2020-07-22 DIAGNOSIS — F13.930 BENZODIAZEPINE WITHDRAWAL WITHOUT COMPLICATION (H): Primary | ICD-10-CM

## 2020-07-22 DIAGNOSIS — F11.10 HEROIN ABUSE (H): ICD-10-CM

## 2020-07-22 DIAGNOSIS — F11.20 UNCOMPLICATED OPIOID DEPENDENCE (H): ICD-10-CM

## 2020-07-22 DIAGNOSIS — F13.10 BENZODIAZEPINE ABUSE (H): ICD-10-CM

## 2020-07-22 DIAGNOSIS — F10.10 ALCOHOL ABUSE: ICD-10-CM

## 2020-07-22 DIAGNOSIS — Z20.822 COVID-19 RULED OUT BY LABORATORY TESTING: ICD-10-CM

## 2020-07-22 DIAGNOSIS — F43.10 PTSD (POST-TRAUMATIC STRESS DISORDER): ICD-10-CM

## 2020-07-22 PROBLEM — F13.939 BENZODIAZEPINE WITHDRAWAL (H): Status: ACTIVE | Noted: 2020-07-22

## 2020-07-22 LAB
ALBUMIN SERPL-MCNC: 3.6 G/DL (ref 3.4–5)
ALCOHOL BREATH TEST: 0 (ref 0–0.01)
ALP SERPL-CCNC: 183 U/L (ref 40–150)
ALT SERPL W P-5'-P-CCNC: 68 U/L (ref 0–70)
AMPHETAMINES UR QL SCN: NEGATIVE
ANION GAP SERPL CALCULATED.3IONS-SCNC: 11 MMOL/L (ref 3–14)
AST SERPL W P-5'-P-CCNC: 27 U/L (ref 0–45)
BARBITURATES UR QL: NEGATIVE
BASOPHILS # BLD AUTO: 0 10E9/L (ref 0–0.2)
BASOPHILS NFR BLD AUTO: 0.2 %
BENZODIAZ UR QL: NEGATIVE
BILIRUB SERPL-MCNC: 0.3 MG/DL (ref 0.2–1.3)
BUN SERPL-MCNC: 24 MG/DL (ref 7–30)
CALCIUM SERPL-MCNC: 8.6 MG/DL (ref 8.5–10.1)
CANNABINOIDS UR QL SCN: NEGATIVE
CHLORIDE SERPL-SCNC: 105 MMOL/L (ref 94–109)
CO2 SERPL-SCNC: 23 MMOL/L (ref 20–32)
COCAINE UR QL: NEGATIVE
CREAT SERPL-MCNC: 2.39 MG/DL (ref 0.66–1.25)
DIFFERENTIAL METHOD BLD: ABNORMAL
EOSINOPHIL # BLD AUTO: 0.8 10E9/L (ref 0–0.7)
EOSINOPHIL NFR BLD AUTO: 6.2 %
ERYTHROCYTE [DISTWIDTH] IN BLOOD BY AUTOMATED COUNT: 14.6 % (ref 10–15)
ETHANOL UR QL SCN: NEGATIVE
GFR SERPL CREATININE-BSD FRML MDRD: 36 ML/MIN/{1.73_M2}
GLUCOSE SERPL-MCNC: 125 MG/DL (ref 70–99)
HCT VFR BLD AUTO: 35.8 % (ref 40–53)
HGB BLD-MCNC: 12.1 G/DL (ref 13.3–17.7)
IMM GRANULOCYTES # BLD: 0.1 10E9/L (ref 0–0.4)
IMM GRANULOCYTES NFR BLD: 0.5 %
LABORATORY COMMENT REPORT: NORMAL
LYMPHOCYTES # BLD AUTO: 2.2 10E9/L (ref 0.8–5.3)
LYMPHOCYTES NFR BLD AUTO: 18.2 %
MCH RBC QN AUTO: 30 PG (ref 26.5–33)
MCHC RBC AUTO-ENTMCNC: 33.8 G/DL (ref 31.5–36.5)
MCV RBC AUTO: 89 FL (ref 78–100)
MONOCYTES # BLD AUTO: 0.9 10E9/L (ref 0–1.3)
MONOCYTES NFR BLD AUTO: 7.8 %
NEUTROPHILS # BLD AUTO: 8.1 10E9/L (ref 1.6–8.3)
NEUTROPHILS NFR BLD AUTO: 67.1 %
NRBC # BLD AUTO: 0 10*3/UL
NRBC BLD AUTO-RTO: 0 /100
OPIATES UR QL SCN: POSITIVE
PLATELET # BLD AUTO: 348 10E9/L (ref 150–450)
POTASSIUM SERPL-SCNC: 3.3 MMOL/L (ref 3.4–5.3)
PROT SERPL-MCNC: 7.5 G/DL (ref 6.8–8.8)
RBC # BLD AUTO: 4.03 10E12/L (ref 4.4–5.9)
SARS-COV-2 RNA SPEC QL NAA+PROBE: NEGATIVE
SARS-COV-2 RNA SPEC QL NAA+PROBE: NORMAL
SODIUM SERPL-SCNC: 139 MMOL/L (ref 133–144)
SPECIMEN SOURCE: NORMAL
SPECIMEN SOURCE: NORMAL
WBC # BLD AUTO: 12.1 10E9/L (ref 4–11)

## 2020-07-22 PROCEDURE — C9803 HOPD COVID-19 SPEC COLLECT: HCPCS | Performed by: INTERNAL MEDICINE

## 2020-07-22 PROCEDURE — 85025 COMPLETE CBC W/AUTO DIFF WBC: CPT | Performed by: INTERNAL MEDICINE

## 2020-07-22 PROCEDURE — 80320 DRUG SCREEN QUANTALCOHOLS: CPT | Performed by: INTERNAL MEDICINE

## 2020-07-22 PROCEDURE — 82075 ASSAY OF BREATH ETHANOL: CPT | Performed by: INTERNAL MEDICINE

## 2020-07-22 PROCEDURE — 99284 EMERGENCY DEPT VISIT MOD MDM: CPT | Mod: Z6 | Performed by: INTERNAL MEDICINE

## 2020-07-22 PROCEDURE — 99283 EMERGENCY DEPT VISIT LOW MDM: CPT | Performed by: INTERNAL MEDICINE

## 2020-07-22 PROCEDURE — 80053 COMPREHEN METABOLIC PANEL: CPT | Performed by: INTERNAL MEDICINE

## 2020-07-22 PROCEDURE — 12800008 ZZH R&B CD ADULT

## 2020-07-22 PROCEDURE — U0003 INFECTIOUS AGENT DETECTION BY NUCLEIC ACID (DNA OR RNA); SEVERE ACUTE RESPIRATORY SYNDROME CORONAVIRUS 2 (SARS-COV-2) (CORONAVIRUS DISEASE [COVID-19]), AMPLIFIED PROBE TECHNIQUE, MAKING USE OF HIGH THROUGHPUT TECHNOLOGIES AS DESCRIBED BY CMS-2020-01-R: HCPCS | Performed by: INTERNAL MEDICINE

## 2020-07-22 PROCEDURE — 80307 DRUG TEST PRSMV CHEM ANLYZR: CPT | Performed by: INTERNAL MEDICINE

## 2020-07-22 PROCEDURE — 25000132 ZZH RX MED GY IP 250 OP 250 PS 637: Performed by: INTERNAL MEDICINE

## 2020-07-22 RX ORDER — CLONIDINE HYDROCHLORIDE 0.1 MG/1
0.1 TABLET ORAL 3 TIMES DAILY
Status: DISCONTINUED | OUTPATIENT
Start: 2020-07-23 | End: 2020-07-23

## 2020-07-22 RX ORDER — ALUMINA, MAGNESIA, AND SIMETHICONE 2400; 2400; 240 MG/30ML; MG/30ML; MG/30ML
30 SUSPENSION ORAL EVERY 4 HOURS PRN
Status: DISCONTINUED | OUTPATIENT
Start: 2020-07-22 | End: 2020-07-27 | Stop reason: HOSPADM

## 2020-07-22 RX ORDER — QUETIAPINE FUMARATE 100 MG/1
100 TABLET, FILM COATED ORAL AT BEDTIME
Status: DISCONTINUED | OUTPATIENT
Start: 2020-07-22 | End: 2020-07-27 | Stop reason: HOSPADM

## 2020-07-22 RX ORDER — ESCITALOPRAM OXALATE 20 MG/1
20 TABLET ORAL DAILY
Status: DISCONTINUED | OUTPATIENT
Start: 2020-07-23 | End: 2020-07-27 | Stop reason: HOSPADM

## 2020-07-22 RX ORDER — HYDROXYZINE HYDROCHLORIDE 50 MG/1
50 TABLET, FILM COATED ORAL ONCE
Status: COMPLETED | OUTPATIENT
Start: 2020-07-22 | End: 2020-07-22

## 2020-07-22 RX ORDER — HYDROXYZINE HYDROCHLORIDE 25 MG/1
25 TABLET, FILM COATED ORAL EVERY 4 HOURS PRN
Status: DISCONTINUED | OUTPATIENT
Start: 2020-07-22 | End: 2020-07-27 | Stop reason: HOSPADM

## 2020-07-22 RX ORDER — ACETAMINOPHEN 325 MG/1
650 TABLET ORAL 2 TIMES DAILY PRN
Status: ON HOLD | COMMUNITY
End: 2020-07-27

## 2020-07-22 RX ORDER — IBUPROFEN 200 MG
400 TABLET ORAL 3 TIMES DAILY PRN
Status: ON HOLD | COMMUNITY
End: 2020-07-27

## 2020-07-22 RX ORDER — TRAZODONE HYDROCHLORIDE 50 MG/1
50 TABLET, FILM COATED ORAL
Status: DISCONTINUED | OUTPATIENT
Start: 2020-07-22 | End: 2020-07-23

## 2020-07-22 RX ORDER — ONDANSETRON 4 MG/1
4 TABLET, ORALLY DISINTEGRATING ORAL EVERY 6 HOURS PRN
Status: DISCONTINUED | OUTPATIENT
Start: 2020-07-22 | End: 2020-07-27 | Stop reason: HOSPADM

## 2020-07-22 RX ORDER — POLYETHYLENE GLYCOL 3350 17 G
2-4 POWDER IN PACKET (EA) ORAL
Status: DISCONTINUED | OUTPATIENT
Start: 2020-07-22 | End: 2020-07-27 | Stop reason: HOSPADM

## 2020-07-22 RX ORDER — BISACODYL 10 MG
10 SUPPOSITORY, RECTAL RECTAL DAILY PRN
Status: DISCONTINUED | OUTPATIENT
Start: 2020-07-22 | End: 2020-07-27 | Stop reason: HOSPADM

## 2020-07-22 RX ORDER — ACETAMINOPHEN 325 MG/1
650 TABLET ORAL EVERY 4 HOURS PRN
Status: DISCONTINUED | OUTPATIENT
Start: 2020-07-22 | End: 2020-07-27 | Stop reason: HOSPADM

## 2020-07-22 RX ORDER — LOPERAMIDE HCL 2 MG
2 CAPSULE ORAL 4 TIMES DAILY PRN
Status: DISCONTINUED | OUTPATIENT
Start: 2020-07-22 | End: 2020-07-27 | Stop reason: HOSPADM

## 2020-07-22 RX ORDER — GABAPENTIN 300 MG/1
300 CAPSULE ORAL 2 TIMES DAILY
Status: DISCONTINUED | OUTPATIENT
Start: 2020-07-22 | End: 2020-07-27 | Stop reason: HOSPADM

## 2020-07-22 RX ORDER — AMLODIPINE BESYLATE 10 MG/1
10 TABLET ORAL EVERY EVENING
Status: DISCONTINUED | OUTPATIENT
Start: 2020-07-23 | End: 2020-07-23

## 2020-07-22 RX ORDER — PHENOBARBITAL 32.4 MG/1
32.4 TABLET ORAL 3 TIMES DAILY
Status: DISCONTINUED | OUTPATIENT
Start: 2020-07-22 | End: 2020-07-23

## 2020-07-22 RX ORDER — NICOTINE 21 MG/24HR
1 PATCH, TRANSDERMAL 24 HOURS TRANSDERMAL DAILY
Status: DISCONTINUED | OUTPATIENT
Start: 2020-07-23 | End: 2020-07-27 | Stop reason: HOSPADM

## 2020-07-22 RX ADMIN — HYDROXYZINE HYDROCHLORIDE 50 MG: 50 TABLET, FILM COATED ORAL at 19:37

## 2020-07-22 ASSESSMENT — ACTIVITIES OF DAILY LIVING (ADL)
DRESS: 0-->INDEPENDENT
FALL_HISTORY_WITHIN_LAST_SIX_MONTHS: YES
RETIRED_EATING: 0-->INDEPENDENT
TOILETING: 0-->INDEPENDENT
HYGIENE/GROOMING: INDEPENDENT
SWALLOWING: 0-->SWALLOWS FOODS/LIQUIDS WITHOUT DIFFICULTY
DRESS: STREET CLOTHES;SCRUBS (BEHAVIORAL HEALTH);INDEPENDENT
NUMBER_OF_TIMES_PATIENT_HAS_FALLEN_WITHIN_LAST_SIX_MONTHS: 1
AMBULATION: 0-->INDEPENDENT
TRANSFERRING: 0-->INDEPENDENT
ORAL_HYGIENE: INDEPENDENT
COGNITION: 0 - NO COGNITION ISSUES REPORTED
RETIRED_COMMUNICATION: 0-->UNDERSTANDS/COMMUNICATES WITHOUT DIFFICULTY
BATHING: 0-->INDEPENDENT
LAUNDRY: WITH SUPERVISION

## 2020-07-22 ASSESSMENT — ENCOUNTER SYMPTOMS
FEVER: 0
COLOR CHANGE: 0
SHORTNESS OF BREATH: 0
NECK STIFFNESS: 0
HEADACHES: 0
DIFFICULTY URINATING: 0
EYE REDNESS: 0
ABDOMINAL PAIN: 0
CONFUSION: 0
TREMORS: 1
ARTHRALGIAS: 0

## 2020-07-22 ASSESSMENT — MIFFLIN-ST. JEOR: SCORE: 1630.83

## 2020-07-22 NOTE — TELEPHONE ENCOUNTER
S: [facility], 25/M, benzo detox     B: Pt reports using up to 2MG-8MG daily of xanex, STAN five days ago - pt reports feeling very shakey   Relapse about a month ago   Heroin up to 1G a day, STAN 2 days ago   Pt reports not drinking much, alcohol is not a problem, STAN 5 days ago   No hx of seizures or DTs     Medically cleared, eating, drinking, ambulates indep   Patient cleared and ready for behavioral bed placement: Yes   No covid concerns, swab to be ordered    A: Voluntary     R: 3A/Veluvali ??     734pm - All labs finalized, intake has a page out to Ciaran, on call provider  735pm - [facility] accepts   Pt placed in que   739pm - unit charge notified, 8pm for report   741pm - Ed notified via text page

## 2020-07-22 NOTE — ED NOTES
Performed safety screen. Removed Shoes, cigarettes (no lighter), two backpacks to security shelf. Filled out white board. AIDET.

## 2020-07-22 NOTE — ED NOTES
Sepsis BPA alert has fired and lactate was ordered No   If not, the reason was Dr. Lamb was notified and confirmed not sepsis.  Vitals 24 hr (last day)     Date/Time Temp Resp Pulse Pulse/Heart Rate Source BP    07/22/20 1729  98.4 (36.9)  16  138  Monitor  (!) 144/80            WBC   Date Value Ref Range Status   07/22/2020 12.1 (H) 4.0 - 11.0 10e9/L Final
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
## 2020-07-22 NOTE — ED NOTES
"Pt reports being here for detox. Wanting to detox from \"alcohol, heroine, and benzos.\" Pt is aware that the facility does not provide heroine detox. Pt reports taking 3-4 benzos a day, green bars (zanayx?), last use was 5 days ago. Pt would have a drink or two and has not had any in about 5 days. Last heroine use was about 2 days ago and Pt reports \"snorting a gram at a time\"  "

## 2020-07-22 NOTE — ED TRIAGE NOTES
Pt here for detox. Called ahead for a bed. Told to be here by 1745. Arrived at 1730. Last drink was 5 days ago. Been using opiates and benzos. Prescribed methadone but has not been taking it for the last three days.

## 2020-07-23 LAB
ALBUMIN SERPL-MCNC: 3.1 G/DL (ref 3.4–5)
ALP SERPL-CCNC: 167 U/L (ref 40–150)
ALT SERPL W P-5'-P-CCNC: 50 U/L (ref 0–70)
ANION GAP SERPL CALCULATED.3IONS-SCNC: 8 MMOL/L (ref 3–14)
AST SERPL W P-5'-P-CCNC: 18 U/L (ref 0–45)
BASOPHILS # BLD AUTO: 0 10E9/L (ref 0–0.2)
BASOPHILS NFR BLD AUTO: 0.2 %
BILIRUB SERPL-MCNC: 0.3 MG/DL (ref 0.2–1.3)
BUN SERPL-MCNC: 19 MG/DL (ref 7–30)
CALCIUM SERPL-MCNC: 8.7 MG/DL (ref 8.5–10.1)
CHLORIDE SERPL-SCNC: 107 MMOL/L (ref 94–109)
CO2 SERPL-SCNC: 25 MMOL/L (ref 20–32)
CREAT SERPL-MCNC: 1.37 MG/DL (ref 0.66–1.25)
DIFFERENTIAL METHOD BLD: ABNORMAL
EOSINOPHIL # BLD AUTO: 0.7 10E9/L (ref 0–0.7)
EOSINOPHIL NFR BLD AUTO: 7.2 %
ERYTHROCYTE [DISTWIDTH] IN BLOOD BY AUTOMATED COUNT: 14.7 % (ref 10–15)
GFR SERPL CREATININE-BSD FRML MDRD: 71 ML/MIN/{1.73_M2}
GLUCOSE SERPL-MCNC: 125 MG/DL (ref 70–99)
HCT VFR BLD AUTO: 33.6 % (ref 40–53)
HGB BLD-MCNC: 11.2 G/DL (ref 13.3–17.7)
IMM GRANULOCYTES # BLD: 0.1 10E9/L (ref 0–0.4)
IMM GRANULOCYTES NFR BLD: 0.9 %
LYMPHOCYTES # BLD AUTO: 2 10E9/L (ref 0.8–5.3)
LYMPHOCYTES NFR BLD AUTO: 21.8 %
MCH RBC QN AUTO: 29.6 PG (ref 26.5–33)
MCHC RBC AUTO-ENTMCNC: 33.3 G/DL (ref 31.5–36.5)
MCV RBC AUTO: 89 FL (ref 78–100)
MONOCYTES # BLD AUTO: 0.9 10E9/L (ref 0–1.3)
MONOCYTES NFR BLD AUTO: 9.6 %
NEUTROPHILS # BLD AUTO: 5.6 10E9/L (ref 1.6–8.3)
NEUTROPHILS NFR BLD AUTO: 60.3 %
NRBC # BLD AUTO: 0 10*3/UL
NRBC BLD AUTO-RTO: 0 /100
PLATELET # BLD AUTO: 332 10E9/L (ref 150–450)
POTASSIUM SERPL-SCNC: 3.4 MMOL/L (ref 3.4–5.3)
PROT SERPL-MCNC: 7 G/DL (ref 6.8–8.8)
RBC # BLD AUTO: 3.79 10E12/L (ref 4.4–5.9)
SODIUM SERPL-SCNC: 140 MMOL/L (ref 133–144)
WBC # BLD AUTO: 9.3 10E9/L (ref 4–11)

## 2020-07-23 PROCEDURE — 36415 COLL VENOUS BLD VENIPUNCTURE: CPT | Performed by: PHYSICIAN ASSISTANT

## 2020-07-23 PROCEDURE — 80053 COMPREHEN METABOLIC PANEL: CPT | Performed by: PHYSICIAN ASSISTANT

## 2020-07-23 PROCEDURE — 85025 COMPLETE CBC W/AUTO DIFF WBC: CPT | Performed by: PHYSICIAN ASSISTANT

## 2020-07-23 PROCEDURE — 12800008 ZZH R&B CD ADULT

## 2020-07-23 PROCEDURE — 99207 ZZC CONSULT E&M CHANGED TO SUBSEQUENT LEVEL: CPT | Performed by: PHYSICIAN ASSISTANT

## 2020-07-23 PROCEDURE — 99232 SBSQ HOSP IP/OBS MODERATE 35: CPT | Performed by: PHYSICIAN ASSISTANT

## 2020-07-23 PROCEDURE — 25000132 ZZH RX MED GY IP 250 OP 250 PS 637: Performed by: PSYCHIATRY & NEUROLOGY

## 2020-07-23 PROCEDURE — 99223 1ST HOSP IP/OBS HIGH 75: CPT | Mod: GT | Performed by: PSYCHIATRY & NEUROLOGY

## 2020-07-23 PROCEDURE — 25000132 ZZH RX MED GY IP 250 OP 250 PS 637: Performed by: PHYSICIAN ASSISTANT

## 2020-07-23 PROCEDURE — HZ2ZZZZ DETOXIFICATION SERVICES FOR SUBSTANCE ABUSE TREATMENT: ICD-10-PCS | Performed by: PSYCHIATRY & NEUROLOGY

## 2020-07-23 RX ORDER — LIDOCAINE 4 G/G
1-2 PATCH TOPICAL
Status: DISCONTINUED | OUTPATIENT
Start: 2020-07-23 | End: 2020-07-27 | Stop reason: HOSPADM

## 2020-07-23 RX ORDER — METHADONE HYDROCHLORIDE 5 MG/5ML
60 SOLUTION ORAL DAILY
Status: COMPLETED | OUTPATIENT
Start: 2020-07-23 | End: 2020-07-25

## 2020-07-23 RX ORDER — AMLODIPINE BESYLATE 10 MG/1
10 TABLET ORAL EVERY EVENING
Status: DISCONTINUED | OUTPATIENT
Start: 2020-07-23 | End: 2020-07-27 | Stop reason: HOSPADM

## 2020-07-23 RX ORDER — PHENOBARBITAL 64.8 MG/1
64.8 TABLET ORAL 3 TIMES DAILY
Status: COMPLETED | OUTPATIENT
Start: 2020-07-23 | End: 2020-07-25

## 2020-07-23 RX ORDER — CLONIDINE HYDROCHLORIDE 0.1 MG/1
0.1 TABLET ORAL 3 TIMES DAILY
Status: DISCONTINUED | OUTPATIENT
Start: 2020-07-23 | End: 2020-07-27 | Stop reason: HOSPADM

## 2020-07-23 RX ORDER — NALOXONE HYDROCHLORIDE 0.4 MG/ML
.1-.4 INJECTION, SOLUTION INTRAMUSCULAR; INTRAVENOUS; SUBCUTANEOUS
Status: DISCONTINUED | OUTPATIENT
Start: 2020-07-23 | End: 2020-07-27 | Stop reason: HOSPADM

## 2020-07-23 RX ADMIN — PHENOBARBITAL 64.8 MG: 64.8 TABLET ORAL at 20:16

## 2020-07-23 RX ADMIN — NICOTINE 1 PATCH: 14 PATCH, EXTENDED RELEASE TRANSDERMAL at 08:52

## 2020-07-23 RX ADMIN — AMLODIPINE BESYLATE 10 MG: 10 TABLET ORAL at 00:39

## 2020-07-23 RX ADMIN — CLONIDINE HYDROCHLORIDE 0.1 MG: 0.1 TABLET ORAL at 08:53

## 2020-07-23 RX ADMIN — CLONIDINE HYDROCHLORIDE 0.1 MG: 0.1 TABLET ORAL at 14:23

## 2020-07-23 RX ADMIN — AMLODIPINE BESYLATE 10 MG: 10 TABLET ORAL at 20:16

## 2020-07-23 RX ADMIN — PHENOBARBITAL 32.4 MG: 32.4 TABLET ORAL at 08:53

## 2020-07-23 RX ADMIN — CLONIDINE HYDROCHLORIDE 0.1 MG: 0.1 TABLET ORAL at 20:16

## 2020-07-23 RX ADMIN — LIDOCAINE 1 PATCH: 560 PATCH PERCUTANEOUS; TOPICAL; TRANSDERMAL at 10:37

## 2020-07-23 RX ADMIN — METHADONE HYDROCHLORIDE 60 MG: 5 SOLUTION ORAL at 10:20

## 2020-07-23 RX ADMIN — CLONIDINE HYDROCHLORIDE 0.1 MG: 0.1 TABLET ORAL at 00:39

## 2020-07-23 RX ADMIN — GABAPENTIN 300 MG: 300 CAPSULE ORAL at 08:53

## 2020-07-23 RX ADMIN — GABAPENTIN 300 MG: 300 CAPSULE ORAL at 20:16

## 2020-07-23 RX ADMIN — PHENOBARBITAL 32.4 MG: 32.4 TABLET ORAL at 00:19

## 2020-07-23 RX ADMIN — ESCITALOPRAM OXALATE 20 MG: 20 TABLET ORAL at 08:53

## 2020-07-23 RX ADMIN — GABAPENTIN 300 MG: 300 CAPSULE ORAL at 00:19

## 2020-07-23 RX ADMIN — QUETIAPINE FUMARATE 100 MG: 100 TABLET ORAL at 21:10

## 2020-07-23 RX ADMIN — QUETIAPINE FUMARATE 100 MG: 100 TABLET ORAL at 00:19

## 2020-07-23 RX ADMIN — ACETAMINOPHEN 650 MG: 325 TABLET, FILM COATED ORAL at 12:23

## 2020-07-23 RX ADMIN — PHENOBARBITAL 64.8 MG: 64.8 TABLET ORAL at 14:23

## 2020-07-23 ASSESSMENT — ACTIVITIES OF DAILY LIVING (ADL)
HYGIENE/GROOMING: INDEPENDENT
ORAL_HYGIENE: INDEPENDENT
DRESS: INDEPENDENT

## 2020-07-23 NOTE — PROGRESS NOTES
"CLINICAL NUTRITION SERVICES - ASSESSMENT NOTE     Nutrition Prescription    RECOMMENDATIONS FOR MDs/PROVIDERS TO ORDER:  Consider ordering Folic Acid, Thiamine, and Theravit-M d/t alcohol use    Malnutrition Status:    Unable to determine    Recommendations already ordered by Registered Dietitian (RD):  None today    Future/Additional Recommendations:  If intakes decline, consider sending Boost or snacks     Unable to obtain in-person nutrition history or nutrition focused physical assessment (NFPA) from patient as the number of staff going into rooms is restricted to limit pt exposure during this time. Information obtained via chart review and speaking with RN via phone.     PMH of alcohol abuse, tobacco abuse, heroin abuse, alcohol withdrawal, severe heroin dependence, moderate recurrent MDD, CKD, and anxiety    REASON FOR ASSESSMENT  Jaime Mccormick is a/an 25 year old male assessed by the dietitian for Admission Nutrition Risk Screen for reduced oral intake over the last month    NUTRITION HISTORY  Pt was resting and unable to visit via phone.    CURRENT NUTRITION ORDERS  Diet: Regular  Intake/Tolerance: per RN, pt ate well at lunch.      LABS  Labs reviewed  - Creatinine 1.37 (H)    MEDICATIONS  Medications reviewed    ANTHROPOMETRICS  Ht Readings from Last 1 Encounters:   07/22/20 1.676 m (5' 6\")   Most Recent Weight: 70.3 kg (155 lb)(pt estimate)  IBW: 64.5 kg (109% IBW)  BMI: Overweight BMI 25-29.9  Weight History: Patient has lost 5 lbs (3%) over the last 1 month  Wt Readings from Last 10 Encounters:   07/22/20 70.3 kg (155 lb)   06/16/20 72.6 kg (160 lb)   01/31/20 76.9 kg (169 lb 8 oz)   12/31/19 75.3 kg (166 lb)   12/05/19 73.5 kg (162 lb)   11/14/19 74.4 kg (164 lb)   10/10/19 68.9 kg (151 lb 12.8 oz)   10/01/19 69.2 kg (152 lb 9.6 oz)   09/03/19 66 kg (145 lb 6.4 oz)   07/09/19 62.3 kg (137 lb 6.4 oz)     Dosing Weight: 70 kg - admit wt    ASSESSED NUTRITION NEEDS  Estimated Energy Needs: " 5119-5130 kcals/day (25 - 30 kcals/kg)  Justification: Maintenance  Estimated Protein Needs: 56-70 grams protein/day (0.8 - 1.0 grams of pro/kg)  Justification: CKD/Maintenance   Estimated Fluid Needs: 1 mL/kcal  Justification: Per provider pending fluid status    PHYSICAL FINDINGS  See malnutrition section below.    MALNUTRITION  % Intake: Unable to assess  % Weight Loss: Up to 5% in 1 month (non-severe)  Subcutaneous Fat Loss: Unable to assess  Muscle Loss: Unable to assess  Fluid Accumulation/Edema: None noted  Malnutrition Diagnosis: Unable to determine    NUTRITION DIAGNOSIS  Predicted inadequate protein-energy intake related to variable appetite as evidenced by pt reliant on PO intakes to meet 100% of nutritional needs with potential for variation        INTERVENTIONS  Implementation  Discussed intakes with RN as pt was unavailable.      Goals  Patient to consume % of nutritionally adequate meal trays TID, or the equivalent with supplements/snacks.     Monitoring/Evaluation  Progress toward goals will be monitored and evaluated per protocol.    Alexandria Wellington RD, LD  Unit Pager: [phone]

## 2020-07-23 NOTE — PLAN OF CARE
Behavioral Team Discussion: (7/23/2020)    Continued Stay Criteria/Rationale: Patient admitted for benzodiazepine withdrawal, complicated by comorbid heroin use.  Plan: The following services will be provided to the patient; psychiatric assessment, medication management, therapeutic milieu, individual and group support, and skills groups.   Participants: 3A Provider: Dr. Aidee Douglas MD; 3A RN's: Laverne Ventura, RN; 3A CM's: Jada Suresh [facility]  Summary/Recommendation: Providers will assess today for treatment recommendations, discharge planning, and aftercare plans. CM will meet with pt for discharge planning.   Medical/Physical: Internal medicine consult to be completed 7/23/2020.  Precautions:   Behavioral Orders   Procedures     Code 1 - Restrict to Unit     Routine Programming     As clinically indicated     Status 15     Every 15 minutes.     Withdrawal precautions     Rationale for change in precautions or plan: N/A  Progress: No Change.

## 2020-07-23 NOTE — PROGRESS NOTES
"SPIRITUAL HEALTH SERVICES: Tele-Encounter  Patient Location ([facility], [facility], Unit): [facility], [facility] [place], [place], 3AW  Spoke with (patient, family relationship): patientFidencio      Referral Source: patient/family request at admission for chaplaincy support    If applicable: patient was appropriately screened for telechaplaincy support with bedside nurse prior to visit (e.g. Mental Health and Addiction contexts). See call details below.    DATA:  10 minute telephone conversation with pt Fidencio who shared that he wasn't really sure why he specifically asked to talk to a  other than \"I think that spiritual piece is missing in my life and it's why I use.\" He noted that he used to love to camp and fish and just be outdoors \"and participate in the day rather than just watching it go by.\" He noticed a shift in his teens when he stopped doing that and started using substances. We reflected briefly on some ways he could start with some spiritual reflection about how he would like to reconnect with some of these places of spiritual inspiration in his life. He agreed he would be open to another tele-visit from chaplaincy in the coming days.     PLAN:  Refer to chaplain [place] for potential follow up this weekend as time allows.    Yoselyn Lubin MDiv, [facility]  Staff  & Lead  for Adult Mental Health and Addiction  Pager: [phone]    ______________________________    Type of service:  Telephone Visit     has received verbal consent for a TelephoneVisit from the patient? Yes    Distance Provider Location: designated [place] office or home office (secure setting)    Mode of Communication: telephone (via [employer] phone or [employer] tele-call-number ([phone]))      "

## 2020-07-23 NOTE — H&P
Telemedicine Visit: The patient's condition can be safely assessed and treated via synchronous audio and visual telemedicine encounter.   Start Time: 9.07  Stop Time: 9.32  Reason for Telemedicine Visit: Covid-19   Originating Site (Patient Location): Station 3aw  Distant Site (Provider Location): Provider Remote Setting   Consent: The patient/guardian has verbally consented to: the potential risks and benefits of telemedicine (video visit) versus in person care; bill my insurance or make self-payment for services provided; and responsibility for payment of non-covered services.   Mode of Communication: Video Conference via [employer]  As the provider I attest to compliance with applicable laws and regulations related to telemedicine.       The patient/guardian has been notified of the following:   This telemedicine visit is conducted live between you and your clinician. We have found that certain health care needs can be provided without the need for a physical exam. This service lets us provide the care you need with a telemedicine conversation.
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
## 2020-07-23 NOTE — PLAN OF CARE
Continues in detox status on opiate and benzodiazepine withdrawal. Cows scores 8 and 3. MSSA scores  6 and 4. Low energy. Appetite improving. Fluids encouraged. Denies suicide ideation and thoughts of self harm. Will continue to monitor.
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
## 2020-07-23 NOTE — PROGRESS NOTES
Contacted [facility] in [place] for recommendations for Methadone dosing. Spoke with nurse Fernández who reported that Jaime had not been dosed by them for 13 days. They recommend 60 mg. x3 days and 70 mg.on day 4. Patient will need to return to the clinic after discharge from the hospital to continue Methadone maintenance dose.

## 2020-07-23 NOTE — ED NOTES
ED to Behavioral Floor Handoff    SITUATION  Jaime Mccormick is a 25 year old male who speaks English and lives in a home alone The patient arrived in the ED by private car from home with a complaint of detox (Called ahead for a bed)  .The patient's current symptoms started/worsened 1 week(s) ago and during this time the symptoms have increased.   In the ED, pt was diagnosed with   Final diagnoses:   Benzodiazepine dependence (H)   Benzodiazepine abuse (H)   Alcohol abuse   Heroin dependence (H)   Heroin abuse (H)        Initial vitals were: BP: (!) 144/80  Pulse: 138  Temp: 98.4  F (36.9  C)  Resp: 16  SpO2: 98 %   --------  Is the patient diabetic? No   If yes, last blood glucose? --     If yes, was this treated in the ED? --  --------  Is the patient inebriated (ETOH) No or Impaired on other substances? Yes  MSSA done? Yes  Last MSSA score: 12    Were withdrawal symptoms treated? Atarax given  Does the patient have a seizure history? No. If yes, date of most recent seizure--  --------  Is the patient patient experiencing suicidal ideation? denies current or recent suicidal ideation     Homicidal ideation? denies current or recent homicidal ideation or behaviors.    Self-injurious behavior/urges? denies current or recent self injurious behavior or ideation.  ------  Was pt aggressive in the ED No  Was a code called No  Is the pt now cooperative? Yes  -------  Meds given in ED:   Medications   hydrOXYzine (ATARAX) tablet 50 mg (50 mg Oral Given 7/22/20 1937)      Family present during ED course? No  Family currently present? No    BACKGROUND  Does the patient have a cognitive impairment or developmental disability? No  Allergies:   Allergies   Allergen Reactions     Amoxicillin Hives     Metoclopramide Hives   .   Social demographics are   Social History     Socioeconomic History     Marital status: Single     Spouse name: None     Number of children: None     Years of education: None     Highest education  level: None   Occupational History     None   Social Needs     Financial resource strain: None     Food insecurity     Worry: None     Inability: None     Transportation needs     Medical: None     Non-medical: None   Tobacco Use     Smoking status: Current Every Day Smoker     Packs/day: 0.50     Smokeless tobacco: Never Used   Substance and Sexual Activity     Alcohol use: Yes     Comment: social     Drug use: Yes     Frequency: 7.0 times per week     Types: Opiates, Benzodiazepines     Sexual activity: Not Currently   Lifestyle     Physical activity     Days per week: None     Minutes per session: None     Stress: None   Relationships     Social connections     Talks on phone: None     Gets together: None     Attends [employer] service: None     Active member of club or organization: None     Attends meetings of clubs or organizations: None     Relationship status: None     Intimate partner violence     Fear of current or ex partner: None     Emotionally abused: None     Physically abused: None     Forced sexual activity: None   Other Topics Concern     None   Social History Narrative     None        ASSESSMENT  Labs results   Labs Ordered and Resulted from Time of ED Arrival Up to the Time of Departure from the ED   CBC WITH PLATELETS DIFFERENTIAL - Abnormal; Notable for the following components:       Result Value    WBC 12.1 (*)     RBC Count 4.03 (*)     Hemoglobin 12.1 (*)     Hematocrit 35.8 (*)     Absolute Eosinophils 0.8 (*)     All other components within normal limits   COMPREHENSIVE METABOLIC PANEL - Abnormal; Notable for the following components:    Potassium 3.3 (*)     Glucose 125 (*)     Creatinine 2.39 (*)     GFR Estimate 36 (*)     GFR Estimate If Black 42 (*)     Alkaline Phosphatase 183 (*)     All other components within normal limits   DRUG ABUSE SCREEN 6 CHEM DEP URINE ([facility]) - Abnormal; Notable for the following components:    Opiates Qualitative Urine Positive (*)     All other components  within normal limits   ALCOHOL BREATH TEST POCT - Normal   COVID-19 VIRUS (CORONAVIRUS) BY PCR   SARS-COV-2 (COVID-19) VIRUS RT-PCR      Imaging Studies: No results found for this or any previous visit (from the past 24 hour(s)).   Most recent vital signs BP (!) 144/80   Pulse 138   Temp 98.4  F (36.9  C) (Oral)   Resp 16   SpO2 98%    Abnormal labs/tests/findings requiring intervention:---   Pain control: pt had none  Nausea control: pt had none    RECOMMENDATION  Are any infection precautions needed (MRSA, VRE, etc.)? No If yes, what infection? --  ---  Does the patient have mobility issues? independently. If yes, what device does the pt use? ---  ---  Is patient on 72 hour hold or commitment? No If on 72 hour hold, have hold and rights been given to patient? N/A  Are admitting orders written if after 10 p.m. ?No  Tasks needing to be completed:---     Parminder Galeana RN   ascom-- N/A   [phone] [facility]   [phone] [facility]

## 2020-07-23 NOTE — PROGRESS NOTES
Case Management Note  7/23/2020    Met with pt to discuss discharge planning. Pt hopes to attend [facility]. Pt last completed a Rule 25 over a year ago. Showed pt intake paperwork and coached pt to complete as soon as he felt able. Pt in need of Rule 25, [facility] funding.     Jada Suresh LPC, [place]
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
## 2020-07-23 NOTE — PROGRESS NOTES
07/22/20 1196   Patient Belongings   Did you bring any home meds/supplements to the hospital?  Yes   Disposition of meds  Sent to security/pharmacy per site process   Patient Belongings locker;returned to patient at discharge;other (see comments)   Patient Belongings Put in Hospital Secure Location (Security or Locker, etc.) other (see comments)   Belongings Search Yes   Clothing Search Yes   Second Staff Curt RON, [place] RN,   Comment see note   Storage Bin   2x backpacks w/clothing  Medical Bin   Cell phone,  and cigarette  Security Envelope  Nothing  A             Admission:  I am responsible for any personal items that are not sent to the safe or pharmacy.  [facility] is not responsible for loss, theft or damage of any property in my possession.  Signature:  _________________________________ Date: _______  Time: _____                                              Staff Signature:  ____________________________ Date: ________  Time: _____      2nd Staff person, if patient is unable/unwilling to sign:    Signature: ________________________________ Date: ________  Time: _____   Discharge:  [facility] has returned all of my personal belongings:  Signature: _________________________________ Date: ________  Time: _____                                          Staff Signature:  ____________________________ Date: ________  Time: _____

## 2020-07-23 NOTE — PHARMACY
"Outpatient Methadone Dosing Information     Clinic: [facility]  Location: [place], [place]  Phone: [phone]      Dose: 120 mg daily  Last dose: last dosed in clinic on 7/9/20, per pt he has been getting methadone \"from a friend\" and last had a dose of 120 mg on 7/19/20     Does patient receive take-home doses?: no      Carolina Winchester, Pharm.D.      "

## 2020-07-23 NOTE — PROGRESS NOTES
PDMP as of 7/23/2020:     Jaime Mccormick     NARX SCORES  Narcotic  310  Sedative  130  Stimulant  000  Explanation and Guidance  OVERDOSE RISK SCORE     460    (Range 000-999)    ADDITIONAL RISK INDICATORS ( 0 )      This [facility]xCare report is based on search criteria supplied and the data entered by the dispensing pharmacy. For more information about any prescription, please contact the dispensing pharmacy or the prescriber. NarxCare scores and reports are intended to aid, not replace, medical decision making. None of the information presented should be used as sole justification for providing or refusing to provide medications. The information on this report is not warranted as accurate or complete.   Graphs   RX GRAPH  Narcotic Buprenorphine Sedative Stimulant Other     All Prescribers    Prescribers    5 - Sidney Tavera,    4 - Nickie Gan    3 - Mireille Womack MD    2 - Noah Pandey,     1 - John Elmore,    *Per CDC guidance, the MME conversion factors prescribed or provided as part of the medication-assisted treatment for opioid use disorder should not be used to benchmark against dosage thresholds meant for opioids prescribed for pain. Buprenorphine products have no agreed upon morphine equivalency, and as partial opioid agonists, are not expected to be associated with overdose risk in the same dose-dependent manner as doses for full agonist opioids. MME = morphine milligram equivalents. LME = Lorazepam milligram equivalents. mg = dose in milligrams.     Summary     Summary  Total Prescriptions:     15   Total Prescribers:     5   Total Pharmacies:     5   Narcotics* (excluding Buprenorphine)  Current Qty:     0   Current MME/day:     0.00  30 Day Avg MME/day:     0.00   Sedatives*  Current Qty:     0   Current LME/day:    0.00  30 Day Avg LME/day:    0.00  Buprenorphine*  Current Qty:     0   Current mg/day:    0.00  30 Day Avg mg/day:    0.00  Rx Data   PRESCRIPTIONS  Total  Prescriptions:  15  Total Private Pay:  2  Fill Date  ID  Written  Drug  Qty  Days  Prescriber  Rx #  Pharmacy  Refill  Daily Dose *  Pymt Type    05/20/2020   1 *   05/13/2020   Gabapentin 300 Mg Capsule   90.00  30  Ng [facility]   [id number]   Gra ([id number])   0/0    [place]  05/13/2020   1 *   05/13/2020   Gabapentin 300 Mg Capsule   90.00  30  Ng [facility]   [id number]   Wal (0123)   0/2    [place]  03/31/2020   1 *   03/31/2020   Gabapentin 300 Mg Capsule   90.00  30  Ng [facility]   [id number]   Sup ([id number])   0/0    [place]icaid [place]  02/26/2020   1 *   02/18/2020   Gabapentin 300 Mg Capsule   90.00  30  Ng [facility]   [id number]   Gra ([id number])   0/0    [place]  02/18/2020   1   02/18/2020   Buprenorphin-Naloxon 8-2 Mg Sl   90.00  30  Ng [facility]   [id number]   Wal (0123)   0/1  24.00 mg  [place]  02/18/2020   1 *   02/18/2020   Gabapentin 300 Mg Capsule   90.00  30  Ng [facility]   [id number]   Wal (0123)   0/1    [place]  02/10/2020   1 *   02/10/2020   Gabapentin 300 Mg Capsule   45.00  15  Fo [facility]   [id number]   Sup ([id number])   0/0    [place]  02/03/2020   2 *   02/03/2020   Gabapentin 300 Mg Capsule   45.00  15  [facility]   [id number]   Hen (2149)   0/0    [place]  01/31/2020   2 *   01/31/2020   Gabapentin 100 Mg Capsule   21.00  7  Ta Her   [id number]   Hen (2149)   0/0    [place]  01/09/2020   2   12/05/2019   Buprenorphin-Naloxon 8-2 Mg Sl   90.00  30  Di Michael   [id number]   Jacobo ([id number])   1/1  24.00 mg  [place]  12/05/2019   2   12/05/2019   Buprenorphin-Naloxon 8-2 Mg Sl   90.00  30  Di Michael   [id number]   [place]obo (7862)   0/1  24.00 mg  [place]  11/04/2019   2   11/04/2019   Buprenorphin-Naloxon 8-2 Mg Sl   90.00  30  Ba Rit   [id number]   [place] ([id number])   0/0  24.00 mg  [place]  10/02/2019   2   10/01/2019   Buprenorphin-Naloxon 8-2 Mg Sl   90.00  30  Di Michael   [id number]   [place]obo ([id number])   0/0  24.00 mg  [place]  09/04/2019   2   09/03/2019   Buprenorphin-Naloxon 8-2 Mg Sl   90.00  30  Di Michael   [id number]   [place]  (8305)   0/0  24.00 mg  [place]  08/22/2019   2   08/22/2019   Buprenorphin-Naloxon 8-2 Mg Sl   45.00  15  Di Michael   [id number]   [place] (9189)   0/0  24.00 mg  [place]icaid [place]  *Per CDC guidance, the MME conversion factors prescribed or provided as part of the medication-assisted treatment for opioid use disorder should not be used to benchmark against dosage thresholds meant for opioids prescribed for pain. Buprenorphine products have no agreed upon morphine equivalency, and as partial opioid agonists, are not expected to be associated with overdose risk in the same dose-dependent manner as doses for full agonist opioids. MME = morphine milligram equivalents. LME = Lorazepam milligram equivalents. mg = dose in milligrams.     Providers  Total Providers: 5   Name   Address   City   State   Zipcode   Phone   John Elmore MD  [address] [facility]  [address]  [phone]  oNah Pandey MD  [address] [facility]  [address] [phone]  Mireille Womack MD  [address] [place]  [phone]  Nickie Alvarez, Tessa  [address] [facility] [address]   [facility]  [address] [phone]  Sidney Tavera Do  [address] [facility]  [phone]  Pharmacies  Total Pharmacies: 5   Name   Address   City   State   Zipcode   Phone   [facility] (4067) [address] [place]  [phone]  [facility] (1243)  [address]   [facility]  [phone]  [employer] (8488) [address] [facility]  [address] [phone]  [facility] (7766)  [address] [facility]  [phone]  [facility] (5796)  [address] [place] [address] Pharmacy   [facility]  [phone]    The report provided is based upon the search criteria entered and the corresponding data as it has been reported  by dispenser(s). If erroneous information is identified or additional information is needed, please contact the dispenser or the prescriber provided on the report. Note, federal regulation (CFR Title 42: Part 2) prevents federally funded opioid treatment programs (OTPs) from releasing certain patient data. As such, controlled substances dispensed from OTPs for medication-assisted treatment will not appear in the [place]  report. Morphine milligram equivalent (MME) conversion factors published by the CDC are used in the MME calculation. Per the CDC, the MME conversion factor is intended only for analytic purposes where prescription data are used to retrospectively calculate daily MME to inform analyses of risks associated with opioid prescribing. This value does not constitute clinical guidance or recommendations for converting patients from one form of opioid analgesic to another. Per the CDC, the conversion factors for drugs prescribed or provided, as part of medication-assisted treatment for opioid use disorder should not be used to benchmark against MME dosage thresholds meant for opioids prescribed for pain. Buprenorphine products listed in the CDC s MME file do not have an associated conversion factor. Lastly, the CDC notes, in clinical practice, calculating MME for methadone often involves a sliding-scale approach, whereby the conversion factor increases with increasing dose. The conversion factor of 3 for methadone presented in this file could underestimate MME for a given patient. This report contains confidential information, including patient identifiers, and is not a public record. The information on this report must be treated as protected health information and is only to be disclosed to others as authorized by applicable state and Federal regulations.           Powered By       [place] Prescription Monitoring Program  [place] Board of [employer]acy  [address]  [place] [address]  1  [phone]     [employer] 2020. All Rights Reserved. Privacy Policy

## 2020-07-23 NOTE — PROGRESS NOTES
Pt given scheduled medications at 0019. Slept 5.25 hour during the shift. Noted that pt's potassium was 3.3 in the ED on 7/22 at 1758, but not replaced. Note left for provider and writer requested day staff follow up.

## 2020-07-24 LAB
ALBUMIN SERPL-MCNC: 3 G/DL (ref 3.4–5)
ALP SERPL-CCNC: 155 U/L (ref 40–150)
ALT SERPL W P-5'-P-CCNC: 39 U/L (ref 0–70)
ANION GAP SERPL CALCULATED.3IONS-SCNC: 6 MMOL/L (ref 3–14)
AST SERPL W P-5'-P-CCNC: 17 U/L (ref 0–45)
BILIRUB SERPL-MCNC: 0.3 MG/DL (ref 0.2–1.3)
BUN SERPL-MCNC: 17 MG/DL (ref 7–30)
CALCIUM SERPL-MCNC: 8.9 MG/DL (ref 8.5–10.1)
CHLORIDE SERPL-SCNC: 104 MMOL/L (ref 94–109)
CO2 SERPL-SCNC: 29 MMOL/L (ref 20–32)
CREAT SERPL-MCNC: 1.07 MG/DL (ref 0.66–1.25)
GFR SERPL CREATININE-BSD FRML MDRD: >90 ML/MIN/{1.73_M2}
GLUCOSE SERPL-MCNC: 89 MG/DL (ref 70–99)
POTASSIUM SERPL-SCNC: 3.3 MMOL/L (ref 3.4–5.3)
PROT SERPL-MCNC: 6.7 G/DL (ref 6.8–8.8)
SODIUM SERPL-SCNC: 139 MMOL/L (ref 133–144)

## 2020-07-24 PROCEDURE — 25000132 ZZH RX MED GY IP 250 OP 250 PS 637: Performed by: PSYCHIATRY & NEUROLOGY

## 2020-07-24 PROCEDURE — 25000132 ZZH RX MED GY IP 250 OP 250 PS 637: Performed by: PHYSICIAN ASSISTANT

## 2020-07-24 PROCEDURE — 12800008 ZZH R&B CD ADULT

## 2020-07-24 PROCEDURE — 99233 SBSQ HOSP IP/OBS HIGH 50: CPT | Mod: GT | Performed by: PSYCHIATRY & NEUROLOGY

## 2020-07-24 PROCEDURE — 80053 COMPREHEN METABOLIC PANEL: CPT | Performed by: PHYSICIAN ASSISTANT

## 2020-07-24 PROCEDURE — 36415 COLL VENOUS BLD VENIPUNCTURE: CPT | Performed by: PHYSICIAN ASSISTANT

## 2020-07-24 RX ORDER — POTASSIUM CHLORIDE 750 MG/1
40 TABLET, EXTENDED RELEASE ORAL ONCE
Status: COMPLETED | OUTPATIENT
Start: 2020-07-24 | End: 2020-07-24

## 2020-07-24 RX ORDER — PHENOBARBITAL 32.4 MG/1
32.4 TABLET ORAL 2 TIMES DAILY
Status: DISCONTINUED | OUTPATIENT
Start: 2020-07-26 | End: 2020-07-27

## 2020-07-24 RX ORDER — METHADONE HYDROCHLORIDE 5 MG/5ML
70 SOLUTION ORAL DAILY
Status: DISCONTINUED | OUTPATIENT
Start: 2020-07-26 | End: 2020-07-27 | Stop reason: HOSPADM

## 2020-07-24 RX ADMIN — CLONIDINE HYDROCHLORIDE 0.1 MG: 0.1 TABLET ORAL at 20:12

## 2020-07-24 RX ADMIN — PHENOBARBITAL 64.8 MG: 64.8 TABLET ORAL at 08:21

## 2020-07-24 RX ADMIN — GABAPENTIN 300 MG: 300 CAPSULE ORAL at 08:21

## 2020-07-24 RX ADMIN — AMLODIPINE BESYLATE 10 MG: 10 TABLET ORAL at 20:12

## 2020-07-24 RX ADMIN — CLONIDINE HYDROCHLORIDE 0.1 MG: 0.1 TABLET ORAL at 14:11

## 2020-07-24 RX ADMIN — GABAPENTIN 300 MG: 300 CAPSULE ORAL at 20:12

## 2020-07-24 RX ADMIN — ESCITALOPRAM OXALATE 20 MG: 20 TABLET ORAL at 08:21

## 2020-07-24 RX ADMIN — PHENOBARBITAL 64.8 MG: 64.8 TABLET ORAL at 20:12

## 2020-07-24 RX ADMIN — NICOTINE 1 PATCH: 14 PATCH, EXTENDED RELEASE TRANSDERMAL at 08:21

## 2020-07-24 RX ADMIN — QUETIAPINE FUMARATE 100 MG: 100 TABLET ORAL at 21:20

## 2020-07-24 RX ADMIN — METHADONE HYDROCHLORIDE 60 MG: 5 SOLUTION ORAL at 08:20

## 2020-07-24 RX ADMIN — PHENOBARBITAL 64.8 MG: 64.8 TABLET ORAL at 14:11

## 2020-07-24 RX ADMIN — POTASSIUM CHLORIDE 40 MEQ: 750 TABLET, EXTENDED RELEASE ORAL at 11:09

## 2020-07-24 RX ADMIN — ACETAMINOPHEN 650 MG: 325 TABLET, FILM COATED ORAL at 21:20

## 2020-07-24 RX ADMIN — CLONIDINE HYDROCHLORIDE 0.1 MG: 0.1 TABLET ORAL at 08:21

## 2020-07-24 ASSESSMENT — ACTIVITIES OF DAILY LIVING (ADL)
ORAL_HYGIENE: INDEPENDENT
LAUNDRY: WITH SUPERVISION
DRESS: INDEPENDENT
HYGIENE/GROOMING: INDEPENDENT

## 2020-07-24 NOTE — PROVIDER NOTIFICATION
Text page to medicine regarding patients sutures left elbow that have been in place x 13 days per pt report.

## 2020-07-24 NOTE — PROGRESS NOTES
Faxed ADRIÁN to [facility].   Spoke with Odalys WASSERMAN at [facility] and she reports patient is to receive 60 mg methadone x 3 days as earlier reported and then increase to 70 mg methadone and then stay at 70 mg methadone daily and not to go any higher than 70 mg methadone daily. Informed Odalys that patient would be discharged next week with plans to return to [facility]. It is recommended that Fidencio call [facility] when he will be discharged.
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
## 2020-07-24 NOTE — PROGRESS NOTES
Case Management Note  7/24/2020     Checked in with pt this AM to see if he had completed or worked on his intake paperwork. Pt reported he had not yet worked on it but reported he would get it to writer ASAP. Explained to pt that writer would need it this AM to complete Rule 25 today and get referral started to [facility], otherwise weekend case management may be able to follow up but this will delay when pt can get on [facility]'s waitlist and will worsen our likelihood of getting a bed for him before discharge.     Addendum: Checked in with pt this afternoon, he was watching TV and partially through paperwork. Pt encouraged to get paperwork done by this evening and turned in so that weekend case management can help him this weekend if they are able.     Weekend CM: Please check in with pt, complete Rule 25 if intake paperwork is done, and refer to [facility]. Writer will follow up Monday to complete funding request.     [facility]:   Phone: [phone]  Fax: [phone]    Jada Suresh LPC, [facility]
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
## 2020-07-24 NOTE — PLAN OF CARE
Problem: Substance Withdrawal  Goal: Substance Withdrawal  Description: Signs and symptoms of listed problems will be absent or manageable.  Outcome: Improving   1) Patient will achieve medical stabilization of acute withdrawal sx.  2) Patient will remain safe and free from injury  3) Patient will demonstrate improvement of ADLs (appetite, hygiene)  4) Verbalize reduction of fear or anxiety to a manageable level.  5) Verbalize knowledge of substance abuse as a disease  6) Verbalize risks and negative effects related to drug ingestion  7) Demonstrate participation in unit programming and attends specific substance use group therapy (i.e AA meetings)  8) Accept referral to substance abuse treatment  9) Express sense of regaining some control of situation/life (possible by verbalizing alternative coping mechanisms as alternatives to substance use in response to stress)  Pt continues on methadone 60 mg. Methadone will increase to 7- to 70 mg methadone daily.     Minimal withdrawal symptoms reported.     Pt continues on scheduled phenobarbital.     Pt given potassium replacement per orders. Pt to have repeat labs .     Pt's is having sutures removed in left elbow.    Pt out on unit attending unit programming.     Pt has been working on his CD assessment paperwork.     Discharge plans pending.     Pt denies feeling depressed or anxious. Denies SI.

## 2020-07-25 LAB
ANION GAP SERPL CALCULATED.3IONS-SCNC: 7 MMOL/L (ref 3–14)
BUN SERPL-MCNC: 15 MG/DL (ref 7–30)
CALCIUM SERPL-MCNC: 9 MG/DL (ref 8.5–10.1)
CHLORIDE SERPL-SCNC: 103 MMOL/L (ref 94–109)
CO2 SERPL-SCNC: 27 MMOL/L (ref 20–32)
CREAT SERPL-MCNC: 0.99 MG/DL (ref 0.66–1.25)
GFR SERPL CREATININE-BSD FRML MDRD: >90 ML/MIN/{1.73_M2}
GLUCOSE SERPL-MCNC: 98 MG/DL (ref 70–99)
POTASSIUM SERPL-SCNC: 4 MMOL/L (ref 3.4–5.3)
SODIUM SERPL-SCNC: 137 MMOL/L (ref 133–144)

## 2020-07-25 PROCEDURE — 80048 BASIC METABOLIC PNL TOTAL CA: CPT | Performed by: PHYSICIAN ASSISTANT

## 2020-07-25 PROCEDURE — 25000132 ZZH RX MED GY IP 250 OP 250 PS 637: Performed by: PSYCHIATRY & NEUROLOGY

## 2020-07-25 PROCEDURE — 12800008 ZZH R&B CD ADULT

## 2020-07-25 PROCEDURE — 36415 COLL VENOUS BLD VENIPUNCTURE: CPT | Performed by: PHYSICIAN ASSISTANT

## 2020-07-25 RX ADMIN — GABAPENTIN 300 MG: 300 CAPSULE ORAL at 20:04

## 2020-07-25 RX ADMIN — NICOTINE 1 PATCH: 14 PATCH, EXTENDED RELEASE TRANSDERMAL at 08:57

## 2020-07-25 RX ADMIN — AMLODIPINE BESYLATE 10 MG: 10 TABLET ORAL at 20:05

## 2020-07-25 RX ADMIN — CLONIDINE HYDROCHLORIDE 0.1 MG: 0.1 TABLET ORAL at 20:04

## 2020-07-25 RX ADMIN — PHENOBARBITAL 64.8 MG: 64.8 TABLET ORAL at 13:57

## 2020-07-25 RX ADMIN — GABAPENTIN 300 MG: 300 CAPSULE ORAL at 08:57

## 2020-07-25 RX ADMIN — CLONIDINE HYDROCHLORIDE 0.1 MG: 0.1 TABLET ORAL at 08:57

## 2020-07-25 RX ADMIN — PHENOBARBITAL 64.8 MG: 64.8 TABLET ORAL at 08:57

## 2020-07-25 RX ADMIN — PHENOBARBITAL 64.8 MG: 64.8 TABLET ORAL at 20:04

## 2020-07-25 RX ADMIN — METHADONE HYDROCHLORIDE 60 MG: 5 SOLUTION ORAL at 08:58

## 2020-07-25 RX ADMIN — NICOTINE POLACRILEX 4 MG: 2 LOZENGE ORAL at 20:46

## 2020-07-25 RX ADMIN — QUETIAPINE FUMARATE 100 MG: 100 TABLET ORAL at 21:13

## 2020-07-25 RX ADMIN — ESCITALOPRAM OXALATE 20 MG: 20 TABLET ORAL at 08:57

## 2020-07-25 RX ADMIN — CLONIDINE HYDROCHLORIDE 0.1 MG: 0.1 TABLET ORAL at 13:57

## 2020-07-25 ASSESSMENT — ACTIVITIES OF DAILY LIVING (ADL)
LAUNDRY: WITH SUPERVISION
DRESS: INDEPENDENT
ORAL_HYGIENE: INDEPENDENT
HYGIENE/GROOMING: INDEPENDENT

## 2020-07-25 NOTE — PROGRESS NOTES
SPIRITUAL HEALTH SERVICES  SPIRITUAL ASSESSMENT Progress Note  [facility] ([facility]) 3AW     REFERRAL SOURCE: Responded to a follow up request    Pt declined follow up support at this time but was grateful for the offer.     PLAN: [facility] remains available for further support upon request.     Himanshu Manjarrez [facility], DMin  Staff   Pager [phone]

## 2020-07-25 NOTE — PLAN OF CARE
"  Problem: Substance Withdrawal  Goal: Substance Withdrawal  Description: Signs and symptoms of listed problems will be absent or manageable.  Outcome: No Change    1) Patient will achieve medical stabilization of acute withdrawal sx.  2) Patient will remain safe and free from injury  3) Patient will demonstrate improvement of ADLs (appetite, hygiene)  4) Verbalize reduction of fear or anxiety to a manageable level.  5) Verbalize knowledge of substance abuse as a disease  6) Verbalize risks and negative effects related to drug ingestion  7) Demonstrate participation in unit programming and attends specific substance use group therapy (i.e AA meetings)  8) Accept referral to substance abuse treatment  9) Express sense of regaining some control of situation/life (possible by verbalizing alternative coping mechanisms as alternatives to substance use in response to stress)      Pt received 60 mg of methadone this am. Pt's dose will increase tomorrow to 70 mg of methadone daily.     Pt continues on scheduled phenobarbital.     Pt denies feeling depressed or anxious.     Pt stated \"I just woke up this morning and didn't feel the greatest\". Pt out on unit in the morning. Resting in room this afternoon. States he feels tired this afternoon. Pt did not eat breakfast but ate some for lunch.       Pt has completed his assessment paperwork. Discharge plans pending.      Denies SI.      "
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
## 2020-07-26 PROCEDURE — 12800008 ZZH R&B CD ADULT

## 2020-07-26 PROCEDURE — 25000132 ZZH RX MED GY IP 250 OP 250 PS 637: Performed by: PSYCHIATRY & NEUROLOGY

## 2020-07-26 RX ADMIN — METHADONE HYDROCHLORIDE 70 MG: 5 SOLUTION ORAL at 08:05

## 2020-07-26 RX ADMIN — ACETAMINOPHEN 650 MG: 325 TABLET, FILM COATED ORAL at 21:04

## 2020-07-26 RX ADMIN — GABAPENTIN 300 MG: 300 CAPSULE ORAL at 08:04

## 2020-07-26 RX ADMIN — QUETIAPINE FUMARATE 100 MG: 100 TABLET ORAL at 21:04

## 2020-07-26 RX ADMIN — HYDROXYZINE HYDROCHLORIDE 25 MG: 25 TABLET, FILM COATED ORAL at 05:07

## 2020-07-26 RX ADMIN — AMLODIPINE BESYLATE 10 MG: 10 TABLET ORAL at 20:31

## 2020-07-26 RX ADMIN — CLONIDINE HYDROCHLORIDE 0.1 MG: 0.1 TABLET ORAL at 20:30

## 2020-07-26 RX ADMIN — ESCITALOPRAM OXALATE 20 MG: 20 TABLET ORAL at 08:03

## 2020-07-26 RX ADMIN — NICOTINE 1 PATCH: 14 PATCH, EXTENDED RELEASE TRANSDERMAL at 08:04

## 2020-07-26 RX ADMIN — GABAPENTIN 300 MG: 300 CAPSULE ORAL at 20:31

## 2020-07-26 RX ADMIN — PHENOBARBITAL 32.4 MG: 32.4 TABLET ORAL at 08:04

## 2020-07-26 RX ADMIN — CLONIDINE HYDROCHLORIDE 0.1 MG: 0.1 TABLET ORAL at 14:06

## 2020-07-26 RX ADMIN — PHENOBARBITAL 32.4 MG: 32.4 TABLET ORAL at 20:31

## 2020-07-26 RX ADMIN — PHENOBARBITAL 97.2 MG: 64.8 TABLET ORAL at 21:47

## 2020-07-26 RX ADMIN — CLONIDINE HYDROCHLORIDE 0.1 MG: 0.1 TABLET ORAL at 08:04

## 2020-07-26 ASSESSMENT — ACTIVITIES OF DAILY LIVING (ADL)
ORAL_HYGIENE: INDEPENDENT
DRESS: INDEPENDENT
HYGIENE/GROOMING: INDEPENDENT
LAUNDRY: WITH SUPERVISION

## 2020-07-26 NOTE — PLAN OF CARE
Problem: Substance Withdrawal  Goal: Substance Withdrawal  Description: Signs and symptoms of listed problems will be absent or manageable.  Outcome: No Change     Pt came to desk at beginning of shift reporting he does not feel well. Pt states he feels his methadone does not last him through the night and he is waking up not feeling well. States not appetite in the morning. Generalized malaise.     Pt received methadone 70 mg this am.     He felt better this afternoon and was able to eat lunch.     Out on unit. Denies feeling depressed or anxious. Mood is calm. Denies SI.     He is hoping to be able to be discharged to a treatment program tomorrow.     Phenobarbital dose is tapering.      Fidencio say he did get complacent prior his relapse.  Does attend recovery meetings and has a sponsor.     Pt plans to continue on with methadone at [facility]. Pt has contact information with them and was recommended he contact them on prior to discharge.     Pt states he has been on methadone maintenance since spring 2020.     Pt will call [facility] to confirm his psychiatry appointment.

## 2020-07-27 VITALS
SYSTOLIC BLOOD PRESSURE: 127 MMHG | TEMPERATURE: 98.3 F | WEIGHT: 155 LBS | HEIGHT: 66 IN | OXYGEN SATURATION: 100 % | RESPIRATION RATE: 16 BRPM | DIASTOLIC BLOOD PRESSURE: 81 MMHG | HEART RATE: 73 BPM | BODY MASS INDEX: 24.91 KG/M2

## 2020-07-27 PROCEDURE — 25000132 ZZH RX MED GY IP 250 OP 250 PS 637: Performed by: PSYCHIATRY & NEUROLOGY

## 2020-07-27 PROCEDURE — 99239 HOSP IP/OBS DSCHRG MGMT >30: CPT | Mod: GT | Performed by: PSYCHIATRY & NEUROLOGY

## 2020-07-27 RX ORDER — PHENOBARBITAL 32.4 MG/1
32.4 TABLET ORAL AT BEDTIME
Qty: 9 TABLET | Refills: 0 | Status: SHIPPED | OUTPATIENT
Start: 2020-07-27 | End: 2020-07-27

## 2020-07-27 RX ORDER — ESCITALOPRAM OXALATE 20 MG/1
20 TABLET ORAL DAILY
Qty: 30 TABLET | Refills: 0 | Status: ON HOLD | OUTPATIENT
Start: 2020-07-27 | End: 2023-06-08

## 2020-07-27 RX ORDER — AMLODIPINE BESYLATE 10 MG/1
10 TABLET ORAL EVERY EVENING
Qty: 30 TABLET | Refills: 0 | Status: ON HOLD | OUTPATIENT
Start: 2020-07-27 | End: 2023-06-08

## 2020-07-27 RX ORDER — CLONIDINE HYDROCHLORIDE 0.1 MG/1
0.1 TABLET ORAL 3 TIMES DAILY
Qty: 90 TABLET | Refills: 0 | Status: ON HOLD | OUTPATIENT
Start: 2020-07-27 | End: 2020-09-30

## 2020-07-27 RX ORDER — PHENOBARBITAL 32.4 MG/1
32.4 TABLET ORAL AT BEDTIME
Qty: 9 TABLET | Refills: 0 | Status: SHIPPED | OUTPATIENT
Start: 2020-07-27 | End: 2020-09-29

## 2020-07-27 RX ORDER — GABAPENTIN 300 MG/1
300 CAPSULE ORAL
Qty: 90 CAPSULE | Refills: 0 | Status: SHIPPED | OUTPATIENT
Start: 2020-07-27 | End: 2020-09-29

## 2020-07-27 RX ORDER — QUETIAPINE FUMARATE 100 MG/1
100 TABLET, FILM COATED ORAL AT BEDTIME
Qty: 30 TABLET | Refills: 0 | Status: ON HOLD | OUTPATIENT
Start: 2020-07-27 | End: 2020-09-30

## 2020-07-27 RX ORDER — METHADONE HYDROCHLORIDE 5 MG/5ML
70 SOLUTION ORAL DAILY
Refills: 0 | Status: ON HOLD | COMMUNITY
Start: 2020-07-28 | End: 2023-06-08

## 2020-07-27 RX ADMIN — ACETAMINOPHEN 650 MG: 325 TABLET, FILM COATED ORAL at 10:24

## 2020-07-27 RX ADMIN — METHADONE HYDROCHLORIDE 70 MG: 5 SOLUTION ORAL at 07:55

## 2020-07-27 RX ADMIN — HYDROXYZINE HYDROCHLORIDE 25 MG: 25 TABLET, FILM COATED ORAL at 04:29

## 2020-07-27 RX ADMIN — PHENOBARBITAL 32.4 MG: 32.4 TABLET ORAL at 07:58

## 2020-07-27 RX ADMIN — NICOTINE 1 PATCH: 14 PATCH, EXTENDED RELEASE TRANSDERMAL at 07:57

## 2020-07-27 RX ADMIN — ESCITALOPRAM OXALATE 20 MG: 20 TABLET ORAL at 07:58

## 2020-07-27 RX ADMIN — CLONIDINE HYDROCHLORIDE 0.1 MG: 0.1 TABLET ORAL at 07:58

## 2020-07-27 RX ADMIN — GABAPENTIN 300 MG: 300 CAPSULE ORAL at 07:58

## 2020-07-27 NOTE — DISCHARGE INSTRUCTIONS
Behavioral Discharge Planning and Instructions  THANK YOU FOR CHOOSING [facility]  3A  [phone]    Summary: You were admitted to [facility] on 7/22/2020 for detoxification from benzodiazepines and maintenance treatment of opiates.  A medical exam was performed that included lab work. You have met with a  and opted to attend [facility].  Please take care and make your recovery a daily priority. It was a pleasure working with you and the entire treatment team here wishes you the very best in your recovery, Mr. Mccormick.    Recommendation:  Enter and complete a residential treatment program such as [facility] and follow all continuing care recommendations.      Main Diagnoses: Sedative hypnotic use disorder, severe; opiate use disorder, severe; depressive disorder; generalized anxiety disorder.     Major Treatments, Procedures and Findings:  You were treated for benzodiazepine (xanax) detoxification using phenobarbital. As an outpatient you will be prescribed a phenobarbital taper, please take this medication as prescribed until it is gone. You completed a chemical dependency assessment. You had labs drawn and those results were reviewed with you. Please take a copy of your lab work with you to your next primary care physician appointment.    Safety Notice:  The rates for overdoses and deaths from opioids is constantly rising and is a current health epidemic. Please be aware that your tolerance level to any form of opioids has decreased since your hospitalization. Resuming opioid use and/or taking opioids in any form or in combination with any other drugs or alcohol is life-threatening.  Be advised that returning to your drug-using environment and being around people using drugs puts you at high risk for relapse. Healthier support is available daily at local AA or NA meetings.    Please keep your methadone in a safe place preferably in a locked box and out of reach of children/pets/others.      Symptoms to Report:  If you experience more anxiety, confusion, sleeplessness, deep sadness or thoughts of suicide, notify your treatment team or notify your primary care physician. IF ANY OF THE SYMPTOMS YOU ARE EXPERIENCING ARE A MEDICAL EMERGENCY CALL 911 IMMEDIATELY.     Lifestyle Adjustment: Adjust your lifestyle to get enough sleep, relaxation, exercise and good nutrition. Continue to develop healthy coping skills to decrease stress and promote a sober living environment. Do not use mood altering substances including alcohol, illegal drugs or addictive medications other than what is currently prescribed.     Disposition:  [facility]  Address: [address] [place] [address]  OR  [facility]  Address: [address] [id number]  Residential Admissions: [phone]  Admit date/time: TBD. YOU MUST CALL INTAKE ([phone]) DAILY TO SECURE A BED.   About: Located in the heart of the recovery community, [facility] has served more than 40,000 individuals over the past fifty plus years. [facility] utilizes evidence-based practices designed to treat co-occurring substance use and mental health disorders. These include but are not limited to: group and individual counseling, gender-sensitive programming, individualized lengths of stay, recovery management skills, a connection to community resources and trauma work (i.e. Pro-longed exposure therapy, trauma skills groups).     Facts about COVID19 at www.cdc.gov/COVID19 and www.[place].gov/covid19    Keeping hands clean is one of the most important steps we can take to avoid getting sick and spreading germs to others.  Please wash your hands frequently and lather with soap for at least 20 seconds!    Methadone Maintenance:  [facility]  [address] [id number]  # [phone]  About: At [facility], we know the neves against addiction is hard fought, but it s not one you ever have to fight alone. Here, you ll find a place  of hope and strength, not judgment. Every person and addiction is unique, so our approach is, too. So everything we do, every treatment service offered, every substance abuse counselor is centered around healing the minds, bodies and spirits of those struggling with addiction. We dedicate ourselves to offering a progressive approach and specialized care, empowering people to create healing, meaningful change.    Medical Follow Up: [facility] Patient states he will follow up once secured in his treatment context.  Mental health service in [place], [place]   COVID-19 info: refugio.    Located in: [facility]   Address: [address] [place] [address]   Hours:   Closed ? Opens 8AM Mon   Phone: [phone]    Resources:  *due to covid-19 AA/NA meetings are being held online*  AA meetings can be found online; search for them at: http://aa-intergroup.org/directory.php  AA meetings via [place] for [place] area can be found online at: https://aaRanker.org/find-a-meeting/holiday-closings/  NA meetings via [place] for [place] area can be found online at: https://sites.Altatech.com/view/[place]regionofnarcoticsanonymous/home?authuser=2  AA/NA and Sponsors are excellent resources for support and you can find one at any support group meeting.   Alcoholics Anonymous (www.alcoholics-anonymous.org): for local information 24 hours/day  AA Intergroup service office in [place] (http://www.aastpaul.org/) [phone]  AA Intergroup service office in [facility]: [phone]. (http://www.rVue.org/)  Narcotics Anonymous (www.nam[place].org) (268) 694-3350  https://aafairviewriverside.org/meetings  SMART Recovery - self management for addiction recovery:  www.smartrecovery.org  Pathways ~ A Health Crisis Resource & Support Center:  647.527.4183.  https://prescribetoprevent.org/patient-education/videos/  http://www.harmreduction.org  [facility] [phone]  Support Group:   AA/NA and Sponsor/support.  National [place] on Mental Illness (www.[place].ronny.org): 621.753.9517 or 800-817-5306.  Alcoholics Anonymous (www.alcoholics-anonymous.org): Check your phone book for your local chapter.  Suicide Awareness Voices of Education (SAVE) (www.save.org): 960-895-HQAH (0267)  National Suicide Prevention Line (www.mentalhealthmn.org): 765-114-ADOQ (8006)  Mental Health Consumer/Survivor Network of [place] (www.mhcsn.net): 368.557.6069 or 672-641-6711  Mental Health Association of [place] (www.mentalhealth.org): 547.944.7592 or 250-886-4663   Substance Abuse and Mental Health Services (www.samhsa.gov)  [place] Opioid Prevention Coalition: www.opioidcoalition.org    [place] Recovery Connection ([facility])  [facility] connects people seeking recovery to resources that help foster and sustain long-term recovery.  Whether you are seeking resources for treatment, transportation, housing, job training, education, health care or other pathways to recovery, [facility] is a great place to start.  [phone].    www.[facility].org    General Medication Instructions:   See your medication sheet(s) for instructions.   Take all medications as prescribed.  Make no changes unless your doctor suggests them.   Go to all your doctor visits.  Be sure to have all your required lab tests. This way, your medicines can be refilled on time.  Do not use any forms of alcohol.    Please Note:   If you have any questions at anytime after you are discharged please call [facility] detox unit 3AW at [phone].  [facility], Behavioral Intake [phone]  Medical Records call [phone]   Outpatient Behavioral Intake call [phone]  LP+ Wait List/Bed Availability call [phone]    Please remember to take all of your behavioral discharge planning and lab paperwork to any follow up appointments, it contains your lab results, diagnosis, medication list and discharge recommendations.    THANK YOU FOR CHOOSING [facility]

## 2020-07-27 NOTE — DISCHARGE SUMMARY
[id number] Visit: The patient's condition can be safely assessed and treated via synchronous audio and visual telemedicine encounter.   Start Time: 8.10  Stop Time: 8.20  Reason for Telemedicine Visit: Covid-19   Originating Site (Patient Location): [facility]  Distant Site (Provider Location): Provider Remote Setting   Consent: The patient/guardian has verbally consented to: the potential risks and benefits of telemedicine (video visit) versus in person care; bill my insurance or make self-payment for services provided; and responsibility for payment of non-covered services.   Mode of Communication: Video Conference via polycom  As the provider I attest to compliance with applicable laws and regulations related to telemedicine.       The patient/guardian has been notified of the following:   This telemedicine visit is conducted live between you and your clinician. We have found that certain health care needs can be provided without the need for a physical exam. This service lets us provide the care you need with a telemedicine conversation.

## 2020-07-27 NOTE — DISCHARGE SUMMARY
Admit Date:     07/22/2020   Discharge Date:           More than 35 minutes spent on discharge summary, doing the discharge instructions, discharge medication, discharge mental status examination.  The patient was seen by telepsychiatry.      DISCHARGE DIAGNOSES:   AXIS I:   1.  Sedative hypnotic use disorder.   2.  Opiate use disorder, on methadone maintenance.   3.  Depressive disorder, not otherwise specified.   4.  Generalized anxiety disorder.      Please see detailed admission note by Dr. Douglas on 07/23/2020.      HOSPITAL COURSE:  During the hospitalization, the patient was detoxed off benzos using phenobarbital.  Methadone clinic was contacted.  They said that we can restart the methadone at 60 mg for 3 days and then go up to 70 mg and to follow up with him.  For his depression, he is continuing Lexapro.  During the hospitalization, the patient was seen by Rylee Moon for Internal Medicine consult.  Please see detailed note on 07/23/2020.  The patient has continued on Seroquel and gabapentin was reduced to 300 mg twice a day.  He had lab work done, which shows potassium is 3.3, which repeated came back normal to 3.4.  Otherwise CBC, CMP were essentially normal.  During the hospitalization, the patient had acute kidney injury.  Creatinine was 2.3, baseline is 0.8.  He has been using ibuprofen and he was asked to stop using it and encouraged to take oral fluids.  During the hospitalization, the patient's energy, motivation, sleep and interest improved.  He is going to home.  I would ideally like for him to go directly to treatment, but he states that he has a place to stay with his parents and he [facility] is a bed available.      DISCHARGE MENTAL STATUS EXAMINATION:  The patient is alert, oriented x 3.  Recent and remote memory, language, fund of knowledge are adequate.  No loose associations.  The patient will go back to [place] for methadone maintenance.  He is going back  tomorrow.  He is going to go to [facility] when a bed opens.  He is having an appointment again with [facility].      The patient is alert, oriented x 3.  Recent and remote memory, language, fund of knowledge are all adequate.  No loose associations.  The patient does not have any active suicidal or homicidal ideation, plan or intent.  The patient is stable to be discharged.                 Labs:   No results found for this or any previous visit (from the past 48 hour(s)).  Because this patient meets criteria for an Alcohol Use Disorder, I performed the following brief intervention on the date of this note:              1) Expressed concern that the patient is drinking at unhealthy levels known to increase their risk of alcohol related problems              2) Gave feedback linking alcohol use and health, including personalized feedback explaining how alcohol use can interact with their medical and/or psychiatric problems, and with prescribed medications.              3) Advised patient to abstain.      DISCHARGE MENTAL STATUS EXAMINATION:  The patient is alert, oriented x3.  Good fund of knowledge.  Good use of language.  Recent and remote memory, language, fund of knowledge are all adequate.  Euthymic mood congruent affect  Speech normal rate/rhythm linear tp no loose asso,The patient does not have any active suicidal or homicidal ideation.  Does not have any auditory or visual hallucination.  Fair insight/judgment At this time, the patient was stable to be discharged.        Pt was not determined to not be a danger to himself or others. At the current time of discharge, the patient does not meet criteria for involuntary hospitalization. On the day of discharge, the patient reports that they do not have suicidal or homicidal ideation and would never hurt themselves or others. Steps taken to minimize risk include: assessing patient s behavior and thought process daily during hospital stay, discharging patient with  adequate plan for follow up for mental and physical health and discussing safety plan of returning to the hospital should the patient ever have thoughts of harming themselves or others. Therefore, based on all available evidence including the factors cited above, the patient does not appear to be at imminent risk for self-harm, and is appropriate for outpatient level of care.     Educated about side effects/risk vs benefits /alternative including non treatment.Pt consented to be on medication.     .Total time spent on discharge summary more than 35 min  More than  20 min  planning, coordination of care, medication reconciliation and performance of physical exam on day of discharge.Care was coordinated with unit RN and unit therapist    .   Fidencio Mccormick   Home Medication Instructions YOKASTA:[id number]    Printed on:07/27/20 3656   Medication Information                      amLODIPine (NORVASC) 10 MG tablet  Take 1 tablet (10 mg) by mouth every evening             cloNIDine (CATAPRES) 0.1 MG tablet  Take 1 tablet (0.1 mg) by mouth 3 times daily             escitalopram (LEXAPRO) 20 MG tablet  Take 1 tablet (20 mg) by mouth daily             gabapentin (NEURONTIN) 300 MG capsule  Take 1 capsule (300 mg) by mouth 2 times daily             methadone (DOLPHINE) 5 MG/5ML solution  Take 70 mLs (70 mg) by mouth daily             PHENobarbital (LUMINAL) 32.4 MG tablet  Take 1 tablet (32.4 mg) by mouth At Bedtime Take 3 tabs at bedtime x2 days , then  2 tabs at bedtime x1 day, Take 1 tabs at bedtime x1 day             QUEtiapine (SEROQUEL) 100 MG tablet  Take 1 tablet (100 mg) by mouth At Bedtime                Disposition:  [facility]  Address: [address] [place] [address]  OR  [facility]  Address: [address] [place] [address]  Residential Admissions: [phone]  Admit date/time: TBD. YOU MUST CALL INTAKE ([phone]) DAILY TO SECURE A BED.   About: Located in the heart of the recovery  community, [facility] has served more than 40,000 individuals over the past fifty plus years. [facility] utilizes evidence-based practices designed to treat co-occurring substance use and mental health disorders. These include but are not limited to: group and individual counseling, gender-sensitive programming, individualized lengths of stay, recovery management skills, a connection to community resources and trauma work (i.e. Pro-longed exposure therapy, trauma skills groups).   Medical Follow Up: [facility] Patient states he will follow up once secured in his treatment context.  Mental health service in [place], [place]   [facility]    Located in: [facility]   Address: [address] [place] [address]   Hours:   Closed ? Opens 8AM Mon   Phone: [phone]     KHALIF KNIGHT MD             D: 2020   T: 2020   MT: DRISS      Name:     WASHINGTON CHAVEZ   MRN:      [id number]        Account:        [id number]   :      1995           Admit Date:     2020                                  Discharge Date:       Document: [id number]       cc: Katie Beaulieu DO

## 2020-07-27 NOTE — PROGRESS NOTES
Case Management Note  7/27/2020    Checked in with pt this AM who reported a plan to discharge today to his parents' home to await a [facility] bed if there isn't one available today.     Spoke with Gregory @ [facility]. They received pt's Rule 25. They will place pt on their same-day waitlist. There are currently 12 people on waitlist. Pt needs to call them to get placed on their waitlist, and call each day to try and secure a bed if one becomes available. Explained this to pt and provided pt with [facility] number. Pt also requested info for setting up primary care at [facility] and was provided this.     Jada Suresh, JOHANNA, [facility]

## 2020-07-27 NOTE — PROGRESS NOTES
"Pt out on unit and attended in house recovery meeting.  Pt out on unit this evening. Temp 99.1. Pt requested tylenol. Denies other symptoms other than stated \"I kind of  feel agitated.\"  Pt has not been using Lidoderm patches.     "

## 2020-09-29 ENCOUNTER — HOSPITAL ENCOUNTER (OUTPATIENT)
Facility: CLINIC | Age: 25
Setting detail: OBSERVATION
Discharge: HOME | End: 2020-09-30
Attending: EMERGENCY MEDICINE | Admitting: INTERNAL MEDICINE
Payer: COMMERCIAL

## 2020-09-29 ENCOUNTER — APPOINTMENT (OUTPATIENT)
Dept: MRI IMAGING | Facility: CLINIC | Age: 25
End: 2020-09-29
Attending: EMERGENCY MEDICINE
Payer: COMMERCIAL

## 2020-09-29 DIAGNOSIS — F11.29 OPIOID DEPENDENCE WITH OPIOID-INDUCED DISORDER (H): ICD-10-CM

## 2020-09-29 DIAGNOSIS — Z91.148 NONCOMPLIANCE WITH MEDICATION REGIMEN: ICD-10-CM

## 2020-09-29 DIAGNOSIS — F43.10 PTSD (POST-TRAUMATIC STRESS DISORDER): ICD-10-CM

## 2020-09-29 DIAGNOSIS — R56.9 SEIZURE (H): ICD-10-CM

## 2020-09-29 DIAGNOSIS — F19.20 CHEMICAL DEPENDENCY (H): ICD-10-CM

## 2020-09-29 LAB
AMPHETAMINES UR QL SCN: NEGATIVE
ANION GAP SERPL CALCULATED.3IONS-SCNC: 6 MMOL/L (ref 3–14)
BARBITURATES UR QL: NEGATIVE
BASOPHILS # BLD AUTO: 0 10E9/L (ref 0–0.2)
BASOPHILS NFR BLD AUTO: 0.1 %
BENZODIAZ UR QL: NEGATIVE
BUN SERPL-MCNC: 21 MG/DL (ref 7–30)
CALCIUM SERPL-MCNC: 9.6 MG/DL (ref 8.5–10.1)
CANNABINOIDS UR QL SCN: NEGATIVE
CHLORIDE SERPL-SCNC: 105 MMOL/L (ref 94–109)
CO2 SERPL-SCNC: 25 MMOL/L (ref 20–32)
COCAINE UR QL: NEGATIVE
CREAT SERPL-MCNC: 0.8 MG/DL (ref 0.66–1.25)
DIFFERENTIAL METHOD BLD: ABNORMAL
EOSINOPHIL # BLD AUTO: 0 10E9/L (ref 0–0.7)
EOSINOPHIL NFR BLD AUTO: 0 %
ERYTHROCYTE [DISTWIDTH] IN BLOOD BY AUTOMATED COUNT: 13.7 % (ref 10–15)
ETHANOL SERPL-MCNC: <0.01 G/DL
GFR SERPL CREATININE-BSD FRML MDRD: >90 ML/MIN/{1.73_M2}
GLUCOSE SERPL-MCNC: 139 MG/DL (ref 70–99)
HBA1C MFR BLD: 5.5 % (ref 0–5.6)
HCT VFR BLD AUTO: 44.2 % (ref 40–53)
HGB BLD-MCNC: 15.4 G/DL (ref 13.3–17.7)
IMM GRANULOCYTES # BLD: 0 10E9/L (ref 0–0.4)
IMM GRANULOCYTES NFR BLD: 0.3 %
INTERPRETATION ECG - MUSE: NORMAL
LYMPHOCYTES # BLD AUTO: 1.1 10E9/L (ref 0.8–5.3)
LYMPHOCYTES NFR BLD AUTO: 10.1 %
MCH RBC QN AUTO: 30 PG (ref 26.5–33)
MCHC RBC AUTO-ENTMCNC: 34.8 G/DL (ref 31.5–36.5)
MCV RBC AUTO: 86 FL (ref 78–100)
MONOCYTES # BLD AUTO: 0.3 10E9/L (ref 0–1.3)
MONOCYTES NFR BLD AUTO: 2.6 %
NEUTROPHILS # BLD AUTO: 9.2 10E9/L (ref 1.6–8.3)
NEUTROPHILS NFR BLD AUTO: 86.9 %
NRBC # BLD AUTO: 0 10*3/UL
NRBC BLD AUTO-RTO: 0 /100
OPIATES UR QL SCN: NEGATIVE
PCP UR QL SCN: NEGATIVE
PLATELET # BLD AUTO: 362 10E9/L (ref 150–450)
POTASSIUM SERPL-SCNC: 3.4 MMOL/L (ref 3.4–5.3)
RBC # BLD AUTO: 5.14 10E12/L (ref 4.4–5.9)
SODIUM SERPL-SCNC: 136 MMOL/L (ref 133–144)
WBC # BLD AUTO: 10.6 10E9/L (ref 4–11)

## 2020-09-29 PROCEDURE — 70553 MRI BRAIN STEM W/O & W/DYE: CPT

## 2020-09-29 PROCEDURE — 25800030 ZZH RX IP 258 OP 636: Performed by: EMERGENCY MEDICINE

## 2020-09-29 PROCEDURE — C9803 HOPD COVID-19 SPEC COLLECT: HCPCS

## 2020-09-29 PROCEDURE — 25000132 ZZH RX MED GY IP 250 OP 250 PS 637: Performed by: EMERGENCY MEDICINE

## 2020-09-29 PROCEDURE — 85025 COMPLETE CBC W/AUTO DIFF WBC: CPT | Performed by: EMERGENCY MEDICINE

## 2020-09-29 PROCEDURE — 96374 THER/PROPH/DIAG INJ IV PUSH: CPT | Mod: 59

## 2020-09-29 PROCEDURE — G0378 HOSPITAL OBSERVATION PER HR: HCPCS

## 2020-09-29 PROCEDURE — 80320 DRUG SCREEN QUANTALCOHOLS: CPT | Performed by: EMERGENCY MEDICINE

## 2020-09-29 PROCEDURE — 25000128 H RX IP 250 OP 636: Performed by: EMERGENCY MEDICINE

## 2020-09-29 PROCEDURE — 80307 DRUG TEST PRSMV CHEM ANLYZR: CPT | Performed by: EMERGENCY MEDICINE

## 2020-09-29 PROCEDURE — 25000132 ZZH RX MED GY IP 250 OP 250 PS 637: Performed by: INTERNAL MEDICINE

## 2020-09-29 PROCEDURE — 80048 BASIC METABOLIC PNL TOTAL CA: CPT | Performed by: EMERGENCY MEDICINE

## 2020-09-29 PROCEDURE — 96361 HYDRATE IV INFUSION ADD-ON: CPT

## 2020-09-29 PROCEDURE — U0003 INFECTIOUS AGENT DETECTION BY NUCLEIC ACID (DNA OR RNA); SEVERE ACUTE RESPIRATORY SYNDROME CORONAVIRUS 2 (SARS-COV-2) (CORONAVIRUS DISEASE [COVID-19]), AMPLIFIED PROBE TECHNIQUE, MAKING USE OF HIGH THROUGHPUT TECHNOLOGIES AS DESCRIBED BY CMS-2020-01-R: HCPCS | Performed by: EMERGENCY MEDICINE

## 2020-09-29 PROCEDURE — A9585 GADOBUTROL INJECTION: HCPCS | Performed by: INTERNAL MEDICINE

## 2020-09-29 PROCEDURE — 25500064 ZZH RX 255 OP 636: Performed by: INTERNAL MEDICINE

## 2020-09-29 PROCEDURE — 93005 ELECTROCARDIOGRAM TRACING: CPT

## 2020-09-29 PROCEDURE — 25000128 H RX IP 250 OP 636: Performed by: INTERNAL MEDICINE

## 2020-09-29 PROCEDURE — 99223 1ST HOSP IP/OBS HIGH 75: CPT | Mod: AI | Performed by: INTERNAL MEDICINE

## 2020-09-29 PROCEDURE — 96375 TX/PRO/DX INJ NEW DRUG ADDON: CPT | Mod: 59

## 2020-09-29 PROCEDURE — 83036 HEMOGLOBIN GLYCOSYLATED A1C: CPT | Performed by: EMERGENCY MEDICINE

## 2020-09-29 PROCEDURE — 99285 EMERGENCY DEPT VISIT HI MDM: CPT | Mod: 25

## 2020-09-29 RX ORDER — ONDANSETRON 2 MG/ML
4 INJECTION INTRAMUSCULAR; INTRAVENOUS ONCE
Status: COMPLETED | OUTPATIENT
Start: 2020-09-29 | End: 2020-09-29

## 2020-09-29 RX ORDER — ONDANSETRON 2 MG/ML
8 INJECTION INTRAMUSCULAR; INTRAVENOUS ONCE
Status: DISCONTINUED | OUTPATIENT
Start: 2020-09-29 | End: 2020-09-29

## 2020-09-29 RX ORDER — GABAPENTIN 300 MG/1
300 CAPSULE ORAL 3 TIMES DAILY
Status: ON HOLD | COMMUNITY
End: 2023-06-08

## 2020-09-29 RX ORDER — GABAPENTIN 300 MG/1
300 CAPSULE ORAL 3 TIMES DAILY
Status: DISCONTINUED | OUTPATIENT
Start: 2020-09-29 | End: 2020-09-30 | Stop reason: HOSPADM

## 2020-09-29 RX ORDER — DIPHENHYDRAMINE HYDROCHLORIDE 50 MG/ML
25 INJECTION INTRAMUSCULAR; INTRAVENOUS EVERY 6 HOURS PRN
Status: DISCONTINUED | OUTPATIENT
Start: 2020-09-29 | End: 2020-09-30 | Stop reason: HOSPADM

## 2020-09-29 RX ORDER — AMOXICILLIN 250 MG
1 CAPSULE ORAL 2 TIMES DAILY PRN
Status: DISCONTINUED | OUTPATIENT
Start: 2020-09-29 | End: 2020-09-30 | Stop reason: HOSPADM

## 2020-09-29 RX ORDER — POLYETHYLENE GLYCOL 3350 17 G/17G
17 POWDER, FOR SOLUTION ORAL DAILY PRN
Status: DISCONTINUED | OUTPATIENT
Start: 2020-09-29 | End: 2020-09-30 | Stop reason: HOSPADM

## 2020-09-29 RX ORDER — DIPHENHYDRAMINE HCL 25 MG
25 CAPSULE ORAL EVERY 6 HOURS PRN
Status: DISCONTINUED | OUTPATIENT
Start: 2020-09-29 | End: 2020-09-30 | Stop reason: HOSPADM

## 2020-09-29 RX ORDER — AMOXICILLIN 250 MG
2 CAPSULE ORAL 2 TIMES DAILY PRN
Status: DISCONTINUED | OUTPATIENT
Start: 2020-09-29 | End: 2020-09-30 | Stop reason: HOSPADM

## 2020-09-29 RX ORDER — LORAZEPAM 2 MG/ML
2 INJECTION INTRAMUSCULAR
Status: DISCONTINUED | OUTPATIENT
Start: 2020-09-29 | End: 2020-09-30 | Stop reason: HOSPADM

## 2020-09-29 RX ORDER — METHADONE HYDROCHLORIDE 10 MG/1
70 TABLET ORAL ONCE
Status: DISCONTINUED | OUTPATIENT
Start: 2020-09-29 | End: 2020-09-29 | Stop reason: CLARIF

## 2020-09-29 RX ORDER — METHADONE HYDROCHLORIDE 10 MG/ML
70 CONCENTRATE ORAL EVERY EVENING
Status: DISCONTINUED | OUTPATIENT
Start: 2020-09-30 | End: 2020-09-30 | Stop reason: HOSPADM

## 2020-09-29 RX ORDER — METHADONE HYDROCHLORIDE 10 MG/ML
70 CONCENTRATE ORAL ONCE
Status: COMPLETED | OUTPATIENT
Start: 2020-09-29 | End: 2020-09-29

## 2020-09-29 RX ORDER — AMLODIPINE BESYLATE 10 MG/1
10 TABLET ORAL EVERY EVENING
Status: DISCONTINUED | OUTPATIENT
Start: 2020-09-29 | End: 2020-09-30 | Stop reason: HOSPADM

## 2020-09-29 RX ORDER — SODIUM CHLORIDE AND POTASSIUM CHLORIDE 150; 900 MG/100ML; MG/100ML
INJECTION, SOLUTION INTRAVENOUS CONTINUOUS
Status: ACTIVE | OUTPATIENT
Start: 2020-09-29 | End: 2020-09-30

## 2020-09-29 RX ORDER — ACETAMINOPHEN 650 MG/1
650 SUPPOSITORY RECTAL EVERY 4 HOURS PRN
Status: DISCONTINUED | OUTPATIENT
Start: 2020-09-29 | End: 2020-09-30 | Stop reason: HOSPADM

## 2020-09-29 RX ORDER — ONDANSETRON 2 MG/ML
4 INJECTION INTRAMUSCULAR; INTRAVENOUS EVERY 6 HOURS PRN
Status: DISCONTINUED | OUTPATIENT
Start: 2020-09-29 | End: 2020-09-30 | Stop reason: HOSPADM

## 2020-09-29 RX ORDER — CLONIDINE HYDROCHLORIDE 0.1 MG/1
0.1 TABLET ORAL ONCE
Status: COMPLETED | OUTPATIENT
Start: 2020-09-29 | End: 2020-09-29

## 2020-09-29 RX ORDER — QUETIAPINE FUMARATE 50 MG/1
100 TABLET, FILM COATED ORAL AT BEDTIME
Status: DISCONTINUED | OUTPATIENT
Start: 2020-09-29 | End: 2020-09-30 | Stop reason: HOSPADM

## 2020-09-29 RX ORDER — PROCHLORPERAZINE 25 MG
25 SUPPOSITORY, RECTAL RECTAL EVERY 12 HOURS PRN
Status: DISCONTINUED | OUTPATIENT
Start: 2020-09-29 | End: 2020-09-30 | Stop reason: HOSPADM

## 2020-09-29 RX ORDER — GADOBUTROL 604.72 MG/ML
7 INJECTION INTRAVENOUS ONCE
Status: COMPLETED | OUTPATIENT
Start: 2020-09-29 | End: 2020-09-29

## 2020-09-29 RX ORDER — BISACODYL 10 MG
10 SUPPOSITORY, RECTAL RECTAL DAILY PRN
Status: DISCONTINUED | OUTPATIENT
Start: 2020-09-29 | End: 2020-09-30 | Stop reason: HOSPADM

## 2020-09-29 RX ORDER — ACETAMINOPHEN 325 MG/1
650 TABLET ORAL EVERY 4 HOURS PRN
Status: DISCONTINUED | OUTPATIENT
Start: 2020-09-29 | End: 2020-09-30 | Stop reason: HOSPADM

## 2020-09-29 RX ORDER — ONDANSETRON 4 MG/1
4 TABLET, ORALLY DISINTEGRATING ORAL EVERY 6 HOURS PRN
Status: DISCONTINUED | OUTPATIENT
Start: 2020-09-29 | End: 2020-09-30 | Stop reason: HOSPADM

## 2020-09-29 RX ORDER — NALOXONE HYDROCHLORIDE 0.4 MG/ML
.1-.4 INJECTION, SOLUTION INTRAMUSCULAR; INTRAVENOUS; SUBCUTANEOUS
Status: DISCONTINUED | OUTPATIENT
Start: 2020-09-29 | End: 2020-09-30 | Stop reason: HOSPADM

## 2020-09-29 RX ORDER — DIPHENHYDRAMINE HYDROCHLORIDE 50 MG/ML
25 INJECTION INTRAMUSCULAR; INTRAVENOUS ONCE
Status: COMPLETED | OUTPATIENT
Start: 2020-09-29 | End: 2020-09-29

## 2020-09-29 RX ORDER — CLONIDINE HYDROCHLORIDE 0.1 MG/1
0.1 TABLET ORAL 3 TIMES DAILY
Status: DISCONTINUED | OUTPATIENT
Start: 2020-09-29 | End: 2020-09-30 | Stop reason: HOSPADM

## 2020-09-29 RX ORDER — METHADONE HYDROCHLORIDE 10 MG/ML
70 CONCENTRATE ORAL EVERY EVENING
Status: DISCONTINUED | OUTPATIENT
Start: 2020-09-29 | End: 2020-09-29

## 2020-09-29 RX ORDER — PROCHLORPERAZINE MALEATE 10 MG
10 TABLET ORAL EVERY 6 HOURS PRN
Status: DISCONTINUED | OUTPATIENT
Start: 2020-09-29 | End: 2020-09-30 | Stop reason: HOSPADM

## 2020-09-29 RX ORDER — ESCITALOPRAM OXALATE 10 MG/1
20 TABLET ORAL DAILY
Status: DISCONTINUED | OUTPATIENT
Start: 2020-09-29 | End: 2020-09-30 | Stop reason: HOSPADM

## 2020-09-29 RX ADMIN — QUETIAPINE FUMARATE 100 MG: 50 TABLET ORAL at 21:00

## 2020-09-29 RX ADMIN — ESCITALOPRAM OXALATE 20 MG: 10 TABLET ORAL at 21:00

## 2020-09-29 RX ADMIN — POTASSIUM CHLORIDE AND SODIUM CHLORIDE: 900; 150 INJECTION, SOLUTION INTRAVENOUS at 20:59

## 2020-09-29 RX ADMIN — ONDANSETRON 4 MG: 2 INJECTION INTRAMUSCULAR; INTRAVENOUS at 17:03

## 2020-09-29 RX ADMIN — GABAPENTIN 300 MG: 300 CAPSULE ORAL at 21:00

## 2020-09-29 RX ADMIN — CLONIDINE HYDROCHLORIDE 0.1 MG: 0.1 TABLET ORAL at 18:29

## 2020-09-29 RX ADMIN — GADOBUTROL 7 ML: 604.72 INJECTION INTRAVENOUS at 20:42

## 2020-09-29 RX ADMIN — SODIUM CHLORIDE 1000 ML: 9 INJECTION, SOLUTION INTRAVENOUS at 18:30

## 2020-09-29 RX ADMIN — SODIUM CHLORIDE 1000 ML: 9 INJECTION, SOLUTION INTRAVENOUS at 16:27

## 2020-09-29 RX ADMIN — DIPHENHYDRAMINE HYDROCHLORIDE 25 MG: 50 INJECTION, SOLUTION INTRAMUSCULAR; INTRAVENOUS at 18:50

## 2020-09-29 RX ADMIN — AMLODIPINE BESYLATE 10 MG: 10 TABLET ORAL at 21:00

## 2020-09-29 RX ADMIN — CLONIDINE HYDROCHLORIDE 0.1 MG: 0.1 TABLET ORAL at 23:56

## 2020-09-29 RX ADMIN — METHADONE HYDROCHLORIDE 70 MG: 10 CONCENTRATE ORAL at 19:30

## 2020-09-29 RX ADMIN — ONDANSETRON 4 MG: 2 INJECTION INTRAMUSCULAR; INTRAVENOUS at 16:28

## 2020-09-29 ASSESSMENT — ENCOUNTER SYMPTOMS
WOUND: 0
NAUSEA: 1
APPETITE CHANGE: 1
SEIZURES: 1
HEADACHES: 0
LIGHT-HEADEDNESS: 0
VOMITING: 1

## 2020-09-29 NOTE — ED PROVIDER NOTES
History     Chief Complaint:  Seizure      The history is provided by the patient.     Jaime Mccormick is a 25 year old male with a history of polysubstance abuse who presents for evaluation of a seizure. Fidencio is on 70 mg of methadone but has not had this in the last 3 days (today makes 4) due to transportation issues. He also snorts heroin but has also not been able to get this for the last 3 days. He has not taken his clonidine during this time. This morning he had multiple episodes of emesis and was feeling unwell. Shortly prior to arrival he was laying in his bed when he lost consciousness. He denies prodromal symptoms. However, his roommate was there and witnessed 20 seconds of full body shaking. Jaime has residual nausea without any other concerns. He denies injuries from this seizure.     Notably, Jaime does not use alcohol. He denies benzodiazepines since he went to detox for these at [facility] in July 2020.    Allergies  Amoxicillin  Metoclopramide     Medications  gabapentin  clonidine   escitalopram      Past Medical History  PTSD  Anxiety  Depression  Polysubstance abuse     Past Surgical History  The patient does not have any pertinent past surgical history.     Family History  Heart attacks    Social History:  The patient was not accompanied to the ED.  Smoking Status: Positive some day smoker  Smokeless Tobacco: Negative  Alcohol Use: Negative   Drug Use: Positive, heroin  PCP: Katie Beaulieu    Review of Systems   Constitutional: Positive for appetite change.   Gastrointestinal: Positive for nausea and vomiting.   Skin: Negative for wound.   Neurological: Positive for seizures. Negative for light-headedness and headaches.   All other systems reviewed and are negative.    Physical Exam     Patient Vitals for the past 24 hrs:   BP Temp Temp src Pulse Resp SpO2   09/29/20 1950 133/84 99  F (37.2  C) Oral 102 16 99 %   09/29/20 1845 -- -- -- 99 -- 97 %   09/29/20 1830 (!) 143/91 -- -- 96  14 99 %   09/29/20 1815 125/81 -- -- 98 13 97 %   09/29/20 1800 134/56 -- -- 113 14 91 %   09/29/20 1734 -- -- -- 101 (!) 45 99 %   09/29/20 1733 -- -- -- 111 16 99 %   09/29/20 1730 (!) 130/92 -- -- 102 9 99 %   09/29/20 1715 128/75 -- -- 108 26 99 %   09/29/20 1631 -- -- -- 96 (!) 0 93 %   09/29/20 1630 -- -- -- 94 (!) 0 96 %   09/29/20 1629 (!) 136/91 -- -- 113 16 98 %   09/29/20 1616 (!) 140/108 98.8  F (37.1  C) Oral 117 18 98 %        Physical Exam  General: Well-developed and well-nourished. Well appearing young  man. Cooperative.  Head:  Atraumatic.  Eyes:  Conjunctivae, lids, and sclerae are normal.  Neck:  Supple. Normal range of motion.  CV:  Tachycardic rate and regular rhythm. Normal heart sounds with no murmurs, rubs, or gallops detected.  Resp:  No respiratory distress. Clear to auscultation bilaterally without decreased breath sounds, wheezing, rales, or rhonchi.  GI:  Soft. Non-distended. Non-tender.    MS:  Normal ROM.  Skin:  Warm. Non-diaphoretic. No pallor.  Neuro:  Awake. A&Ox3. Normal strength. No facial droop. No dysarthria. No aphasia.  Psych: Normal mood and affect. Normal speech.  Vitals reviewed.     Emergency Department Course     EKG  Indication: Seizure  Time: 1622  Rate 98 bpm. NV interval 132. QRS duration 86. QT/QTc 338/431.   Normal sinus rhythm with sinus arrhythmia    Nonspecific T wave abnormality   Abnormal ECG  No acute ST changes.  No significant changes as compared to prior, dated 06/16/2020.     Laboratory:  Laboratory findings were communicated with the patient who voiced understanding of the findings.    CBC: WBC: 10.6, HGB: 15.4, PLT: 362  BMP: Glucose 139 (H) o/w WNL (Creatinine: 0.80)  Alcohol ethyl: <0.01  Drug Abuse Screen Urine: None Detected     COVID-19 Virus (Coronavirus), PCR NP Swab: pending       Interventions:   1627 NS 1000 mL IV   1628 Zofran 4 mg IV   1703 Zofran 4 mg IV    1829 Clonidine 0.1 mg IV  1830 NS 1000 mL IV   1850 Benadryl 25 g  IV  1930 Methadone 70 mg PO    Emergency Department Course:    1612 Nursing notes and vitals reviewed.    1622 I performed an exam of the patient as documented above.     1622 EKG obtained as noted above.     1620 IV was inserted and blood was drawn for laboratory testing, results above.    1651 The patient provided a urine sample here in the emergency department. This was sent for laboratory testing, findings above.     1755  He still feels nauseated upon recheck.     1758 I consulted with Dr. Lovell of the hospitalist service. She is in agreement to accept the patient for admission. Findings and plan explained to the patient who consents to admission. Discussed the patient with Dr. Lovell who will admit the patient to an observation bed for further monitoring, evaluation, and treatment.    1830 I spoke with Dr. Melgar of the general neurology service regarding patient's presentation, findings, and plan of care.     Impression & Plan     Medical Decision Making:  Fidencio is a 25-year-old man with opiate dependence who uses heroin in addition to methadone.  He has not had either of these in now 4 days due to lack of transportation.  He was feeling somewhat unwell with nausea and vomiting this morning and just prior to arrival his roommate witnessed him having 20 seconds of generalized shaking which prompted call to EMS.  He denies other concerns aside from persistent nausea.  He adamantly denies use of benzodiazepines or alcohol.  His exam is unremarkable aside from tachycardia which did slowly improve while under my care.  His laboratory studies are actually quite reassuring without electrolyte derangement, kidney injury, leukocytosis, anemia, or alcohol intoxication.  Drug screen is negative.  EKG is reassuring without acute ST changes or arrhythmias.  A cardiac etiology for this event seems unlikely.      It was somewhat difficult to control his vomiting for which he was given Zofran.  He was also given IV fluids  and his home clonidine which he has also been without for the last 4 days.  Ultimately I gave him his usual dose of methadone with Benadryl for his nausea which seemed to be helpful. He has an allergy to Reglan.    I am concerned by this man who had a seizure of unclear etiology as methadone and opiate withdrawal should not precipitate seizure. He seems to be going through opiate withdrawal with his persistent vomiting and tachycardia and I think he warrants observation as I would be concerned he would return home and have recurrent seizure or worsening illness.  I discussed this plan for admission with Fidencio and answered all his questions.  He verbalized understanding and is amenable.  I discussed the patient's case with Dr. Lovell who accepted admission with the request that I talk with neurology.  I discussed his case with Dr. Melgar, general neurology, who recommended MRI of the brain as part of his work-up with plan for EEG in the morning.  MRI was ordered and is pending at time of dictation.    Covid-19  Jaime Mccormick was evaluated during a global COVID-19 pandemic, which necessitated consideration that the patient might be at risk for infection with the SARS-CoV-2 virus that causes COVID-19.   Applicable protocols for evaluation were followed during the patient's care.   COVID-19 was considered as part of the patient's evaluation. The plan for testing is:  a test was obtained during this visit.    Diagnosis:     ICD-10-CM    1. Seizure (H)  R56.9    2. Opioid dependence with opioid-induced disorder (H)  F11.29    3. Noncompliance with medication regimen  Z91.14         Disposition:  Admitted to Dr. Lovell.    Scribe Disclosure:  I, Hebert Estes, am serving as a scribe at 4:15 PM on 9/29/2020 to document services personally performed by Dipika Pickett MD based on my observations and the provider's statements to me.     Dipika Ruff MD  09/29/20 1352

## 2020-09-29 NOTE — ED NOTES
Bed: ED23  Expected date:   Expected time:   Means of arrival:   Comments:  Janes Bernal Sezure 25 m

## 2020-09-29 NOTE — ED NOTES
"[facility]  ED Nurse Handoff Report    ED Chief complaint: Seizures and Withdrawal      ED Diagnosis:   Final diagnoses:   None       Code Status: Full Code    Allergies:   Allergies   Allergen Reactions     Amoxicillin Hives     Metoclopramide Hives       Patient Story: \"withdrawing from methadone\"  Focused Assessment:  Pt states that last use of methadone and heroin was Friday, states that he didn't have a ride to his methadone clinic the last 3 days. Pts roommate states that he had a \"seizure for 20 seconds today\" states his body was shaking. Pt not post ictal when EMS arrived nor at hospital. Pt has been having multiple episode of vomiting in ED. Otherwise stable.     Treatments and/or interventions provided: IVF, zofran  Patient's response to treatments and/or interventions: tolerating well.     To be done/followed up on inpatient unit:  assess withdrawal.     Does this patient have any cognitive concerns?: none    Activity level - Baseline/Home:  Independent  Activity Level - Current:   Independent    Patient's Preferred language: English   Needed?: No    Isolation: None  Infection: Not Applicable  Bariatric?: No    Vital Signs:   Vitals:    09/29/20 1715 09/29/20 1730 09/29/20 1733 09/29/20 1734   BP: 128/75 (!) 130/92     Pulse: 108 102 111 101   Resp: 26 9 16 (!) 45   Temp:       TempSrc:       SpO2: 99% 99% 99% 99%       Cardiac Rhythm:Cardiac Rhythm: Sinus tachycardia    Was the PSS-3 completed:   Yes  What interventions are required if any?               Family Comments: none present   OBS brochure/video discussed/provided to patient/family: Yes              Name of person given brochure if not patient:               Relationship to patient:     For the majority of the shift this patient's behavior was Green.   Behavioral interventions performed were .    ED NURSE PHONE NUMBER: [phone]         "

## 2020-09-29 NOTE — ED TRIAGE NOTES
"Pts roommate called as patient had \"seizure that lasted 20 seconds and then came to\". Pt is withdrawing from methadone and heroine. Last use of heroin was Friday. Last methadone Friday as well, \"haven't been able to get my methadone for a few days because I didn't have a ride'.   "

## 2020-09-29 NOTE — H&P
[facility]    History and Physical - Hospitalist Service       Date of Admission:  9/29/2020    Assessment & Plan   Jaime Mccormick is a 25 year old male with history of polysubstance abuse admitted on 9/29/2020 with new onset seizure. Admitted from 7/22-7/27 at [facility] and detoxed from benzos using phenobarbital and started on Methadone through clinic. He has been off phenobarbital for over a month and denies use of benzodiazepines or alcohol. He did snort heroine 4 days ago when he did not get his methadone. Since Friday, he has been without clonidine and phenobarbital. He is otherwise reports taking his other medications as prescribed.  He reports a history of seizure approximately a year ago after stopping methadone and clonidine. Denies history of imaging of he brain. Denies any head trauma or recent illness. Drug screen negative.     Possible seizure - likely secondary to polysubstance abuse, although patient reports no alcohol, benzo or barbiturate use to suggest withdrawal seizure. Given this was reported to last only 20 seconds had patient denies post-ictal state, ? If actual seizure.   - admit under observation  - seizure precautions, PRN ativan available  - q 4 hours neuro checks.   - given not clearly a withdrawal seizure, we discussed with neurologist on call who would like an MRI and EEG prior to consult tomorrow.     Continued heroine use  Narcotic withdrawal with nausea/vomiting and myalgias  - resume PTA clonidine and methadone  - psychiatry and chem dep consulted.   - PRN zofran, compazine and benadryl ordered for nausea.   - H/o hives with metochlopramide     Generalized anxiety disorder  - continue PTA lexapro, escitalopram and gabapentin once reconciled by pharmacy.     HTN   - continue PTA amlodipine    Elevated random blood sugar - 139  - check a1c        Diet: Regular.   DVT Prophylaxis: Low Risk/Ambulatory with no VTE prophylaxis indicated  Epstein Catheter: not  "present  Code Status: Full code         Disposition Plan   Expected discharge: Tomorrow, recommended to prior living arrangement once no seizures and cleared by neurology. .  Entered: Rose Lovell MD 09/29/2020, 6:07 PM     The patient's care was discussed with the Patient and ED Team.    Rose Lovell MD  [facility]    ______________________________________________________________________    Chief Complaint   Seizure.     History is obtained from the patient    History of Present Illness   Jaime Mccormick is a 25 year old male with history of polysubstance abuse admitted on 9/29/2020 with new onset seizure. Admitted from 7/22-7/27 at [facility] and detoxed from benzos using phenobarbital and started on Methadone through clinic. He has been off phenobarbital for over a month and denies taking any illicit drugs, barbiturates, or benzodiazepines. He states he is taking al his medications as prescribed, except for clonidine and phenobarbital. Last doses were on Friday. He reports a history of seizure approximately a year ago after stopping methadone and clonidine. Denies history of imaging of he brain. Denies any head trauma or recent illness.     Patient denied illicit drug use with me, but per ER notes he stated that when unable to make his methadone appointments he will use heroin via snorting it. He last used heroin four days ago. He was unable to get to his methadone visit on Saturday due to lack of ride and has not used heroine or methadone since that time. He started to develop withdrawal symptoms of nausea and myalgias and then felt \"everything go black.\" His roommate noted full body shaking for approximately 20 seconds. He came to again and then started to vomit. He feels he was completely with it.  Per EMS, his roommate was in his room and witnessed what he described as 20 seconds of full body shaking. Jaime notes that the only residual complaint is his current nausea.     He " notes that he has not used alcohol in a long time. He denies using Xanax or other benzodiazepines since he presented for withdrawal symptoms to [facility]. He was admitted for detox there from 07/22-07/27. He states that has been unable to eat adequately recently.    Review of Systems    The 10 point Review of Systems is negative other than noted in the HPI.     Past Medical History    I have reviewed this patient's medical history and updated it with pertinent information if needed.   Past Medical History:   Diagnosis Date     Anxiety      Chronic kidney disease      Depressive disorder      Polysubstance abuse (H) 2/1/2020     Admitted from 7/22-7/27 and detoxed from benzos using phenobarbital and Methadone clinic was contracted.     1.  Sedative hypnotic use disorder.   2.  Opiate use disorder, on methadone maintenance.   3.  Depressive disorder, not otherwise specified.   4.  Generalized anxiety disorder.     Past Surgical History   I have reviewed this patient's surgical history and updated it with pertinent information if needed.  Past Surgical History:   Procedure Laterality Date     NO HISTORY OF SURGERY         Social History   I have reviewed this patient's social history and updated it with pertinent information if needed.  Social History     Tobacco Use     Smoking status: Current Every Day Smoker     Packs/day: 0.50     Smokeless tobacco: Never Used   Substance Use Topics     Alcohol use: Yes     Comment: social     Drug use: Yes     Frequency: 7.0 times per week     Types: Opiates, Benzodiazepines       Family History   I have reviewed this patient's family history and updated it with pertinent information if needed.  Family History   Problem Relation Age of Onset     Alcoholism Mother      Breast Cancer Paternal Grandmother        Prior to Admission Medications    Prior to Admission Medications   Prescriptions Last Dose Informant Patient Reported? Taking?   PHENobarbital (LUMINAL) 32.4 MG  tablet   No No   Sig: Take 1 tablet (32.4 mg) by mouth At Bedtime Take 3 tabs at bedtime x2 days , then  2 tabs at bedtime x1 day, Take 1 tabs at bedtime x1 day   QUEtiapine (SEROQUEL) 100 MG tablet   No No   Sig: Take 1 tablet (100 mg) by mouth At Bedtime   amLODIPine (NORVASC) 10 MG tablet   No No   Sig: Take 1 tablet (10 mg) by mouth every evening   cloNIDine (CATAPRES) 0.1 MG tablet   No No   Sig: Take 1 tablet (0.1 mg) by mouth 3 times daily   escitalopram (LEXAPRO) 20 MG tablet   No No   Sig: Take 1 tablet (20 mg) by mouth daily   gabapentin (NEURONTIN) 300 MG capsule   No No   Sig: Take 1 capsule (300 mg) by mouth 2 times daily   methadone (DOLPHINE) 5 MG/5ML solution   Yes No   Sig: Take 70 mLs (70 mg) by mouth daily      Facility-Administered Medications: None       Allergies   Allergies   Allergen Reactions     Amoxicillin Hives     Metoclopramide Hives       Physical Exam   Vital Signs: Temp: 98.8  F (37.1  C) Temp src: Oral BP: (!) 130/92 Pulse: 101   Resp: (!) 45 SpO2: 99 %      Weight: 0 lbs 0 oz    Constitutional:   awake, alert, cooperative, no apparent distress, and appears stated age     Eyes:   Lids and lashes normal, pupils equal, round and reactive to light, extra ocular muscles intact, sclera clear, conjunctiva normal     ENT:   Normocephalic, without obvious abnormality, atramatic, oral pharynx with moist mucus membranes, tonsils without erythema or exudates      Neck:   Supple, symmetrical, trachea midline, no adenopathy, thyroid symmetric, not enlarged and no tenderness, skin normal     Lungs:   No increased work of breathing, good air exchange, clear to auscultation bilaterally, no crackles or wheezing     Cardiovascular:   Regular rate and rhythm, normal S1 and S2, no S3 or S4, and no murmur noted. Extremities are warm. No edema.      Abdomen:   Normal bowel sounds, soft, non-distended, non-tender, no masses palpated, no hepatosplenomegally     Musculoskeletal:   There is no redness,  warmth, or swelling of the joints. Normal build.      Neurologic:   Awake, alert, oriented to name, place and time.  Cranial nerves II-XII are grossly intact.  Moving a 4 extremities without gross focal weakness.  Cerebellar finger to nose. Sensory is intact.      Neuropsychiatric:   General: normal, calm and normal eye contact     Skin:   no bruising or bleeding, no redness, warmth, or swelling and no rashes         Data   Data reviewed today: I reviewed all medications, new labs and imaging results over the last 24 hours. I personally reviewed no images or EKG's today.    Recent Labs   Lab 09/29/20  1620   WBC 10.6   HGB 15.4   MCV 86         POTASSIUM 3.4   CHLORIDE 105   CO2 25   BUN 21   CR 0.80   ANIONGAP 6   MIROSLAVA 9.6   *

## 2020-09-30 ENCOUNTER — HOSPITAL ENCOUNTER (OUTPATIENT)
Dept: NEUROLOGY | Facility: CLINIC | Age: 25
End: 2020-09-30
Attending: INTERNAL MEDICINE
Payer: COMMERCIAL

## 2020-09-30 VITALS
SYSTOLIC BLOOD PRESSURE: 111 MMHG | TEMPERATURE: 98.1 F | RESPIRATION RATE: 16 BRPM | OXYGEN SATURATION: 98 % | DIASTOLIC BLOOD PRESSURE: 66 MMHG | HEART RATE: 66 BPM

## 2020-09-30 LAB
LABORATORY COMMENT REPORT: NORMAL
SARS-COV-2 RNA SPEC QL NAA+PROBE: NEGATIVE
SARS-COV-2 RNA SPEC QL NAA+PROBE: NORMAL
SPECIMEN SOURCE: NORMAL
SPECIMEN SOURCE: NORMAL

## 2020-09-30 PROCEDURE — 25000128 H RX IP 250 OP 636: Performed by: INTERNAL MEDICINE

## 2020-09-30 PROCEDURE — G0378 HOSPITAL OBSERVATION PER HR: HCPCS

## 2020-09-30 PROCEDURE — 99217 ZZC OBSERVATION CARE DISCHARGE: CPT | Performed by: HOSPITALIST

## 2020-09-30 PROCEDURE — 95816 EEG AWAKE AND DROWSY: CPT

## 2020-09-30 PROCEDURE — 99222 1ST HOSP IP/OBS MODERATE 55: CPT | Performed by: PSYCHIATRY & NEUROLOGY

## 2020-09-30 PROCEDURE — 40000007 ZZH STATISTIC ADULT CD FACE TO FACE-NO CHRG

## 2020-09-30 PROCEDURE — 25000132 ZZH RX MED GY IP 250 OP 250 PS 637: Performed by: INTERNAL MEDICINE

## 2020-09-30 RX ORDER — QUETIAPINE FUMARATE 100 MG/1
100 TABLET, FILM COATED ORAL AT BEDTIME
Qty: 30 TABLET | Refills: 0 | Status: ON HOLD | OUTPATIENT
Start: 2020-09-30 | End: 2023-06-08

## 2020-09-30 RX ORDER — CLONIDINE HYDROCHLORIDE 0.1 MG/1
0.1 TABLET ORAL 3 TIMES DAILY
Qty: 90 TABLET | Refills: 0 | Status: ON HOLD | OUTPATIENT
Start: 2020-09-30 | End: 2023-06-08

## 2020-09-30 RX ADMIN — POTASSIUM CHLORIDE AND SODIUM CHLORIDE: 900; 150 INJECTION, SOLUTION INTRAVENOUS at 06:58

## 2020-09-30 RX ADMIN — CLONIDINE HYDROCHLORIDE 0.1 MG: 0.1 TABLET ORAL at 15:48

## 2020-09-30 RX ADMIN — CLONIDINE HYDROCHLORIDE 0.1 MG: 0.1 TABLET ORAL at 09:21

## 2020-09-30 RX ADMIN — GABAPENTIN 300 MG: 300 CAPSULE ORAL at 15:48

## 2020-09-30 RX ADMIN — METHADONE HYDROCHLORIDE 70 MG: 10 CONCENTRATE ORAL at 13:44

## 2020-09-30 RX ADMIN — GABAPENTIN 300 MG: 300 CAPSULE ORAL at 09:21

## 2020-09-30 RX ADMIN — ESCITALOPRAM OXALATE 20 MG: 10 TABLET ORAL at 09:20

## 2020-09-30 NOTE — PROGRESS NOTES
[facility]    Medicine Progress Note - Hospitalist Service       Date of Admission:  9/29/2020  Assessment & Plan       Jaime Mccormick is a 25 year old male with history of polysubstance abuse admitted on 9/29/2020 with new onset seizure. Admitted from 7/22-7/27 at [facility] and detoxed from benzos using phenobarbital and started on Methadone through clinic. He has been off phenobarbital for over a month and denies use of benzodiazepines or alcohol. He did snort heroine 4 days ago when he did not get his methadone. Since Friday, he has been without clonidine and phenobarbital. He is otherwise reports taking his other medications as prescribed.  He reports a history of seizure approximately a year ago after stopping methadone and clonidine. Denies history of imaging of he brain. Denies any head trauma or recent illness. Drug screen negative.      Possible seizure - likely secondary to polysubstance abuse, although patient reports no alcohol, benzo or barbiturate use to suggest withdrawal seizure. Given this was reported to last only 20 seconds and patient denies post-ictal state, ? if actual seizure.   - seizure precautions, PRN ativan available  - q 4 hours neuro checks.   - given not clearly a withdrawal seizure, MRI unremarkable, EEG pending  - Neuro consultation pending     Continued heroine use  Narcotic withdrawal with nausea/vomiting and myalgias  - resume PTA clonidine and methadone  - psychiatry and chem dep consulted.   - PRN zofran, compazine and benadryl ordered for nausea.   - H/o hives with metochlopramide   - Psych rec discharge when medically stable with methadone from [place] - per CD, he can call [place] counselor to change to [facility] location for care going forward to assist in compliance      Generalized anxiety disorder  - continue PTA lexapro, escitalopram and gabapentin     HTN   - continue PTA amlodipine     Elevated random blood sugar - 139  - check a1c        Diet:  Regular Diet Adult    DVT Prophylaxis: Ambulate every shift  Epstein Catheter: not present  Code Status: Full Code           Disposition Plan   Expected discharge: possibly later today pending neuro and psych eval  Entered: Devaughn Selby MD 09/30/2020, 8:30 AM       The patient's care was discussed with the Bedside Nurse and Patient.    Devaughn Selby MD  Hospitalist Service  [facility]    ______________________________________________________________________    Interval History   Seen and examined around noon. No complaints, no f/c/n/v/cough, sob, pain, tingling, numbness or weakness. Denies dizziness even with ambulation. EEG pending, neuro consult pending. Possible discharge later today.    Data reviewed today: I reviewed all medications, new labs and imaging results over the last 24 hours. I personally reviewed no images or EKG's today.    Physical Exam   Vital Signs: Temp: 98.5  F (36.9  C) Temp src: Oral BP: 105/86 Pulse: 63   Resp: 16 SpO2: 96 % O2 Device: None (Room air)    Weight: 0 lbs 0 oz    Gen: NAD, pleasant  HEENT: Normocephalic, EOMI, MMM  Resp: no crackles,  no wheezes, no increased work of resp  CV: S1S2 heard, reg rhythm, reg rate, no pedal edema  Abdo: soft, nontender, nondistended, bowel sounds present  Ext: calves nontender, well perfused  Neuro: AAOx3, CN grossly intact, no facial asymmetry, no suicidal ideation reported      Data   Recent Labs   Lab 09/29/20  1620   WBC 10.6   HGB 15.4   MCV 86         POTASSIUM 3.4   CHLORIDE 105   CO2 25   BUN 21   CR 0.80   ANIONGAP 6   MIROSLAVA 9.6   *     Recent Results (from the past 24 hour(s))   MR Brain w/o & w Contrast    Narrative    MRI BRAIN WITHOUT AND WITH CONTRAST  9/29/2020 8:56 PM    HISTORY:  Unexplained seizure.     TECHNIQUE:  Multiplanar, multisequence MRI of the brain without and  with 7 mL Gadavist.    COMPARISON: None.    FINDINGS:  Mild motion artifact. The cerebral hemispheres, brainstem,  and  cerebellum demonstrate normal morphology and signal. No evidence  of ischemia, hemorrhage, mass, mass effect, or hydrocephalus. No  evidence of abnormal cleavage, differentiation, or migration. Temporal  lobes are symmetric with unremarkable appearance of the hippocampi,  forniceal columns, and mamillary bodies. No abnormal enhancement or  diffusion restriction. Major intracranial flow voids are maintained.    The visualized calvarium, tympanic cavities, mastoid cavities, and  paranasal sinuses are unremarkable.      Impression    IMPRESSION:  Unremarkable MRI of the head.    MINNIE DE LA PAZ MD

## 2020-09-30 NOTE — CONSULTS
"Consult Date:  09/30/2020      PSYCHIATRY CONSULTATION      REQUESTING PHYSICIAN:  Dr. Rose Lovell      REASON FOR CONSULTATION:  Narcotic addiction; ongoing heroin use despite being on methadone recently; presents with possible seizure.      IDENTIFYING DATA:  The patient is a 25-year-old  male with an established history of opiate dependence and benzodiazepine dependence, as well as generalized anxiety disorder.  He sees a prescriber, who gives him Lexapro, gabapentin and clonidine.  He was detoxed over the summer and referred to [facility] for treatment, which he never completed and eventually started going to [facility] for methadone maintenance.      CHIEF COMPLAINT:  \"I ran out of my methadone.\"      HISTORY OF PRESENT ILLNESS:  The patient is a 25-year-old  male who presents with what appears to be a witnessed seizure.  He detoxed off of benzodiazepines back in July 2020 and says he has not used benzodiazepines since then.  He apparently snorted heroin 4 days ago.  He is typically on 70 mg of methadone through [facility].  Lives in [place] and apparently drives all the way to [place] to receive his dosing.  He stopped taking the medication, could not get a ride, and acknowledges that he used heroin sporadically.  He does not have any interest in pursuing treatment.  They have resumed his methadone since coming into the hospital.  I believe he has a history of having a seizure in the distant past when he stopped methadone.  He is not currently on any phenobarbital and his detoxification off benzodiazepines was months ago.  He does not endorse any concerns about depression or anxiety, currently taking Lexapro, gabapentin and some Seroquel.  He denies any safety concerns.      On further questioning, he does not endorse any hypomania, nii, panic, OCD, eating disorder history or trauma history.  He does acknowledge chronic anxiety issues and some symptoms of depression historically.  He has been " "on Suboxone in the past and apparently did not stay sober on this.  He does not have much recollection of what happened, apparently this episode lasted roughly 20 seconds and may have been witnessed by a roommate.  His recent laboratory studies came back negative and his urine drug screen was negative.  They did an MRI of his brain, which was unremarkable.      PAST PSYCHIATRIC HISTORY:  Notable for generalized anxiety disorder/depression.      PAST CHEMICAL DEPENDENCY HISTORY:  Notable for an opiate use disorder, benzodiazepine use disorder, and past relapse, even when treated with Suboxone.  He has been in treatment a couple of times, did not complete.  Recommendations to go through [facility] and currently does not have interest in pursuing treatment.  He was detoxed at [facility] back in July.      PAST MEDICAL HISTORY:  Per chart review includes chronic kidney disease, possible history of past seizure.      PRIOR TO ADMISSION MEDICATIONS:   1.  Seroquel 100 mg at bedtime.   2.  Norvasc.   3.  Catapres.   4.  Lexapro 20 mg daily.   5.  Gabapentin 300 mg b.i.d.   6.  Methadone 70 mg daily through [facility] in [place].      FAMILY HISTORY/SOCIAL HISTORY:  The patient is single, lives with roommates, and works at [employer].  He has a high school degree.  One sister.  Never .  He denies current legal problems or  history.      REVIEW OF SYSTEMS:  A 10-point review of systems is currently negative.      MOST RECENT VITAL SIGNS:  Blood pressure 105/86, temperature 98.5, pulse 63, respiratory rate 16, oxygen saturation 96%.      MENTAL STATUS EXAMINATION:  Appearance:  The patient is a passive  male lying in bed.  He is alert, cooperative.  Speech is soft and slow.  Use of language appropriate.  Motor exam:  Calm.  Coordination, station, gait not tested.  Muscle strength and tone adequate.  Affect constricted.  Mood \"okay.\"  Thought process logical, coherent and goal-directed.  No loosening of " associations or flight of ideas.  No formal thought disorder on exam.  Thought content negative for hallucinations, delusions, paranoia, suicidal or homicidal ideation.  Insight and judgment chronically poor with respect to chemical use.  Cognitive exam:  The patient is alert and oriented x 3.  Concentration intact.  Recent memory slightly impaired.  Remote memory grossly intact.  General fund of knowledge average.      IMPRESSION:  The patient is a 25-year-old  male presenting with a witnessed seizure, established history of an opiate use disorder, benzodiazepine use disorder, anxiety and depression.  At this point, he has no interest in pursuing further treatment.  Given his high risk of relapse, continuing methadone seems appropriate, though I wonder if [facility] should be trying to set him up with a clinic closer to his residence in [place].  I do not think it is going to be realistic to have him driving all the way to [place] on a daily basis to get his medications, thus setting him up for failure.      DIAGNOSES:   1.  Opiate use disorder.   2.  Benzodiazepine use disorder.   3.  Generalized anxiety disorder by history.   4.  Rule out unspecified depressive disorder.   5.  Witnessed seizure.      PLAN:   1.  Resume outpatient medications, including methadone, gabapentin, Lexapro and Seroquel.   2.  Confirm methadone dose with [facility].  I think that we should request that they consider moving his clinic location closer to his residence in [place] to avoid this sort of mishap.  Perhaps  could communicate with them to inform them of the situation and these concerns.   3.  From a psychiatric perspective, the patient can be discharged once deemed medically stable, but he will have to obtain his subsequent doses of methadone through [facility].         TARA VILLAGOMEZ MD             D: 09/30/2020   T: 09/30/2020   MT: RICHARD      Name:     WASHINGTON CHAVEZ   MRN:      [id number]         Account:       [id number]   :      1995           Consult Date:  2020      Document: [id number]

## 2020-09-30 NOTE — PROGRESS NOTES
RECEIVING UNIT ED HANDOFF REVIEW    ED Nurse Handoff Report was reviewed by: Kennedi Knott RN on September 29, 2020 at 7:24 PM

## 2020-09-30 NOTE — CONSULTS
[facility] [place]  [facility]    Neurology Consultation    Jaime Mccormick MRN# [id number]   YOB: 1995 Age: 25 year old    Code Status:Full Code   Date of Admission: 9/29/2020  Date of Consult:09/30/2020                                                                                       Assessment and Plan:                                         #.  Episode of unresponsiveness associated with shaking movement of extremities.  Possible seizure although work-up was otherwise negative.  Possibly related to stress of methadone withdrawal combined with poor nutrition as provoking factor.  A remote episode about a year ago in the same circumstance  --After review of work-up, I would not recommend anticonvulsant therapy at this time.  Given the [place] guidelines, with there being a loss of alteration of consciousness, he would have to go 3 months before resumption of driving.  Explained to patient.  Continue to resume methadone treatment per addiction medicine.  Okay to be discharged from the neurologic standpoint.  Please contact me if there are questions with regard to discharge planning    ----------------------------------------------------------------------------------  ----------------------------------------------------------------------------------  Reason for consult: I was asked by Dr. Lovell to evaluate this patient for **New seizure.       Chief Complaint:   Does not recall event  History is obtained from the patient / chart       History of Present Illness:   This patient is a 25 year old male who presents with the following history as outlined in the hospitalist note:    history of polysubstance abuse admitted on 9/29/2020 with new onset seizure. Admitted from 7/22-7/27 at [facility] and detoxed from benzos using phenobarbital and started on Methadone through clinic. He has been off phenobarbital for over a month and denies taking any illicit  "drugs, barbiturates, or benzodiazepines. He states he is taking al his medications as prescribed, except for clonidine and phenobarbital. Last doses were on Friday. He reports a history of seizure approximately a year ago after stopping methadone and clonidine. Denies history of imaging of he brain. Denies any head trauma or recent illness.      Patient denied illicit drug use with me, but per ER notes he stated that when unable to make his methadone appointments he will use heroin via snorting it. He last used heroin four days ago. He was unable to get to his methadone visit on Saturday due to lack of ride and has not used heroine or methadone since that time. He started to develop withdrawal symptoms of nausea and myalgias and then felt \"everything go black.\" His roommate noted full body shaking for approximately 20 seconds. He came to again and then started to vomit. He feels he was completely with it.  Per EMS, his roommate was in his room and witnessed what he described as 20 seconds of full body shaking. Jaime notes that the only residual complaint is his current nausea.      He notes that he has not used alcohol in a long time. He denies using Xanax or other benzodiazepines since he presented for withdrawal symptoms to [facility]. He was admitted for detox there from 07/22-07/27. He states that has been unable to eat adequately recently  Overnight, has essentially returned to baseline although he still feels slightly dizzy and is going through some of the withdrawal from the methadone which includes some nausea.    He reports that he has a baseline shakiness of his extremities.       Past Medical History:     Past Medical History:   Diagnosis Date     Anxiety      Chronic kidney disease      Depressive disorder      Polysubstance abuse (H) 2/1/2020         Past Surgical History:     Past Surgical History:   Procedure Laterality Date     NO HISTORY OF SURGERY            Social History:     Social " History     Socioeconomic History     Marital status: Single     Spouse name: None     Number of children: None     Years of education: None     Highest education level: None   Occupational History     None   Social Needs     Financial resource strain: None     Food insecurity     Worry: None     Inability: None     Transportation needs     Medical: None     Non-medical: None   Tobacco Use     Smoking status: Current Every Day Smoker     Packs/day: 0.50     Smokeless tobacco: Never Used   Substance and Sexual Activity     Alcohol use: Yes     Comment: social     Drug use: Yes     Frequency: 7.0 times per week     Types: Opiates, Benzodiazepines     Sexual activity: Not Currently   Lifestyle     Physical activity     Days per week: None     Minutes per session: None     Stress: None   Relationships     Social connections     Talks on phone: None     Gets together: None     Attends Hinduism service: None     Active member of club or organization: None     Attends meetings of clubs or organizations: None     Relationship status: None     Intimate partner violence     Fear of current or ex partner: None     Emotionally abused: None     Physically abused: None     Forced sexual activity: None   Other Topics Concern     None   Social History Narrative     None   As described above.  He lives with friends  He reports that he was raised by 2 deaf parents.  When he has been working he worked at [employer]  He did graduate high school      Family History:     Family History   Problem Relation Age of Onset     Alcoholism Mother      Breast Cancer Paternal Grandmother      Reviewed and not felt to be contributory.  Both parents deaf.  No known family history of seizures       Home Medications:     Prior to Admission Medications   Prescriptions Last Dose Informant Patient Reported? Taking?   QUEtiapine (SEROQUEL) 100 MG tablet 9/28/2020 at Unknown time Self No Yes   Sig: Take 1 tablet (100 mg) by mouth At Bedtime   amLODIPine  (NORVASC) 10 MG tablet 9/28/2020 at Unknown time Self No Yes   Sig: Take 1 tablet (10 mg) by mouth every evening   cloNIDine (CATAPRES) 0.1 MG tablet 9/29/2020 at Unknown time Self No Yes   Sig: Take 1 tablet (0.1 mg) by mouth 3 times daily   escitalopram (LEXAPRO) 20 MG tablet 9/28/2020 at Unknown time Self No Yes   Sig: Take 1 tablet (20 mg) by mouth daily   gabapentin (NEURONTIN) 300 MG capsule 9/29/2020 at Unknown time Self Yes Yes   Sig: Take 300 mg by mouth 3 times daily   methadone (DOLPHINE) 5 MG/5ML solution 9/25/2020 at Unknown time Self Yes Yes   Sig: Take 70 mLs (70 mg) by mouth daily      Facility-Administered Medications: None          Allergy:     Allergies   Allergen Reactions     Amoxicillin Hives     Metoclopramide Hives          Inpatient Medications:   Scheduled Meds:    amLODIPine  10 mg Oral QPM     cloNIDine  0.1 mg Oral TID     escitalopram  20 mg Oral Daily     gabapentin  300 mg Oral TID     methadone  70 mg Oral QPM     QUEtiapine  100 mg Oral At Bedtime     PRN Meds: acetaminophen, acetaminophen, bisacodyl, diphenhydrAMINE **OR** diphenhydrAMINE, LORazepam, melatonin, naloxone, ondansetron **OR** ondansetron, polyethylene glycol, prochlorperazine **OR** prochlorperazine **OR** prochlorperazine, senna-docusate **OR** senna-docusate        Review of Systems    Otherwise noncontributory  CONSTITUTIONAL: negative for fever, chills, change in weight  INTEGUMENTARY/SKIN: no rash or obvious new lesions  ENT/MOUTH: no sore throat, new sinus pain or nasal drainage, no neck mass noted  RESP: No pleuretic pain, No cough, no hemoptysis, No SOB   CV: negative for chest pain, palpitations or peripheral edema  GI: Nausea related to withdrawal, some vomiting on presentation  : no dysuria or hematuria  MUSCULOSKELETAL: Myalgias consistent with withdrawal  ENDOCRINE: no history of polyuria, polydyspsia or symptoms of thyroid dysfunction  PSYCHIATRIC: Psychiatry notes reviewed underlying  anxiety  LYMPHATIC: no new lymphadenopathy  HEME: no bleeding or easy bruisability  NEURO: see HPI       Physical Exam:   Physical Exam   Vitals:  Height:Data Unavailable  Weight:0 lbs 0 oz   Temp: 98.1  F (36.7  C) Temp src: Oral BP: 113/71 Pulse: 74   Resp: 16 SpO2: 99 % O2 Device: None (Room air)    General Appearance:  No acute distress, some baseline tremulousness of the lower extremities  Neuro:       Mental Status Exam:    Alert and oriented.  Language and speech intact.  Fund of knowledge normal       Cranial Nerves:   Vision/visual fields intact to finger counting.  Pupils are equal and reactive to light.  Extraocular movements intact.  Facial strength and sensation is normal.  No jaw or tongue deviation.  Shoulder elevation symmetrical.  Hearing intact.  Fundus: Technically difficult           Motor:   Tone bulk and strength intact x4           Reflexes: Symmetrically intact.  Plantar signs normal.  No clonus       Sensory: Vibration and cold sense intact x4                   Coordination:   Finger-to-nose and heel-to-shin testing intact bilaterally gait stable.  Romberg normal no upper extremity drift       Gait: Stable  Neck: no nuchal rigidity, normal thyroid. No carotid bruits.    Cardiovascular: Regular rate and rhythm, no m/r/g    Extremities: No clubbing, no cyanosis, no edema       Data:   ROUTINE IP LABS   CBC RESULTS:     Recent Labs   Lab 09/29/20  1620   WBC 10.6   RBC 5.14   HGB 15.4   HCT 44.2        Basic Metabolic Panel:   Recent Labs   Lab Test 09/29/20  1620 07/25/20  0715 07/24/20  0653    137 139   POTASSIUM 3.4 4.0 3.3*   CHLORIDE 105 103 104   CO2 25 27 29   BUN 21 15 17   CR 0.80 0.99 1.07   * 98 89   MIROSLAVA 9.6 9.0 8.9     Liver panel:  Recent Labs   Lab Test 07/24/20  0653 07/23/20  0957 07/22/20  1758 06/16/20  0900 05/09/19  1303   PROTTOTAL 6.7* 7.0 7.5 7.7 7.9   ALBUMIN 3.0* 3.1* 3.6 4.1 4.5   BILITOTAL 0.3 0.3 0.3 0.7 0.4   ALKPHOS 155* 167* 183* 108 100   AST  17 18 27 30 51*   ALT 39 50 68 43 59     Thyroid Panel:  Recent Labs   Lab Test 06/17/20  0843 05/09/19  1303   TSH 1.18 1.19      A1C:   Recent Labs   Lab Test 09/29/20  1620   A1C 5.5   EEG reviewed-no epileptic abnormalities       IMAGING:   All imaging studies were reviewed personally    MRI brain:   -9/29-essentially normal      Starr Melgar M.D.  [facility]  Office [phone]

## 2020-09-30 NOTE — PROGRESS NOTES
CM    I: SW consult for assisting pt with switching methadone clinics was closed.  Per CD counselor, pt needs to arrange this change himself thru [facility], as there are 2 locations in the [facility]. SW provided patient with contact information to [facility] to make this change. Pt appeared to understand all information provided.    P: No SW interventions anticipated at this time.    YUE Singh   [facility]  [phone]

## 2020-09-30 NOTE — DISCHARGE SUMMARY
[facility]  Hospitalist Discharge Summary      Date of Admission:  9/29/2020  Date of Discharge:  9/30/2020  Discharging Provider: Devaughn Selby MD      Discharge Diagnoses   1. Possible seizure - episode of unresponsiveness with shaking movements  2. Polysubstance abuse  3. Generalized anxiety disorder  4. History of narcotic withdrawal  5. Hypertension  6. Elevated random blood sugar - A1c 5.5%    Follow-ups Needed After Discharge   PCP for hospital follow up  [facility]    Unresulted Labs Ordered in the Past 30 Days of this Admission     No orders found for last 31 day(s).      These results will be followed up by NA    Discharge Disposition   Discharged to home  Condition at discharge: Stable      Hospital Course   Jaime Mccormick is a 25 year old male with history of polysubstance abuse admitted on 9/29/2020 with new onset seizure. Admitted from 7/22-7/27 at [facility] and detoxed from benzos using phenobarbital and started on Methadone through clinic. He has been off phenobarbital for over a month and denies use of benzodiazepines or alcohol. He did snort heroine 4 days ago when he did not get his methadone. Since Friday, he has been without clonidine and phenobarbital. He is otherwise reports taking his other medications as prescribed.  He reports a history of seizure approximately a year ago after stopping methadone and clonidine. Denies history of imaging of he brain. Denies any head trauma or recent illness. Drug screen negative.      Possible seizure - likely secondary to polysubstance abuse, although patient reports no alcohol, benzo or barbiturate use to suggest withdrawal seizure. Given this was reported to last only 20 seconds and patient denies post-ictal state, ? if actual seizure.   - seizure precautions, PRN ativan available  - q 4 hours neuro checks.   - given not clearly a withdrawal seizure, MRI unremarkable, EEG pending  - Neuro consultation completed -  did not recommend AE medication. No driving for 3 months due to state laws and loss of consciousness.  - no SI or thoughts of self harm prior to discharge, ambulated the floor without issues and neurology was okay with discharge as well.     Continued heroine use  Narcotic withdrawal with nausea/vomiting and myalgias  - resume PTA clonidine and methadone  - psychiatry and chem dep consulted.   - PRN zofran, compazine and benadryl ordered for nausea.   - H/o hives with metochlopramide   - Psych rec discharge when medically stable with methadone from [facility] - [facility] going forward to assist in compliance      Generalized anxiety disorder  - continue PTA lexapro, escitalopram and gabapentin     HTN   - continue PTA amlodipine     Elevated random blood sugar - 139  - check a1c - 5.5% (in normal range)       Consultations This Hospital Stay   NEUROLOGY IP CONSULT  PHARMACY IP CONSULT  PSYCHIATRY IP CONSULT  CHEMICAL DEPENDENCY IP CONSULT  SOCIAL WORK IP CONSULT    Code Status   Full Code    Time Spent on this Encounter   I, Devaughn Selby MD, personally saw the patient today and spent greater than 30 minutes discharging this patient.       Devaughn Selby MD  [facility]  ______________________________________________________________________    Physical Exam   Vital Signs: Temp: 98.1  F (36.7  C) Temp src: Oral BP: 111/66 Pulse: 66   Resp: 16 SpO2: 98 % O2 Device: None (Room air)    Weight: 0 lbs 0 oz    Gen: NAD, pleasant  HEENT: Normocephalic, EOMI, MMM  Resp: no crackles,  no wheezes, no increased work of resp  CV: S1S2 heard, reg rhythm, reg rate, no pedal edema  Abdo: soft, nontender, nondistended, bowel sounds present  Ext: calves nontender, well perfused  Neuro: AAOx3, CN grossly intact, no facial asymmetry, no suicidal ideation reported            Primary Care Physician   Katie Beaulieu    Discharge Orders      Reason  for your hospital stay    Concern of new seizure     Follow-up and recommended labs and tests     PCP for hospital follow up  Continue treatment with [facility]     Activity    Your activity upon discharge: activity as tolerated - per Neurology and [place] laws, no driving for 3 months     Discharge Instructions    Continue taking your medications as directed.   Follow up with PCP as needed.  Follow up with [facility] treatment center.     Full Code     Diet    Follow this diet upon discharge: Orders Placed This Encounter      Regular Diet Adult       Significant Results and Procedures   Most Recent 3 CBC's:  Recent Labs   Lab Test 09/29/20  1620 07/23/20  0957 07/22/20  1758   WBC 10.6 9.3 12.1*   HGB 15.4 11.2* 12.1*   MCV 86 89 89    332 348     Most Recent 3 BMP's:  Recent Labs   Lab Test 09/29/20  1620 07/25/20  0715 07/24/20  0653    137 139   POTASSIUM 3.4 4.0 3.3*   CHLORIDE 105 103 104   CO2 25 27 29   BUN 21 15 17   CR 0.80 0.99 1.07   ANIONGAP 6 7 6   MIROSLAVA 9.6 9.0 8.9   * 98 89     Most Recent 6 Bacteria Isolates From Any Culture (See EPIC Reports for Culture Details):No lab results found.  Most Recent Hemoglobin A1c:  Recent Labs   Lab Test 09/29/20  1620   A1C 5.5   ,   Results for orders placed or performed during the hospital encounter of 09/29/20   MR Brain w/o & w Contrast    Narrative    MRI BRAIN WITHOUT AND WITH CONTRAST  9/29/2020 8:56 PM    HISTORY:  Unexplained seizure.     TECHNIQUE:  Multiplanar, multisequence MRI of the brain without and  with 7 mL Gadavist.    COMPARISON: None.    FINDINGS:  Mild motion artifact. The cerebral hemispheres, brainstem,  and cerebellum demonstrate normal morphology and signal. No evidence  of ischemia, hemorrhage, mass, mass effect, or hydrocephalus. No  evidence of abnormal cleavage, differentiation, or migration. Temporal  lobes are symmetric with unremarkable appearance of the hippocampi,  forniceal columns, and mamillary bodies. No abnormal  enhancement or  diffusion restriction. Major intracranial flow voids are maintained.    The visualized calvarium, tympanic cavities, mastoid cavities, and  paranasal sinuses are unremarkable.      Impression    IMPRESSION:  Unremarkable MRI of the head.    MINNIE DE LA PAZ MD       Discharge Medications   Current Discharge Medication List      CONTINUE these medications which have CHANGED    Details   cloNIDine (CATAPRES) 0.1 MG tablet Take 1 tablet (0.1 mg) by mouth 3 times daily  Qty: 90 tablet, Refills: 0    Associated Diagnoses: Chemical dependency (H)         CONTINUE these medications which have NOT CHANGED    Details   amLODIPine (NORVASC) 10 MG tablet Take 1 tablet (10 mg) by mouth every evening  Qty: 30 tablet, Refills: 0    Associated Diagnoses: Benzodiazepine withdrawal without complication (H)      escitalopram (LEXAPRO) 20 MG tablet Take 1 tablet (20 mg) by mouth daily  Qty: 30 tablet, Refills: 0    Associated Diagnoses: PTSD (post-traumatic stress disorder)      gabapentin (NEURONTIN) 300 MG capsule Take 300 mg by mouth 3 times daily      methadone (DOLPHINE) 5 MG/5ML solution Take 70 mLs (70 mg) by mouth daily  Refills: 0    Comments: Pt gets methadone at [facility]      QUEtiapine (SEROQUEL) 100 MG tablet Take 1 tablet (100 mg) by mouth At Bedtime  Qty: 30 tablet, Refills: 0    Associated Diagnoses: PTSD (post-traumatic stress disorder)           Allergies   Allergies   Allergen Reactions     Amoxicillin Hives     Metoclopramide Hives

## 2020-09-30 NOTE — PHARMACY-ADMISSION MEDICATION HISTORY
Pharmacy Medication History  Admission medication history interview status for the 9/29/2020  admission is complete. See EPIC admission navigator for prior to admission medications     Medication history sources: Patient and Sure scripts   Medication history source reliability: Good  Adherence assessment: Good    Significant changes made to the medication list:  Discontinued: phenobarbital   New:  Changed:           Additional medication history information:     Patient gets his methadone at [facility] in [place] [phone]. Will need to call to confirm methadone dose   Medication reconciliation completed by provider prior to medication history? No    Time spent in this activity: 20min       Prior to Admission medications    Medication Sig Last Dose Taking? Auth Provider   amLODIPine (NORVASC) 10 MG tablet Take 1 tablet (10 mg) by mouth every evening 9/28/2020 at Unknown time Yes Aidee Douglas MD   cloNIDine (CATAPRES) 0.1 MG tablet Take 1 tablet (0.1 mg) by mouth 3 times daily 9/29/2020 at Unknown time Yes Aidee Douglas MD   escitalopram (LEXAPRO) 20 MG tablet Take 1 tablet (20 mg) by mouth daily 9/28/2020 at Unknown time Yes Aidee Douglas MD   gabapentin (NEURONTIN) 300 MG capsule Take 300 mg by mouth 3 times daily 9/29/2020 at Unknown time Yes Unknown, Entered By History   methadone (DOLPHINE) 5 MG/5ML solution Take 70 mLs (70 mg) by mouth daily 9/28/2020 at Unknown time Yes Aidee Douglas MD   QUEtiapine (SEROQUEL) 100 MG tablet Take 1 tablet (100 mg) by mouth At Bedtime 9/28/2020 at Unknown time Yes Aidee Douglas MD Marci Jacobson PharmD

## 2020-09-30 NOTE — PROGRESS NOTES
Pt AO4.  SBA for seizure precautions.  Voiding adequately.  VAA on RA.  No seizure activity noted.  Went over discharge instructions with patient.  Patient left unit at 1715 with discharge meds and all of his belongings.

## 2020-09-30 NOTE — CONSULTS
9/30/2020    CD consult acknowledged.  Per psych consult, pt is not interested in pursing treatment at this time.   Patient gets methadone with [facility] in [place]. Patient can call his counselor in [place] to change locations to [facility]. Patient likely needs to call [facility] and set up a time for intake.     [facility]  [address], [place] [address]  Phone: [phone]    [place]  [address], [place] [address]  Phone: [phone]  Fax: [phone]    [email or web address]  [phone]

## 2020-10-01 ENCOUNTER — TELEPHONE (OUTPATIENT)
Dept: FAMILY MEDICINE | Facility: CLINIC | Age: 25
End: 2020-10-01

## 2020-10-01 NOTE — TELEPHONE ENCOUNTER
ED / Discharge Outreach Protocol    Patient Contact    Attempt # 1    Was call answered?  No.  Mailbox full. Unable to leave message.

## 2020-10-02 NOTE — TELEPHONE ENCOUNTER
Patient Contact    Attempt # 2    Was call answered?  No.  Unable to leave message.    On call back:    -Hospital follow up

## 2020-10-05 NOTE — TELEPHONE ENCOUNTER
Patient Contact    Attempt # 3    Was call answered?  No.  Left message on voicemail with information to call triage back.    On call back:   -Hospital follow-up

## 2022-07-08 NOTE — PLAN OF CARE
on unit talking with Mariana Daisyks (father of baby) and family. Behavioral Team Discussion: (5/10/2019)    Continued Stay Criteria/Rationale: Patient admitted for opiate and alcohol Use Disorder.  Plan: The following services will be provided to the patient; psychiatric assessment, medication management, therapeutic milieu, individual and group support, and skills groups.   Participants: 3A Provider: Dr. Aidee Douglas MD; 3A RN's: Timoteo Hester, RN; 3A CM's: Emmy De Leon .  Summary/Recommendation: Providers will assess today for treatment recommendations, discharge planning, and aftercare plans. CM will meet with pt for discharge planning.   Medical/Physical: Deferred (see medical notes).  Precautions:   Behavioral Orders   Procedures     Code 1 - Restrict to Unit     Routine Programming     As clinically indicated     Status 15     Every 15 minutes.     Withdrawal precautions     Rationale for change in precautions or plan: N/A  Progress: No Change.

## 2023-06-06 ENCOUNTER — HOSPITAL ENCOUNTER (INPATIENT)
Facility: CLINIC | Age: 28
LOS: 2 days | Discharge: OTHER INSTITUTION | End: 2023-06-08
Attending: FAMILY MEDICINE | Admitting: STUDENT IN AN ORGANIZED HEALTH CARE EDUCATION/TRAINING PROGRAM
Payer: COMMERCIAL

## 2023-06-06 DIAGNOSIS — Z11.52 ENCOUNTER FOR SCREENING LABORATORY TESTING FOR SEVERE ACUTE RESPIRATORY SYNDROME CORONAVIRUS 2 (SARS-COV-2): ICD-10-CM

## 2023-06-06 DIAGNOSIS — F11.20 FENTANYL DEPENDENCE (H): ICD-10-CM

## 2023-06-06 DIAGNOSIS — F13.20 BENZODIAZEPINE DEPENDENCE (H): ICD-10-CM

## 2023-06-06 DIAGNOSIS — F13.930 BENZODIAZEPINE WITHDRAWAL WITHOUT COMPLICATION (H): ICD-10-CM

## 2023-06-06 DIAGNOSIS — F19.20 CHEMICAL DEPENDENCY (H): ICD-10-CM

## 2023-06-06 DIAGNOSIS — F43.10 PTSD (POST-TRAUMATIC STRESS DISORDER): ICD-10-CM

## 2023-06-06 DIAGNOSIS — R45.851 SUICIDAL IDEATION: ICD-10-CM

## 2023-06-06 DIAGNOSIS — F41.9 ANXIETY DISORDER, UNSPECIFIED TYPE: Primary | ICD-10-CM

## 2023-06-06 DIAGNOSIS — E87.6 HYPOKALEMIA: ICD-10-CM

## 2023-06-06 LAB
ALBUMIN SERPL BCG-MCNC: 5.4 G/DL (ref 3.5–5.2)
ALP SERPL-CCNC: 104 U/L (ref 40–129)
ALT SERPL W P-5'-P-CCNC: 17 U/L (ref 10–50)
AMPHETAMINES UR QL SCN: NORMAL
ANION GAP SERPL CALCULATED.3IONS-SCNC: 20 MMOL/L (ref 7–15)
AST SERPL W P-5'-P-CCNC: 16 U/L (ref 10–50)
BARBITURATES UR QL SCN: NORMAL
BASOPHILS # BLD MANUAL: 0 10E3/UL (ref 0–0.2)
BASOPHILS NFR BLD MANUAL: 0 %
BENZODIAZ UR QL SCN: NORMAL
BILIRUB SERPL-MCNC: 1.1 MG/DL
BUN SERPL-MCNC: 40.9 MG/DL (ref 6–20)
BZE UR QL SCN: NORMAL
CALCIUM SERPL-MCNC: 10.2 MG/DL (ref 8.6–10)
CANNABINOIDS UR QL SCN: NORMAL
CHLORIDE SERPL-SCNC: 76 MMOL/L (ref 98–107)
CREAT SERPL-MCNC: 1.47 MG/DL (ref 0.67–1.17)
DEPRECATED HCO3 PLAS-SCNC: 35 MMOL/L (ref 22–29)
EOSINOPHIL # BLD MANUAL: 0.2 10E3/UL (ref 0–0.7)
EOSINOPHIL NFR BLD MANUAL: 1 %
ERYTHROCYTE [DISTWIDTH] IN BLOOD BY AUTOMATED COUNT: 12.3 % (ref 10–15)
GFR SERPL CREATININE-BSD FRML MDRD: 66 ML/MIN/1.73M2
GLUCOSE SERPL-MCNC: 140 MG/DL (ref 70–99)
HCT VFR BLD AUTO: 49 % (ref 40–53)
HGB BLD-MCNC: 17.4 G/DL (ref 13.3–17.7)
HOLD SPECIMEN: NORMAL
LIPASE SERPL-CCNC: 41 U/L (ref 13–60)
LYMPHOCYTES # BLD MANUAL: 4.3 10E3/UL (ref 0.8–5.3)
LYMPHOCYTES NFR BLD MANUAL: 25 %
MAGNESIUM SERPL-MCNC: 3.2 MG/DL (ref 1.7–2.3)
MCH RBC QN AUTO: 29.9 PG (ref 26.5–33)
MCHC RBC AUTO-ENTMCNC: 35.5 G/DL (ref 31.5–36.5)
MCV RBC AUTO: 84 FL (ref 78–100)
MONOCYTES # BLD MANUAL: 1.9 10E3/UL (ref 0–1.3)
MONOCYTES NFR BLD MANUAL: 11 %
NEUTROPHILS # BLD MANUAL: 10.9 10E3/UL (ref 1.6–8.3)
NEUTROPHILS NFR BLD MANUAL: 63 %
OPIATES UR QL SCN: NORMAL
PLAT MORPH BLD: ABNORMAL
PLATELET # BLD AUTO: 408 10E3/UL (ref 150–450)
POTASSIUM SERPL-SCNC: 2.4 MMOL/L (ref 3.4–5.3)
PROT SERPL-MCNC: 8.4 G/DL (ref 6.4–8.3)
RBC # BLD AUTO: 5.81 10E6/UL (ref 4.4–5.9)
RBC MORPH BLD: ABNORMAL
SARS-COV-2 RNA RESP QL NAA+PROBE: NEGATIVE
SODIUM SERPL-SCNC: 131 MMOL/L (ref 136–145)
WBC # BLD AUTO: 17.3 10E3/UL (ref 4–11)

## 2023-06-06 PROCEDURE — 99222 1ST HOSP IP/OBS MODERATE 55: CPT | Performed by: STUDENT IN AN ORGANIZED HEALTH CARE EDUCATION/TRAINING PROGRAM

## 2023-06-06 PROCEDURE — 250N000013 HC RX MED GY IP 250 OP 250 PS 637: Performed by: FAMILY MEDICINE

## 2023-06-06 PROCEDURE — 85027 COMPLETE CBC AUTOMATED: CPT | Performed by: FAMILY MEDICINE

## 2023-06-06 PROCEDURE — 80307 DRUG TEST PRSMV CHEM ANLYZR: CPT | Performed by: FAMILY MEDICINE

## 2023-06-06 PROCEDURE — 83735 ASSAY OF MAGNESIUM: CPT | Performed by: FAMILY MEDICINE

## 2023-06-06 PROCEDURE — 258N000003 HC RX IP 258 OP 636: Performed by: FAMILY MEDICINE

## 2023-06-06 PROCEDURE — 120N000002 HC R&B MED SURG/OB UMMC

## 2023-06-06 PROCEDURE — 99285 EMERGENCY DEPT VISIT HI MDM: CPT | Performed by: FAMILY MEDICINE

## 2023-06-06 PROCEDURE — 96374 THER/PROPH/DIAG INJ IV PUSH: CPT | Performed by: FAMILY MEDICINE

## 2023-06-06 PROCEDURE — 99285 EMERGENCY DEPT VISIT HI MDM: CPT | Mod: 25 | Performed by: FAMILY MEDICINE

## 2023-06-06 PROCEDURE — 250N000011 HC RX IP 250 OP 636: Performed by: FAMILY MEDICINE

## 2023-06-06 PROCEDURE — 83690 ASSAY OF LIPASE: CPT | Performed by: FAMILY MEDICINE

## 2023-06-06 PROCEDURE — 87635 SARS-COV-2 COVID-19 AMP PRB: CPT | Performed by: FAMILY MEDICINE

## 2023-06-06 PROCEDURE — HZ2ZZZZ DETOXIFICATION SERVICES FOR SUBSTANCE ABUSE TREATMENT: ICD-10-PCS | Performed by: STUDENT IN AN ORGANIZED HEALTH CARE EDUCATION/TRAINING PROGRAM

## 2023-06-06 PROCEDURE — 36415 COLL VENOUS BLD VENIPUNCTURE: CPT | Performed by: FAMILY MEDICINE

## 2023-06-06 PROCEDURE — 85007 BL SMEAR W/DIFF WBC COUNT: CPT | Performed by: FAMILY MEDICINE

## 2023-06-06 PROCEDURE — 80053 COMPREHEN METABOLIC PANEL: CPT | Performed by: FAMILY MEDICINE

## 2023-06-06 RX ORDER — POTASSIUM CHLORIDE 1.5 G/1.58G
40 POWDER, FOR SOLUTION ORAL ONCE
Status: COMPLETED | OUTPATIENT
Start: 2023-06-06 | End: 2023-06-06

## 2023-06-06 RX ORDER — SODIUM CHLORIDE 9 MG/ML
INJECTION, SOLUTION INTRAVENOUS CONTINUOUS
Status: DISCONTINUED | OUTPATIENT
Start: 2023-06-06 | End: 2023-06-07

## 2023-06-06 RX ORDER — POTASSIUM CHLORIDE 7.45 MG/ML
10 INJECTION INTRAVENOUS ONCE
Status: COMPLETED | OUTPATIENT
Start: 2023-06-06 | End: 2023-06-07

## 2023-06-06 RX ORDER — ONDANSETRON 2 MG/ML
4 INJECTION INTRAMUSCULAR; INTRAVENOUS ONCE
Status: COMPLETED | OUTPATIENT
Start: 2023-06-06 | End: 2023-06-06

## 2023-06-06 RX ADMIN — SODIUM CHLORIDE 1000 ML: 9 INJECTION, SOLUTION INTRAVENOUS at 23:29

## 2023-06-06 RX ADMIN — ONDANSETRON 4 MG: 2 INJECTION INTRAMUSCULAR; INTRAVENOUS at 22:14

## 2023-06-06 RX ADMIN — POTASSIUM CHLORIDE 40 MEQ: 1.5 POWDER, FOR SOLUTION ORAL at 23:31

## 2023-06-06 RX ADMIN — POTASSIUM CHLORIDE 10 MEQ: 7.46 INJECTION, SOLUTION INTRAVENOUS at 23:29

## 2023-06-06 ASSESSMENT — ACTIVITIES OF DAILY LIVING (ADL)
ADLS_ACUITY_SCORE: 35
ADLS_ACUITY_SCORE: 35

## 2023-06-06 NOTE — LETTER
Recipient: [place]          Sender: YUE Sheppard [phone]          Date: June 8, 2023  Patient Name:  Jaime Mccormick  Patient YOB: 1995  Routing Message:  Please see attached Physician Orders.        The documents accompanying this notice contain confidential information belonging to the sender.  This information is intended only for the use of the individual or entity named above.  The authorized recipient of this information is prohibited from disclosing this information to any other party and is required to destroy the information after its stated need has been fulfilled, unless otherwise required by state law.    If you are not the intended recipient, you are hereby notified that any disclosure, copy, distribution or action taken in reliance on the contents of these documents is strictly prohibited.  If you have received this document in error, please return it by fax to [phone] with a note on the cover sheet explaining why you are returning it (e.g. not your patient, not your provider, etc.).  If you need further assistance, please call .  Documents may also be returned by mail to [employer], , [facility] [address] [place], [place] [address].

## 2023-06-07 LAB
ANION GAP SERPL CALCULATED.3IONS-SCNC: 11 MMOL/L (ref 7–15)
ANION GAP SERPL CALCULATED.3IONS-SCNC: 14 MMOL/L (ref 7–15)
BUN SERPL-MCNC: 23.8 MG/DL (ref 6–20)
BUN SERPL-MCNC: 32.4 MG/DL (ref 6–20)
CALCIUM SERPL-MCNC: 8.9 MG/DL (ref 8.6–10)
CALCIUM SERPL-MCNC: 9 MG/DL (ref 8.6–10)
CHLORIDE SERPL-SCNC: 86 MMOL/L (ref 98–107)
CHLORIDE SERPL-SCNC: 92 MMOL/L (ref 98–107)
CREAT SERPL-MCNC: 1.27 MG/DL (ref 0.67–1.17)
CREAT SERPL-MCNC: 1.3 MG/DL (ref 0.67–1.17)
DEPRECATED HCO3 PLAS-SCNC: 30 MMOL/L (ref 22–29)
DEPRECATED HCO3 PLAS-SCNC: 32 MMOL/L (ref 22–29)
ERYTHROCYTE [DISTWIDTH] IN BLOOD BY AUTOMATED COUNT: 12.4 % (ref 10–15)
GFR SERPL CREATININE-BSD FRML MDRD: 77 ML/MIN/1.73M2
GFR SERPL CREATININE-BSD FRML MDRD: 79 ML/MIN/1.73M2
GLUCOSE SERPL-MCNC: 117 MG/DL (ref 70–99)
GLUCOSE SERPL-MCNC: 147 MG/DL (ref 70–99)
HCT VFR BLD AUTO: 43.1 % (ref 40–53)
HGB BLD-MCNC: 15.2 G/DL (ref 13.3–17.7)
MAGNESIUM SERPL-MCNC: 2.9 MG/DL (ref 1.7–2.3)
MCH RBC QN AUTO: 29.6 PG (ref 26.5–33)
MCHC RBC AUTO-ENTMCNC: 35.3 G/DL (ref 31.5–36.5)
MCV RBC AUTO: 84 FL (ref 78–100)
PLATELET # BLD AUTO: 317 10E3/UL (ref 150–450)
POTASSIUM SERPL-SCNC: 2.7 MMOL/L (ref 3.4–5.3)
POTASSIUM SERPL-SCNC: 3.3 MMOL/L (ref 3.4–5.3)
POTASSIUM SERPL-SCNC: 3.5 MMOL/L (ref 3.4–5.3)
POTASSIUM SERPL-SCNC: 3.7 MMOL/L (ref 3.4–5.3)
POTASSIUM SERPL-SCNC: 3.7 MMOL/L (ref 3.4–5.3)
RBC # BLD AUTO: 5.14 10E6/UL (ref 4.4–5.9)
SODIUM SERPL-SCNC: 132 MMOL/L (ref 136–145)
SODIUM SERPL-SCNC: 133 MMOL/L (ref 136–145)
WBC # BLD AUTO: 14.6 10E3/UL (ref 4–11)

## 2023-06-07 PROCEDURE — 250N000013 HC RX MED GY IP 250 OP 250 PS 637: Performed by: PSYCHIATRY & NEUROLOGY

## 2023-06-07 PROCEDURE — 250N000011 HC RX IP 250 OP 636: Performed by: INTERNAL MEDICINE

## 2023-06-07 PROCEDURE — 250N000013 HC RX MED GY IP 250 OP 250 PS 637: Performed by: FAMILY MEDICINE

## 2023-06-07 PROCEDURE — 36415 COLL VENOUS BLD VENIPUNCTURE: CPT | Performed by: STUDENT IN AN ORGANIZED HEALTH CARE EDUCATION/TRAINING PROGRAM

## 2023-06-07 PROCEDURE — 258N000003 HC RX IP 258 OP 636: Performed by: FAMILY MEDICINE

## 2023-06-07 PROCEDURE — 99254 IP/OBS CNSLTJ NEW/EST MOD 60: CPT | Performed by: PSYCHIATRY & NEUROLOGY

## 2023-06-07 PROCEDURE — 250N000013 HC RX MED GY IP 250 OP 250 PS 637: Performed by: INTERNAL MEDICINE

## 2023-06-07 PROCEDURE — 84132 ASSAY OF SERUM POTASSIUM: CPT | Performed by: STUDENT IN AN ORGANIZED HEALTH CARE EDUCATION/TRAINING PROGRAM

## 2023-06-07 PROCEDURE — 93005 ELECTROCARDIOGRAM TRACING: CPT

## 2023-06-07 PROCEDURE — 85027 COMPLETE CBC AUTOMATED: CPT | Performed by: STUDENT IN AN ORGANIZED HEALTH CARE EDUCATION/TRAINING PROGRAM

## 2023-06-07 PROCEDURE — 250N000013 HC RX MED GY IP 250 OP 250 PS 637: Performed by: STUDENT IN AN ORGANIZED HEALTH CARE EDUCATION/TRAINING PROGRAM

## 2023-06-07 PROCEDURE — 83735 ASSAY OF MAGNESIUM: CPT | Performed by: INTERNAL MEDICINE

## 2023-06-07 PROCEDURE — 120N000002 HC R&B MED SURG/OB UMMC

## 2023-06-07 PROCEDURE — 84132 ASSAY OF SERUM POTASSIUM: CPT | Performed by: INTERNAL MEDICINE

## 2023-06-07 PROCEDURE — 36415 COLL VENOUS BLD VENIPUNCTURE: CPT | Performed by: INTERNAL MEDICINE

## 2023-06-07 PROCEDURE — 99232 SBSQ HOSP IP/OBS MODERATE 35: CPT | Performed by: INTERNAL MEDICINE

## 2023-06-07 PROCEDURE — 80048 BASIC METABOLIC PNL TOTAL CA: CPT | Performed by: STUDENT IN AN ORGANIZED HEALTH CARE EDUCATION/TRAINING PROGRAM

## 2023-06-07 PROCEDURE — 999N000216 HC STATISTIC ADULT CD FACE TO FACE-NO CHRG

## 2023-06-07 PROCEDURE — 258N000003 HC RX IP 258 OP 636: Performed by: STUDENT IN AN ORGANIZED HEALTH CARE EDUCATION/TRAINING PROGRAM

## 2023-06-07 RX ORDER — GABAPENTIN 300 MG/1
300 CAPSULE ORAL 3 TIMES DAILY
Status: DISCONTINUED | OUTPATIENT
Start: 2023-06-07 | End: 2023-06-07

## 2023-06-07 RX ORDER — POTASSIUM CHLORIDE 1.5 G/1.58G
40 POWDER, FOR SOLUTION ORAL ONCE
Status: COMPLETED | OUTPATIENT
Start: 2023-06-07 | End: 2023-06-07

## 2023-06-07 RX ORDER — ESCITALOPRAM OXALATE 10 MG/1
20 TABLET ORAL DAILY
Status: DISCONTINUED | OUTPATIENT
Start: 2023-06-07 | End: 2023-06-07

## 2023-06-07 RX ORDER — CLONIDINE HYDROCHLORIDE 0.1 MG/1
0.1 TABLET ORAL 3 TIMES DAILY
Status: DISCONTINUED | OUTPATIENT
Start: 2023-06-07 | End: 2023-06-08 | Stop reason: HOSPADM

## 2023-06-07 RX ORDER — ONDANSETRON 2 MG/ML
4 INJECTION INTRAMUSCULAR; INTRAVENOUS EVERY 6 HOURS PRN
Status: DISCONTINUED | OUTPATIENT
Start: 2023-06-07 | End: 2023-06-08 | Stop reason: HOSPADM

## 2023-06-07 RX ORDER — ESCITALOPRAM OXALATE 10 MG/1
10 TABLET ORAL DAILY
Status: DISCONTINUED | OUTPATIENT
Start: 2023-06-07 | End: 2023-06-07

## 2023-06-07 RX ORDER — ESCITALOPRAM OXALATE 10 MG/1
20 TABLET ORAL DAILY
Status: DISCONTINUED | OUTPATIENT
Start: 2023-06-08 | End: 2023-06-08 | Stop reason: HOSPADM

## 2023-06-07 RX ORDER — CLONIDINE HYDROCHLORIDE 0.1 MG/1
0.1 TABLET ORAL 3 TIMES DAILY
Status: DISCONTINUED | OUTPATIENT
Start: 2023-06-07 | End: 2023-06-07

## 2023-06-07 RX ORDER — SODIUM CHLORIDE, SODIUM LACTATE, POTASSIUM CHLORIDE, CALCIUM CHLORIDE 600; 310; 30; 20 MG/100ML; MG/100ML; MG/100ML; MG/100ML
INJECTION, SOLUTION INTRAVENOUS CONTINUOUS
Status: DISCONTINUED | OUTPATIENT
Start: 2023-06-07 | End: 2023-06-08

## 2023-06-07 RX ORDER — QUETIAPINE FUMARATE 50 MG/1
100 TABLET, FILM COATED ORAL AT BEDTIME
Status: DISCONTINUED | OUTPATIENT
Start: 2023-06-07 | End: 2023-06-08 | Stop reason: HOSPADM

## 2023-06-07 RX ORDER — NICOTINE 21 MG/24HR
1 PATCH, TRANSDERMAL 24 HOURS TRANSDERMAL DAILY
Status: DISCONTINUED | OUTPATIENT
Start: 2023-06-07 | End: 2023-06-07

## 2023-06-07 RX ORDER — LIDOCAINE 40 MG/G
CREAM TOPICAL
Status: DISCONTINUED | OUTPATIENT
Start: 2023-06-07 | End: 2023-06-08 | Stop reason: HOSPADM

## 2023-06-07 RX ORDER — NICOTINE 21 MG/24HR
1 PATCH, TRANSDERMAL 24 HOURS TRANSDERMAL DAILY
Status: DISCONTINUED | OUTPATIENT
Start: 2023-06-07 | End: 2023-06-08 | Stop reason: HOSPADM

## 2023-06-07 RX ORDER — GABAPENTIN 300 MG/1
300 CAPSULE ORAL 3 TIMES DAILY
Status: DISCONTINUED | OUTPATIENT
Start: 2023-06-07 | End: 2023-06-08 | Stop reason: HOSPADM

## 2023-06-07 RX ORDER — POTASSIUM CHLORIDE 1.5 G/1.58G
20 POWDER, FOR SOLUTION ORAL ONCE
Status: COMPLETED | OUTPATIENT
Start: 2023-06-07 | End: 2023-06-07

## 2023-06-07 RX ORDER — PHENOBARBITAL 64.8 MG/1
64.8 TABLET ORAL 3 TIMES DAILY
Status: DISCONTINUED | OUTPATIENT
Start: 2023-06-07 | End: 2023-06-08 | Stop reason: HOSPADM

## 2023-06-07 RX ADMIN — ONDANSETRON 4 MG: 2 INJECTION INTRAMUSCULAR; INTRAVENOUS at 13:19

## 2023-06-07 RX ADMIN — PHENOBARBITAL 64.8 MG: 64.8 TABLET ORAL at 20:05

## 2023-06-07 RX ADMIN — PHENOBARBITAL 64.8 MG: 64.8 TABLET ORAL at 14:11

## 2023-06-07 RX ADMIN — NICOTINE 1 PATCH: 14 PATCH, EXTENDED RELEASE TRANSDERMAL at 03:27

## 2023-06-07 RX ADMIN — GABAPENTIN 300 MG: 300 CAPSULE ORAL at 04:00

## 2023-06-07 RX ADMIN — POTASSIUM CHLORIDE 40 MEQ: 1.5 POWDER, FOR SOLUTION ORAL at 04:32

## 2023-06-07 RX ADMIN — POTASSIUM CHLORIDE 20 MEQ: 1.5 POWDER, FOR SOLUTION ORAL at 06:43

## 2023-06-07 RX ADMIN — GABAPENTIN 300 MG: 300 CAPSULE ORAL at 12:26

## 2023-06-07 RX ADMIN — GABAPENTIN 300 MG: 300 CAPSULE ORAL at 20:05

## 2023-06-07 RX ADMIN — ESCITALOPRAM OXALATE 10 MG: 10 TABLET ORAL at 08:44

## 2023-06-07 RX ADMIN — SODIUM CHLORIDE: 9 INJECTION, SOLUTION INTRAVENOUS at 00:29

## 2023-06-07 RX ADMIN — CLONIDINE HYDROCHLORIDE 0.1 MG: 0.1 TABLET ORAL at 12:26

## 2023-06-07 RX ADMIN — POTASSIUM CHLORIDE 40 MEQ: 1.5 POWDER, FOR SOLUTION ORAL at 13:20

## 2023-06-07 RX ADMIN — CLONIDINE HYDROCHLORIDE 0.1 MG: 0.1 TABLET ORAL at 20:05

## 2023-06-07 RX ADMIN — PHENOBARBITAL 64.8 MG: 64.8 TABLET ORAL at 08:44

## 2023-06-07 RX ADMIN — SODIUM CHLORIDE, POTASSIUM CHLORIDE, SODIUM LACTATE AND CALCIUM CHLORIDE: 600; 310; 30; 20 INJECTION, SOLUTION INTRAVENOUS at 13:30

## 2023-06-07 RX ADMIN — QUETIAPINE FUMARATE 100 MG: 50 TABLET ORAL at 22:01

## 2023-06-07 RX ADMIN — PHENOBARBITAL 64.8 MG: 64.8 TABLET ORAL at 00:28

## 2023-06-07 RX ADMIN — CLONIDINE HYDROCHLORIDE 0.1 MG: 0.1 TABLET ORAL at 04:00

## 2023-06-07 RX ADMIN — SODIUM CHLORIDE, POTASSIUM CHLORIDE, SODIUM LACTATE AND CALCIUM CHLORIDE: 600; 310; 30; 20 INJECTION, SOLUTION INTRAVENOUS at 03:28

## 2023-06-07 ASSESSMENT — ACTIVITIES OF DAILY LIVING (ADL)
ADLS_ACUITY_SCORE: 18
CHANGE_IN_FUNCTIONAL_STATUS_SINCE_ONSET_OF_CURRENT_ILLNESS/INJURY: NO
ADLS_ACUITY_SCORE: 35
ADLS_ACUITY_SCORE: 18
TOILETING_ISSUES: NO
ADLS_ACUITY_SCORE: 18
CONCENTRATING,_REMEMBERING_OR_MAKING_DECISIONS_DIFFICULTY: NO
WALKING_OR_CLIMBING_STAIRS_DIFFICULTY: NO
FALL_HISTORY_WITHIN_LAST_SIX_MONTHS: NO
WEAR_GLASSES_OR_BLIND: NO
ADLS_ACUITY_SCORE: 18
DIFFICULTY_EATING/SWALLOWING: NO
ADLS_ACUITY_SCORE: 18
DOING_ERRANDS_INDEPENDENTLY_DIFFICULTY: NO
ADLS_ACUITY_SCORE: 18
DRESSING/BATHING_DIFFICULTY: NO
ADLS_ACUITY_SCORE: 18

## 2023-06-07 NOTE — ED TRIAGE NOTES
Patient presents seeking detox for benzos and fentanyl. Patient reports not being able to sleep for 6 days. Patient last used benzos 7 days ago and last used fentanyl 3 days ago. Patient was attempting to quit cold turkey at home, however withdrawals are getting worse. Patient reports attempting to use Suboxone at home with no relief.     Triage Assessment     Row Name 06/06/23 2025       Triage Assessment (Adult)    Airway WDL WDL       Respiratory WDL    Respiratory WDL WDL       Skin Circulation/Temperature WDL    Skin Circulation/Temperature WDL WDL       Cardiac WDL    Cardiac WDL WDL       Peripheral/Neurovascular WDL    Peripheral Neurovascular WDL WDL       Cognitive/Neuro/Behavioral WDL    Cognitive/Neuro/Behavioral WDL WDL

## 2023-06-07 NOTE — PROGRESS NOTES
Patient has been educated on potential risks of choosing to leave the unit and that the responsibility for patient well-being will belong to the patient. Pt has been informed that admission to hospital is due to need for medical treatment. Education given to the patient on some of the potential risks included but are not limited to:      - lack of access to nursing intervention      - possible missed appointments with MD, therapies, tests      - possible missed medications, antibiotics, management of IV's     Patient Response: Patient wants to go outside to smoke a cigarette.      Patient notified staff prior to leaving unit: yes.    Coban wrap placed over IV prior to pt leaving unit; yes.

## 2023-06-07 NOTE — ED NOTES
Patient moved to room 9, placed on monitor and EKG obtained. Medications given per MAR. No wants or needs at this time. Pt calm and cooperative. Will continue to monitor.

## 2023-06-07 NOTE — CONSULTS
PSYCHIATRIC CONSULTATION    Requesting Physician: Lalo Petty MD    Admission Date: 06/06/2023  Date of Service: 06/07/2023    The patient was seen, his chart reviewed, report to follow.    Dx: Depression NOS         Anxiety Disorder NOS         Benzodiazepine Use Disorder           Opioid Use Disorder         Opiate and Benzodiazepine Withdrawal Syndrome      Plan: Continue Phenobarbital in the same doses. Consider restarting Suboxone. Resume Lexapro in its PTA dose of 20 mg QAM. Resume Seroquel 100 mg at bedtime. Continue Gabapentin and Clonidine in the same doses  No need to transfer him to Inpatient Psychiatry. He is all set to be transferred [id number] to [facility] tomorrow.    Thanks,     Leeroy Coulter MD  [phone] Pager

## 2023-06-07 NOTE — H&P
[facility]    History and Physical - Hospitalist Service, GOLD TEAM        Date of Admission:  6/6/2023    Assessment & Plan      Jaime Mccormick is a 28 year old male with a past medical history significant for chemical dependency (alcohol, tobacco, heroin, narcotics, benzodiazepine), seizure disorder, major depressive disorder, and JED presenting to the ED seeking detox from benzodiazepines and fentanyl.    #benzodiazepine dependence, withdrawal - reports taking 4-5 grams per day, last use 6-7 days prior to admission. Endorses some hallucinations.    - per ED provider, addiction pharmacist recommending phenobarb 64.8 TID  - tele, monitor for ongoing withdrawal (MSSA due to benzo use)  - chemical dependency and psychiatry consult    #fentanyl dependence, withdrawal - reports 1.5 g/day use up until 4-5 days ago. Tried initiating suboxone, but didn't feel like it was helping/felt like it was making withdrawal worse.  - monitor, chem dep and psychiatry consult as above    #hypokalemia, hypermagnesemia, QT prolongation  - replaced in ED, avoid QT prolonging meds and repeat K q4h    #ISABELLA on ckd 2/2 likely dehydration  - cr 1.47, s/p 2 L crystalloid in ED, trend  - UA if not improving in AM    #HAGMA - suspect 2/2 ketoacid production due to poor Po intake the last few days  - follow in AM    #leukocytosis, unclear etiology, no infectious symptoms/findings, ? Possible stress  -trend, consider inflammatory markers pending course     Diet:   regular  DVT Prophylaxis: Pneumatic Compression Devices  Epstein Catheter: Not present  Lines: None     Cardiac Monitoring: None  Code Status:   Full    Clinically Significant Risk Factors Present on Admission        # Hypokalemia: Lowest K = 2.4 mmol/L in last 2 days, will replace as needed    # Hypercalcemia: Highest Ca = 10.2 mg/dL in last 2 days, will monitor as appropriate   # Anion Gap Metabolic Acidosis: Highest Anion Gap = 20  "mmol/L in last 2 days, will monitor and treat as appropriate      # Hypertension: Home medication list includes antihypertensive(s)      # Overweight: Estimated body mass index is 28.25 kg/m  as calculated from the following:    Height as of this encounter: 1.676 m (5' 6\").    Weight as of this encounter: 79.4 kg (175 lb).            Disposition Plan      Expected Discharge Date: 06/08/2023                  Lalo Petty MD  Hospitalist Service, [facility]  Securely message with [employer] (more info)  Text page via [facility] Paging/Directory   See signed in provider for up to date coverage information    ______________________________________________________________________    Chief Complaint   Withdrawal    History is obtained from the patient    History of Present Illness   Jaime Mccormick is a 28 year old male who presents with several days of withdrawal.  Patient reports that he began experiencing symptoms of withdrawal over the last 2 to 3 days.  He was previously using Xanax and stopped somewhere in the 7 to 10 days ago range.  Since that time her symptoms have gradually worsened.  The exact timing of his last use is unclear and at times appears to be more recent.  He reports using somewhere and on the 4 to 5 mg range.  Additionally he reports physical use in the 1 to 1.5 g/day range he says that he last used 4 to 5 days ago.  He tried using Suboxone but thought that it made his symptoms worse.  He wants to complete his withdrawal and detox.  He reports tremors, anxiety, a feeling of restlessness, but no fevers or chills.  No chest pain or shortness of breath.  No headaches or blurred vision.  He does endorse hallucinations starting today.  He has a history of withdrawal in the past, including withdrawal seizures.      Past Medical History    Past Medical History:   Diagnosis Date     Anxiety      Chronic kidney disease      Depressive disorder  "     Polysubstance abuse (H) 2/1/2020       Past Surgical History   Past Surgical History:   Procedure Laterality Date     NO HISTORY OF SURGERY         Prior to Admission Medications   Prior to Admission Medications   Prescriptions Last Dose Informant Patient Reported? Taking?   QUEtiapine (SEROQUEL) 100 MG tablet Past Week  No Yes   Sig: Take 1 tablet (100 mg) by mouth At Bedtime   amLODIPine (NORVASC) 10 MG tablet More than a month Self No Yes   Sig: Take 1 tablet (10 mg) by mouth every evening   cloNIDine (CATAPRES) 0.1 MG tablet Past Week  No Yes   Sig: Take 1 tablet (0.1 mg) by mouth 3 times daily   escitalopram (LEXAPRO) 20 MG tablet More than a month Self No Yes   Sig: Take 1 tablet (20 mg) by mouth daily   gabapentin (NEURONTIN) 300 MG capsule Past Week Self Yes Yes   Sig: Take 300 mg by mouth 3 times daily   methadone (DOLPHINE) 5 MG/5ML solution More than a month Self Yes Yes   Sig: Take 70 mLs (70 mg) by mouth daily      Facility-Administered Medications: None        Review of Systems    The 10 point Review of Systems is negative other than noted in the HPI or here.      Physical Exam   Vital Signs: Temp: 98.7  F (37.1  C) Temp src: Oral BP: (!) 137/99 Pulse: 97   Resp: 14 SpO2: 98 % O2 Device: None (Room air)    Weight: 175 lbs 0 oz    Constitutional: awake, alert, cooperative, no apparent distress, and appears stated age  Eyes: lids and lashes normal, pupilary abnormality to include dilation and sclera clear  Respiratory: No increased work of breathing, good air exchange, clear to auscultation bilaterally, no crackles or wheezing  Cardiovascular: Normal apical impulse, regular rate and rhythm, normal S1 and S2, no S3 or S4, and no murmur noted  GI: No scars, normal bowel sounds, soft, non-distended, non-tender, no masses palpated, no hepatosplenomegally  Skin: no bruising or bleeding    Medical Decision Making       65 MINUTES SPENT BY ME on the date of service doing chart review, history, exam,  documentation & further activities per the note.      Data     I have personally reviewed the following data over the past 24 hrs:    17.3 (H)  \   17.4   / 408     131 (L) 76 (L) 40.9 (H) /  140 (H)   2.4 (LL) 35 (H) 1.47 (H) \       ALT: 17 AST: 16 AP: 104 TBILI: 1.1   ALB: 5.4 (H) TOT PROTEIN: 8.4 (H) LIPASE: 41

## 2023-06-07 NOTE — PROGRESS NOTES
K+ recheck 2.7 and replaced per high potassium protocol.  Pt A+O, no symptoms of withdrawal per MSSA.

## 2023-06-07 NOTE — PROGRESS NOTES
"Shift: 0471-0647     VS:  /84 (BP Location: Left arm)   Pulse 92   Temp 98.5  F (36.9  C) (Oral)   Resp 18   Ht 1.676 m (5' 6\")   Wt 79.4 kg (175 lb)   SpO2 94%   BMI 28.25 kg/m      Pain: denies pain  Neuro: WDL  Respiratory: WDL  Diet/Appetite: Good appetite throughout shift  LDA's: Right PIV with continuous lactated ringers infusion  Skin: WDL  Activity: Independent in room, Went outside to smoke four times during shift, no nicotine patch in place. Patient complained of nausea and diarrhea, 1 loose stool reported, prn Zofran given  Procedures: n/a  Pertinent Labs/: On RN manage Magnesium and Potassium High replacement protocol, potassium 3.3 and replaced during shift  Plan: Chemical Dependency and Psych consult, discharge to be determined   "

## 2023-06-07 NOTE — UTILIZATION REVIEW
Admission Status; Secondary Review Determination         Under the authority of the Utilization Management Committee, the utilization review process indicated a secondary review on the above patient.  The review outcome is based on review of the medical records, discussions with staff, and applying clinical experience noted on the date of the review.        (x)      Inpatient Status Appropriate - This patient's medical care is consistent with medical management for inpatient care and reasonable inpatient medical practice.     RATIONALE FOR DETERMINATION   The patient is an 87-year-old female admitted on 6/5/2023.  The patient came to the ED due to increased confusion.  Creatinine was noted to be elevated at 1.08 on admission and is 1.02 today.  Patient has chronic anemia and 8.6 is the hemoglobin today.  Patient is diabetic type II with hemoglobins in the 200s.  Patient has chronic oxygen needs and is currently at 4 L/min to maintain O2 sats in the upper 90s she was diagnosed with some increased pulmonary edema although no chest x-ray is noted in the chart echocardiogram was ordered but there are no results.  The patient is being discharged today with a final diagnosis of lower extremity edema most likely due to chronic dependent edema from peripheral vascular disease.  Patient is rate controlled for her atrial fib with medication.  Based on current diagnosis and treatment, agree with observation status.      The severity of illness, intensity of service provided, expected LOS and risk for adverse outcome make the care complex, high risk and appropriate for hospital admission.        The information on this document is developed by the utilization review team in order for the business office to ensure compliance.  This only denotes the appropriateness of proper admission status and does not reflect the quality of care rendered.         The definitions of Inpatient Status and Observation Status used in making the  determination above are those provided in the CMS Coverage Manual, Chapter 1 and Chapter 6, section 70.4.      Sincerely,     Jack Rios MD  Physician Advisor  Utilization Review/ Case Management  [facility].

## 2023-06-07 NOTE — ED PROVIDER NOTES
[facility] EMERGENCY DEPARTMENT ([facility])    6/06/23      ED PROVIDER NOTE      History     Chief Complaint   Patient presents with     Addiction Problem     Patient presents seeking detox for benzos and fentanyl. Patient reports not being able to sleep for 6 days. Patient last used benzos 7 days ago and last used fentanyl 3 days ago. Patient was attempting to quit cold turkey at home, however withdrawals are getting worse. Patient reports attempting to use Suboxone at home with no relief.     HPI  Jaime Mccormick is a 28 year old male with a past medical history significant for chemical dependency (alcohol, tobacco, heroin, narcotics, benzodiazepine), seizure disorder, major depressive disorder, and JED presenting to the ED seeking detox from benzodiazepines and fentanyl.  Patient states that he has last used benzos 7 days ago and fentanyl 3 days ago.  He reports attempting to quit independently at home, however states that his withdrawal symptoms are becoming more severe.  Patient had attempted to use Suboxone at home, however with no relief.  Notes that he has not been able to sleep for 6 days patient feels overwhelmed that he is not able to manage his current withdrawal for both benzo and opiate dependence.    Past Medical History  Past Medical History:   Diagnosis Date     Anxiety      Chronic kidney disease      Depressive disorder      Polysubstance abuse (H) 2/1/2020     Past Surgical History:   Procedure Laterality Date     NO HISTORY OF SURGERY       amLODIPine (NORVASC) 10 MG tablet  cloNIDine (CATAPRES) 0.1 MG tablet  escitalopram (LEXAPRO) 20 MG tablet  gabapentin (NEURONTIN) 300 MG capsule  methadone (DOLPHINE) 5 MG/5ML solution  QUEtiapine (SEROQUEL) 100 MG tablet      Allergies   Allergen Reactions     Amoxicillin Hives     Metoclopramide Hives     Family History  Family History   Problem Relation Age of Onset     Alcoholism Mother      Breast Cancer Paternal Grandmother      Social  "History   Social History     Tobacco Use     Smoking status: Every Day     Packs/day: 0.50     Types: Cigarettes     Smokeless tobacco: Never   Substance Use Topics     Alcohol use: Yes     Comment: social     Drug use: Yes     Frequency: 7.0 times per week     Types: Opiates, Benzodiazepines         A medically appropriate review of systems was performed with pertinent positives and negatives noted in the HPI, and all other systems negative.    Physical Exam   BP: 124/84  Pulse: 113  Temp: 98.4  F (36.9  C)  Resp: 18  Height: 167.6 cm (5' 6\")  Weight: 79.4 kg (175 lb)  SpO2: 97 %  Physical Exam  Constitutional:       General: He is not in acute distress.     Appearance: Normal appearance. He is not toxic-appearing.   HENT:      Head: Atraumatic.   Eyes:      General: No scleral icterus.     Conjunctiva/sclera: Conjunctivae normal.   Cardiovascular:      Rate and Rhythm: Normal rate.      Heart sounds: Normal heart sounds.   Pulmonary:      Effort: Pulmonary effort is normal. No respiratory distress.      Breath sounds: Normal breath sounds.   Abdominal:      Palpations: Abdomen is soft.      Tenderness: There is no abdominal tenderness.   Musculoskeletal:         General: No deformity.      Cervical back: Neck supple.   Skin:     General: Skin is warm.   Neurological:      General: No focal deficit present.      Mental Status: He is alert and oriented to person, place, and time.      Sensory: No sensory deficit.      Motor: No weakness.      Coordination: Coordination normal.   Psychiatric:         Mood and Affect: Mood is anxious.         Behavior: Behavior is cooperative.         Thought Content: Thought content does not include homicidal or suicidal ideation.           ED Course, Procedures, & Data      Procedures         Medications   0.9% sodium chloride BOLUS (0 mLs Intravenous Stopped 6/7/23 0029)     Followed by   sodium chloride 0.9% infusion ( Intravenous $New Bag 6/7/23 0029)   PHENobarbital (LUMINAL) " tablet 64.8 mg (64.8 mg Oral $Given 6/7/23 0028)   ondansetron (ZOFRAN) injection 4 mg (4 mg Intravenous $Given 6/6/23 2214)   potassium chloride (KLOR-CON) Packet 40 mEq (40 mEq Oral $Given 6/6/23 2331)   potassium chloride 10 mEq in 100 mL sterile water infusion (0 mEq Intravenous Stopped 6/7/23 0028)     Labs Ordered and Resulted from Time of ED Arrival to Time of ED Departure   COMPREHENSIVE METABOLIC PANEL - Abnormal       Result Value    Sodium 131 (*)     Potassium 2.4 (*)     Chloride 76 (*)     Carbon Dioxide (CO2) 35 (*)     Anion Gap 20 (*)     Urea Nitrogen 40.9 (*)     Creatinine 1.47 (*)     Calcium 10.2 (*)     Glucose 140 (*)     Alkaline Phosphatase 104      AST 16      ALT 17      Protein Total 8.4 (*)     Albumin 5.4 (*)     Bilirubin Total 1.1      GFR Estimate 66     CBC WITH PLATELETS AND DIFFERENTIAL - Abnormal    WBC Count 17.3 (*)     RBC Count 5.81      Hemoglobin 17.4      Hematocrit 49.0      MCV 84      MCH 29.9      MCHC 35.5      RDW 12.3      Platelet Count 408     DIFFERENTIAL - Abnormal    % Neutrophils 63      % Lymphocytes 25      % Monocytes 11      % Eosinophils 1      % Basophils 0      Absolute Neutrophils 10.9 (*)     Absolute Lymphocytes 4.3      Absolute Monocytes 1.9 (*)     Absolute Eosinophils 0.2      Absolute Basophils 0.0      RBC Morphology Confirmed RBC Indices      Platelet Assessment        Value: Automated Count Confirmed. Platelet morphology is normal.   MAGNESIUM - Abnormal    Magnesium 3.2 (*)    COVID-19 VIRUS (CORONAVIRUS) BY PCR - Normal    SARS CoV2 PCR Negative     LIPASE - Normal    Lipase 41     DRUG ABUSE SCREEN 1 URINE (ED) - Normal    Amphetamines Urine Screen Negative      Barbituates Urine Screen Negative      Benzodiazepine Urine Screen Negative      Cannabinoids Urine Screen Negative      Cocaine Urine Screen Negative      Opiates Urine Screen Negative       No orders to display          Critical care was not performed.     Medical Decision  Making  The patient's presentation was of high complexity (a chronic illness severe exacerbation, progression, or side effect of treatment).    The patient's evaluation involved:  ordering and/or review of 3+ test(s) in this encounter (see separate area of note for details)  discussion of management or test interpretation with another health professional (Patient was discussed face-to-face with independent provider from [facility] evaluation we discussed inpatient versus outpatient management patient will require inpatient management for safety.)    The patient's management necessitated high risk (a decision regarding hospitalization).      Assessment & Plan        I have reviewed the nursing notes. I have reviewed the findings, diagnosis, plan and need for follow up with the patient.    Patient with benzo dependence fentanyl dependence hypokalemia active suicidal ideation with intent to harm at this time patient be admitted to inpatient psychiatric services with chemical dependency awareness.    Final diagnoses:   Benzodiazepine dependence (H)   Fentanyl dependence (H)   Hypokalemia   Suicidal ideation         [facility] EMERGENCY DEPARTMENT  6/6/2023     Branden Wilkes MD  06/07/23 0105

## 2023-06-07 NOTE — CONSULTS
6/7/2023    CD consult completed.   Pt reports he doesn't need anything from writer, plans to go to [facility] for longer detox support (see CRISTIANO Wyhte note for further details, basically explained the same thing to this writer).  Explained to pt if anything changes or falls through while he's in the hospital, we can connect again. Pt verbalized understanding.     [email or web address]  Direct phone: [phone]

## 2023-06-07 NOTE — ED NOTES
Verbal report given to receiving RN on 6 med surg. No outstanding questions at this time. Will transport patient to floor.

## 2023-06-07 NOTE — PROGRESS NOTES
Care Management Initial Consult    General Information  Assessment completed with: Patient, Patient  Type of CM/SW Visit: Initial Assessment    Primary Care Provider verified and updated as needed:     Readmission within the last 30 days:           Advance Care Planning: Advance Care Planning Reviewed: no concerns identified          Communication Assessment  Patient's communication style: spoken language (English or Bilingual)    Hearing Difficulty or Deaf: no   Wear Glasses or Blind: no    Cognitive  Cognitive/Neuro/Behavioral: WDL                      Living Environment:   People in home: significant other     Current living Arrangements:        Able to return to prior arrangements: yes       Family/Social Support:  Care provided by:    Provides care for:    Marital Status: Single  Significant Other          Description of Support System: Supportive         Current Resources:   Patient receiving home care services: No     Community Resources: None  Equipment currently used at home: none  Supplies currently used at home: None    Employment/Financial:  Employment Status: unemployed (on [employer])        Financial Concerns: No concerns identified           Does the patient's insurance plan have a 3 day qualifying hospital stay waiver?  No    Lifestyle & Psychosocial Needs:  Social Determinants of Health     Tobacco Use: High Risk (9/29/2020)    Patient History      Smoking Tobacco Use: Every Day      Smokeless Tobacco Use: Never      Passive Exposure: Not on file   Alcohol Use: Not on file   Financial Resource Strain: Not on file   Food Insecurity: Not on file   Transportation Needs: Not on file   Physical Activity: Not on file   Stress: Not on file   Social Connections: Not on file   Intimate Partner Violence: Not on file   Depression: Not at risk (11/14/2019)    PHQ-2      PHQ-2 Score: 1   Recent Concern: Depression - At risk (10/11/2019)    PHQ-2      PHQ-2 Score: 3   Housing Stability: Not on file        "    Values/Beliefs:  Spiritual, Cultural Beliefs, Quaker Practices, Values that affect care: no               Additional Information:      Per note from nurse- Sandra Taylor on 6/6- \"Patient presents seeking detox for benzos and fentanyl. Patient reports not being able to sleep for 6 days. Patient last used benzos 7 days ago and last used fentanyl 3 days ago. Patient was attempting to quit cold turkey at home, however withdrawals are getting worse. Patient reports attempting to use Suboxone at home with no relief.\"    Writer met with patient at bedside to discuss discharge planning. Patient stated he has a psychiatrist and doesn't want a therapist at this time. He stated he lives in an apartment with his partner. He stated he drives and has a car. He stated his family and friends are supportive. He stated he does not receive home care services and is not interested in that at this time. He stated he is on a leave of absence right now and is not working. He was working in fast food previously. He stated his mental health diagnoses are insomnia, anxiety and nerve pain. He takes medications for mental health he stated.    He stated he struggles with heroin and benzos also. He went to treatment once in the past at [facility]. His goal is to get better, be sober and to get into a detox facility. He denied any spiritual beliefs.     He stated he doesn't need a ADIS assessment as he got one a few days ago. He stated he was accepted to [facility] and they have an opening tomorrow- 6/8. His doctor stated he may not be medically cleared until 2-3 days from 6/7. Writer got confirmation from the patient to speak with [facility].     Writer spoke with [facility] also and they have an opening on 6/8. They can also accept him on Friday-6/10. [facility] requested writer send the discharge paperwork to them via fax when he is ready to discharge.     Writer paged the doctor to confirm his discharge date.     Ras JOSEPH# " [phone]  Fax# [phone]    EDWARD Frazier, [facility]  6 Med Surg   [facility]  Phone: [phone]  Pager: [phone]

## 2023-06-07 NOTE — PROGRESS NOTES
[facility]    Medicine Progress Note - Hospitalist Service, GOLD TEAM 18    Date of Admission:  6/6/2023    Assessment & Plan     28 year old male with a past medical history significant for chemical dependency (alcohol, tobacco, heroin, narcotics, benzodiazepine), seizure disorder, major depressive disorder, and JED presenting to the ED seeking detox from benzodiazepines and fentanyl.     # Benzodiazepine dependence, withdrawal - reports taking 4-5 grams per day, last use 6-7 days prior to admission. Endorses some hallucinations.    Patient does not remember when was the last time he took benzodiazepine.  >Patient was started on phenobarbital 64.8 3 times daily  >Patient was placed on tele  >On 6/7/2023, patient reported feeling tired.    Plan:  - Continue phenobarbital at 64.8 mg 3 times daily.  - Awaiting psychiatry consult, can Consult.     # fentanyl dependence, withdrawal - reports 1.5 g/day use up until 4-5 days ago. Tried initiating suboxone, but didn't feel like it was helping/felt like it was making withdrawal worse.  >On 6/7/2023, patient does not seem to be going through opiate withdrawal.  His only symptom is feeling tired.  - monitor, chem dep and psychiatry consult as above     # hypokalemia, hypermagnesemia, QT prolongation  - replaced in ED, avoid QT prolonging meds and repeat K q4h  -Replace per protocol.     # ISABELLA on ckd 2/2 likely dehydration  - cr 1.47, s/p 2 L crystalloid in ED, trend  >Patient reported that he was having persistent vomiting for the last few days.  Patient denies any diarrhea.  Patient denies taking any NSAIDs.  >Creatinine level 1.27 on 6/7/2023.    Plan:  - Avoid nephrotoxic agents.  - Continue IV fluids.  - Avoid hypotension, hypovolemia.  - BMP at evening    # HAGMA: suspect 2/2 ketoacid production due to poor Po intake the last few days  Anion gap has resolved.     # leukocytosis:   unclear etiology, no infectious  "symptoms/findings, ? Possible stress  White count improved to 14.6 on 6/7/2023.  It was 17.3 on 6/6/2023  No overt signs of GI  or respiratory infection  Plan:  - Continue to trend white count.  - Monitor for signs and symptoms of infection        Diet:   regular  DVT Prophylaxis: Pneumatic Compression Devices  Epstein Catheter: Not present  Lines: None     Cardiac Monitoring: None  Code Status:   Full          Diet: Combination Diet Regular Diet Adult    DVT Prophylaxis: Ambulate every shift  Epstein Catheter: Not present  Lines: None     Cardiac Monitoring: ACTIVE order. Indication: Electrolyte Imbalance (24 hours)- Magnesium <1.3 mg/ml; Potassium < =2.8 or > 5.5 mg/ml  Code Status: Full Code      Clinically Significant Risk Factors Present on Admission        # Hypokalemia: Lowest K = 2.4 mmol/L in last 2 days, will replace as needed    # Hypercalcemia: Highest Ca = 10.2 mg/dL in last 2 days, will monitor as appropriate   # Anion Gap Metabolic Acidosis: Highest Anion Gap = 20 mmol/L in last 2 days, will monitor and treat as appropriate      # Hypertension: Home medication list includes antihypertensive(s)      # Overweight: Estimated body mass index is 28.25 kg/m  as calculated from the following:    Height as of this encounter: 1.676 m (5' 6\").    Weight as of this encounter: 79.4 kg (175 lb).            Disposition Plan      Expected Discharge Date: 06/08/2023                  Deven Moran MD  Hospitalist Service, GOLD TEAM 18  [facility]  Securely message with [employer] (more info)  Text page via [facility] Paging/Directory   See signed in provider for up to date coverage information  ______________________________________________________________________    Interval History     Overnight events reviewed.  Patient reports feeling tired.  Otherwise denies any nausea, vomiting, chest pain, shortness of breath, lightheadedness, dizziness.      Physical Exam   Vital Signs: " Temp: 98.8  F (37.1  C) Temp src: Oral BP: 134/84 Pulse: 83   Resp: 20 SpO2: 93 % O2 Device: None (Room air)    Weight: 175 lbs 0 oz    General Appearance: Laying in bed in no acute distress  Respiratory: Bilaterally clear to auscultation  Cardiovascular:, S2 normal  GI: Soft, nontender, bowel sounds positive  Skin: No obvious rashes  Neuro: Alert, awake, oriented    Medical Decision Making             Data

## 2023-06-08 VITALS
BODY MASS INDEX: 28.12 KG/M2 | HEIGHT: 66 IN | WEIGHT: 175 LBS | HEART RATE: 67 BPM | DIASTOLIC BLOOD PRESSURE: 88 MMHG | OXYGEN SATURATION: 98 % | SYSTOLIC BLOOD PRESSURE: 145 MMHG | TEMPERATURE: 98.4 F | RESPIRATION RATE: 18 BRPM

## 2023-06-08 LAB
ANION GAP SERPL CALCULATED.3IONS-SCNC: 11 MMOL/L (ref 7–15)
ATRIAL RATE - MUSE: 98 BPM
BASOPHILS # BLD AUTO: 0 10E3/UL (ref 0–0.2)
BASOPHILS NFR BLD AUTO: 0 %
BUN SERPL-MCNC: 19.7 MG/DL (ref 6–20)
CALCIUM SERPL-MCNC: 8.7 MG/DL (ref 8.6–10)
CHLORIDE SERPL-SCNC: 97 MMOL/L (ref 98–107)
CREAT SERPL-MCNC: 1.02 MG/DL (ref 0.67–1.17)
DEPRECATED HCO3 PLAS-SCNC: 29 MMOL/L (ref 22–29)
DIASTOLIC BLOOD PRESSURE - MUSE: NORMAL MMHG
EOSINOPHIL # BLD AUTO: 0.3 10E3/UL (ref 0–0.7)
EOSINOPHIL NFR BLD AUTO: 2 %
ERYTHROCYTE [DISTWIDTH] IN BLOOD BY AUTOMATED COUNT: 12.2 % (ref 10–15)
GFR SERPL CREATININE-BSD FRML MDRD: >90 ML/MIN/1.73M2
GLUCOSE SERPL-MCNC: 98 MG/DL (ref 70–99)
HCT VFR BLD AUTO: 37.4 % (ref 40–53)
HGB BLD-MCNC: 13.1 G/DL (ref 13.3–17.7)
HOLD SPECIMEN: NORMAL
IMM GRANULOCYTES # BLD: 0.1 10E3/UL
IMM GRANULOCYTES NFR BLD: 1 %
INTERPRETATION ECG - MUSE: NORMAL
LYMPHOCYTES # BLD AUTO: 4.4 10E3/UL (ref 0.8–5.3)
LYMPHOCYTES NFR BLD AUTO: 43 %
MAGNESIUM SERPL-MCNC: 2.3 MG/DL (ref 1.7–2.3)
MCH RBC QN AUTO: 30.3 PG (ref 26.5–33)
MCHC RBC AUTO-ENTMCNC: 35 G/DL (ref 31.5–36.5)
MCV RBC AUTO: 86 FL (ref 78–100)
MONOCYTES # BLD AUTO: 0.9 10E3/UL (ref 0–1.3)
MONOCYTES NFR BLD AUTO: 9 %
NEUTROPHILS # BLD AUTO: 4.6 10E3/UL (ref 1.6–8.3)
NEUTROPHILS NFR BLD AUTO: 45 %
NRBC # BLD AUTO: 0 10E3/UL
NRBC BLD AUTO-RTO: 0 /100
P AXIS - MUSE: 58 DEGREES
PLATELET # BLD AUTO: 251 10E3/UL (ref 150–450)
POTASSIUM SERPL-SCNC: 3.4 MMOL/L (ref 3.4–5.3)
POTASSIUM SERPL-SCNC: 3.6 MMOL/L (ref 3.4–5.3)
POTASSIUM SERPL-SCNC: 3.6 MMOL/L (ref 3.4–5.3)
PR INTERVAL - MUSE: 142 MS
QRS DURATION - MUSE: 90 MS
QT - MUSE: 444 MS
QTC - MUSE: 566 MS
R AXIS - MUSE: 10 DEGREES
RBC # BLD AUTO: 4.33 10E6/UL (ref 4.4–5.9)
SODIUM SERPL-SCNC: 137 MMOL/L (ref 136–145)
SYSTOLIC BLOOD PRESSURE - MUSE: NORMAL MMHG
T AXIS - MUSE: 62 DEGREES
VENTRICULAR RATE- MUSE: 98 BPM
WBC # BLD AUTO: 10.3 10E3/UL (ref 4–11)

## 2023-06-08 PROCEDURE — 84132 ASSAY OF SERUM POTASSIUM: CPT | Performed by: STUDENT IN AN ORGANIZED HEALTH CARE EDUCATION/TRAINING PROGRAM

## 2023-06-08 PROCEDURE — 36415 COLL VENOUS BLD VENIPUNCTURE: CPT | Performed by: INTERNAL MEDICINE

## 2023-06-08 PROCEDURE — 84132 ASSAY OF SERUM POTASSIUM: CPT | Performed by: INTERNAL MEDICINE

## 2023-06-08 PROCEDURE — 85025 COMPLETE CBC W/AUTO DIFF WBC: CPT | Performed by: INTERNAL MEDICINE

## 2023-06-08 PROCEDURE — 250N000013 HC RX MED GY IP 250 OP 250 PS 637: Performed by: FAMILY MEDICINE

## 2023-06-08 PROCEDURE — 82310 ASSAY OF CALCIUM: CPT | Performed by: INTERNAL MEDICINE

## 2023-06-08 PROCEDURE — 83735 ASSAY OF MAGNESIUM: CPT | Performed by: INTERNAL MEDICINE

## 2023-06-08 PROCEDURE — 250N000013 HC RX MED GY IP 250 OP 250 PS 637: Performed by: STUDENT IN AN ORGANIZED HEALTH CARE EDUCATION/TRAINING PROGRAM

## 2023-06-08 PROCEDURE — 250N000013 HC RX MED GY IP 250 OP 250 PS 637: Performed by: PSYCHIATRY & NEUROLOGY

## 2023-06-08 PROCEDURE — 99233 SBSQ HOSP IP/OBS HIGH 50: CPT

## 2023-06-08 PROCEDURE — 258N000003 HC RX IP 258 OP 636: Performed by: STUDENT IN AN ORGANIZED HEALTH CARE EDUCATION/TRAINING PROGRAM

## 2023-06-08 PROCEDURE — 82374 ASSAY BLOOD CARBON DIOXIDE: CPT | Performed by: INTERNAL MEDICINE

## 2023-06-08 PROCEDURE — 99239 HOSP IP/OBS DSCHRG MGMT >30: CPT | Performed by: INTERNAL MEDICINE

## 2023-06-08 RX ORDER — CLONIDINE HYDROCHLORIDE 0.1 MG/1
0.1 TABLET ORAL 3 TIMES DAILY
Qty: 90 TABLET | Refills: 0 | Status: SHIPPED | OUTPATIENT
Start: 2023-06-08 | End: 2024-08-17

## 2023-06-08 RX ORDER — ESCITALOPRAM OXALATE 20 MG/1
20 TABLET ORAL DAILY
Qty: 30 TABLET | Refills: 0 | Status: SHIPPED | OUTPATIENT
Start: 2023-06-08

## 2023-06-08 RX ORDER — PHENOBARBITAL 64.8 MG/1
64.8 TABLET ORAL 3 TIMES DAILY
Start: 2023-06-08

## 2023-06-08 RX ORDER — POTASSIUM CHLORIDE 1500 MG/1
40 TABLET, EXTENDED RELEASE ORAL ONCE
Status: COMPLETED | OUTPATIENT
Start: 2023-06-08 | End: 2023-06-08

## 2023-06-08 RX ORDER — QUETIAPINE FUMARATE 100 MG/1
100 TABLET, FILM COATED ORAL AT BEDTIME
Qty: 30 TABLET | Refills: 0 | Status: SHIPPED | OUTPATIENT
Start: 2023-06-08

## 2023-06-08 RX ORDER — GABAPENTIN 300 MG/1
300 CAPSULE ORAL 3 TIMES DAILY
Qty: 90 CAPSULE | Refills: 0 | Status: SHIPPED | OUTPATIENT
Start: 2023-06-08

## 2023-06-08 RX ORDER — AMLODIPINE BESYLATE 10 MG/1
10 TABLET ORAL EVERY EVENING
Qty: 30 TABLET | Refills: 0 | Status: SHIPPED | OUTPATIENT
Start: 2023-06-08

## 2023-06-08 RX ADMIN — GABAPENTIN 300 MG: 300 CAPSULE ORAL at 04:29

## 2023-06-08 RX ADMIN — PHENOBARBITAL 64.8 MG: 64.8 TABLET ORAL at 14:04

## 2023-06-08 RX ADMIN — PHENOBARBITAL 64.8 MG: 64.8 TABLET ORAL at 08:33

## 2023-06-08 RX ADMIN — CLONIDINE HYDROCHLORIDE 0.1 MG: 0.1 TABLET ORAL at 04:29

## 2023-06-08 RX ADMIN — GABAPENTIN 300 MG: 300 CAPSULE ORAL at 14:04

## 2023-06-08 RX ADMIN — POTASSIUM CHLORIDE 40 MEQ: 1500 TABLET, EXTENDED RELEASE ORAL at 06:42

## 2023-06-08 RX ADMIN — SODIUM CHLORIDE, POTASSIUM CHLORIDE, SODIUM LACTATE AND CALCIUM CHLORIDE: 600; 310; 30; 20 INJECTION, SOLUTION INTRAVENOUS at 01:50

## 2023-06-08 RX ADMIN — CLONIDINE HYDROCHLORIDE 0.1 MG: 0.1 TABLET ORAL at 14:04

## 2023-06-08 RX ADMIN — ESCITALOPRAM OXALATE 20 MG: 10 TABLET ORAL at 08:33

## 2023-06-08 ASSESSMENT — ACTIVITIES OF DAILY LIVING (ADL)
ADLS_ACUITY_SCORE: 18

## 2023-06-08 NOTE — CONSULTS
Psychiatry Consultation; Follow up              Reason for Consult, requesting source:    F/u   Requesting source: Hospitalist     Labs and imaging reviewed, sen by MARCO Edwards CNP. Discussed with social work    Total time spent in chart review, patient interview and coordination of care; 60 minutes                Interim history:    The patient is a 28-year-old, single, white male with a long history of depression and anxiety and polysubstance abuse including alcohol, heroin, opioids and benzodiazepines, who initially presented to our emergency department seeking detox from benzodiazepines and fentanyl.  He was started on phenobarbital taper 64.8mg TID 6/7. Reports today he is doing well, denies withdrawal sxs denies cravings. Is interested in starting suboxone but [facility]. Talked on the phone with patient to Jennifer from [place] [facility] confirming that they do phenobarb taper and suboxone initiation. Pt alert and oriented x4 and calm/cooperative. No evidence of nii/psychosis.         Current Medications:       cloNIDine  0.1 mg Oral TID     escitalopram  20 mg Oral Daily     gabapentin  300 mg Oral TID     nicotine  1 patch Transdermal Daily     nicotine   Transdermal Q8H     PHENobarbital  64.8 mg Oral TID     QUEtiapine  100 mg Oral At Bedtime     sodium chloride (PF)  3 mL Intracatheter Q8H              MSE:   Appearance: awake, alert, adequately groomed and dressed in hospital scrubs  Attitude:  cooperative  Eye Contact:  good  Mood:  better  Affect:  : mood congruent  Speech:  clear, coherent  Psychomotor Behavior:  no evidence of tardive dyskinesia, dystonia, or tics  Muscle strength and tone: baseline   Thought Process:  logical, linear and goal oriented  Associations:  no loose associations  Thought Content:  no evidence of suicidal ideation or homicidal ideation and no evidence of psychotic thought  Insight:  partial  Judgement:  limited  Oriented to:  " time, person, and place  Attention Span and Concentration:  intact  Recent and Remote Memory:  intact    Vital signs:  Temp: 98.4  F (36.9  C) Temp src: Oral BP: (!) 141/95 Pulse: 67   Resp: 18 SpO2: 98 % O2 Device: None (Room air)   Height: 167.6 cm (5' 6\") Weight: 79.4 kg (175 lb)  Estimated body mass index is 28.25 kg/m  as calculated from the following:    Height as of this encounter: 1.676 m (5' 6\").    Weight as of this encounter: 79.4 kg (175 lb).    Qtc: 473 on 6/7         DSM-5 Diagnosis:   1.  Depression, not otherwise specified (NOS).  2.  Anxiety disorder, not otherwise specified (NOS).  3.  Benzodiazepine use disorder.  4.  Opioid use disorder.  5.  Opiate and benzodiazepine withdrawal syndrome.          Assessment/Plan:   Fidencio is a 28 year old male with a history of the above diagnoses who presents seeking detox. He was started on Phenobarb 64.8mg TID. He is interested in getting back on suboxone, but elects to start Suboxone at [facility]. Clarified with detox center that they do suboxone and phenobarb taper.     1. Discharge to [facility] on current medications  2. Further taper/management per detox center       Yoselyn Estrada, PMDARÍOP-BC  Consult/Liaison Psychiatry   [facility]                 "

## 2023-06-08 NOTE — DISCHARGE SUMMARY
"[facility]  Hospitalist Discharge Summary      Date of Admission:  6/6/2023  Date of Discharge:  6/8/2023  4:00 PM  Discharging Provider: Deven Moran MD  Discharge Service: Hospitalist Service, GOLD TEAM 18    Discharge Diagnoses      Benzodiazepine dependence, withdrawal  Fentanyl dependence, withdrawal  Hypokalemia, hypermagnesemia, QT prolongation  ISABELLA on ckd  versus ISABELLA  HAGMA  Leukocytosis       Clinically Significant Risk Factors     # Overweight: Estimated body mass index is 28.25 kg/m  as calculated from the following:    Height as of this encounter: 1.676 m (5' 6\").    Weight as of this encounter: 79.4 kg (175 lb).       Follow-ups Needed After Discharge   Follow-up Appointments     Adult [facility] Follow-up and recommended labs and tests      Follow up with primary care after detox    Appointments on [place] and/or [facility] (with [facility] or [facility]   provider or service). Call [phone] if you haven't heard regarding   these appointments within 7 days of discharge.             Unresulted Labs Ordered in the Past 30 Days of this Admission     No orders found from 5/7/2023 to 6/7/2023.      These results will be followed up primary care    Discharge Disposition   Transferred to chemical dependency and treatment center.  F2  Condition at discharge: Stable    Hospital Course   28 year old male with a past medical history significant for chemical dependency (alcohol, tobacco, heroin, narcotics, benzodiazepine), seizure disorder, major depressive disorder, and JED presenting to the ED seeking detox from benzodiazepines and fentanyl.     # Benzodiazepine dependence, withdrawal - reports taking 4-5 grams per day, last use 6-7 days prior to admission. Endorses some hallucinations.    Patient does not remember when was the last time he took benzodiazepine.  >Patient was started on phenobarbital 64.8 3 times daily  >Patient was placed on tele  >On 6/7/2023, " patient reported feeling tired.     Plan:  - Continue phenobarbital at 64.8 mg 3 times daily.  -Evaluated by psychiatry on 6/7/2023.  Appreciate recommendations.   - Evaluated by psychiatry on 6/8/2023.  Patient's detox center would be prescribing phenobarbitone.    # fentanyl dependence, withdrawal - reports 1.5 g/day use up until 4-5 days ago. Tried initiating suboxone, but didn't feel like it was helping/felt like it was making withdrawal worse.  >On 6/7/2023, patient does not seem to be going through opiate withdrawal.  His only symptom is feeling tired.  >Patient now seem to be interested in starting Subutex.  >Patient evaluated by psychiatry on 6/8/2023.  Patient's detox center will be starting on Suboxone  Plan:  - [facility]     # hypokalemia, hypermagnesemia, QT prolongation  - replaced in ED, avoid QT prolonging meds  >Resolved.    # ISABELLA on ckd  versus ISABELLA  >Likely related to prerenal dehydration.  - cr 1.47, s/p 2 L crystalloid in ED, trend  >Patient reported that he was having persistent vomiting for the last few days.  Patient denies any diarrhea.  Patient denies taking any NSAIDs.  >Creatinine level 1.27 on 6/7/2023.  >creatinine level has normalized at 1.02 on 6/8/2023 with IV fluids.     Plan:  - Encourage good p.o. intake.  - Avoid nephrotoxic agent    # HAGMA: suspect 2/2 ketoacid production due to poor Po intake the last few days  Anion gap has resolved.     # leukocytosis:   unclear etiology, no infectious symptoms/findings, ? Possible stress  White count improved to 14.6 on 6/7/2023.  It was 17.3 on 6/6/2023  No overt signs of GI  or respiratory infection  >White count has normalized on 6/8/2023.  Plan:  - Monitor for signs and symptoms of infection        Diet:   regular  DVT Prophylaxis: Pneumatic Compression Devices  Epstein Catheter: Not present  Lines: None     Cardiac Monitoring: None  Code Status:   Full          Consultations This Hospital Stay   CHEMICAL DEPENDENCY IP  CONSULT  PSYCHIATRY IP CONSULT  PSYCHIATRY IP CONSULT    Code Status   Full Code    Time Spent on this Encounter   I, Deven Moran MD, personally saw the patient today and spent greater than 30 minutes discharging this patient.       Deven Moran MD  [facility] MED SURG  [facility] [address]  Phone: [phone]  Fax: [phone]  ______________________________________________________________________  Reports doing well this morning.  Denies any nausea, vomiting, chest pain or shortness of breath.  Reports she has treatment center to go to date this afternoon.  Reports feeling comfortable with the plan.    Physical Exam   Vital Signs: Temp: 98.4  F (36.9  C) Temp src: Oral BP: (!) 145/88 Pulse: 67   Resp: 18 SpO2: 98 % O2 Device: None (Room air)    Weight: 175 lbs 0 oz  General Appearance: Alert awake and oriented  Respiratory: Bilaterally clear to auscultation  Cardiovascular: S1, S2 normal  GI: Soft, nontender bowel sounds positive  Skin: No obvious rashes  Other: Awake and oriented       Primary Care Physician   Physician No Ref-Primary    Discharge Orders      Reason for your hospital stay    Admitted for leonila, hypokalemia, detoxification from opiates and benzo     Activity    Your activity upon discharge: activity as tolerated     Monitor and record    Monitor/watch for nausea, vomiting, chest pain, shortnss of breasth  IF these symptoms occurs, please call your primary care or come to eD     Adult [facility] Follow-up and recommended labs and tests    Follow up with primary care after detox    Appointments on [place] and/or [facility] (with [facility] or [facility] provider or service). Call [phone] if you haven't heard regarding these appointments within 7 days of discharge.     Diet    Follow this diet upon discharge: Orders Placed This Encounter      Combination Diet Regular Diet Adult       Significant Results and Procedures   Most Recent 3 CBC's:Recent Labs   Lab  Test 06/08/23  0755 06/07/23 0316 06/06/23 2058   WBC 10.3 14.6* 17.3*   HGB 13.1* 15.2 17.4   MCV 86 84 84    317 408     Most Recent 3 BMP's:Recent Labs   Lab Test 06/08/23  0755 06/08/23  0516 06/07/23  1720 06/07/23  1047 06/07/23 0316     --  133*  --  132*   POTASSIUM 3.6  3.6 3.4 3.7  3.7   < > 2.7*   CHLORIDE 97*  --  92*  --  86*   CO2 29  --  30*  --  32*   BUN 19.7  --  23.8*  --  32.4*   CR 1.02  --  1.30*  --  1.27*   ANIONGAP 11  --  11  --  14   MIROSLAVA 8.7  --  8.9  --  9.0   GLC 98  --  117*  --  147*    < > = values in this interval not displayed.     Most Recent 2 LFT's:Recent Labs   Lab Test 06/06/23 2058 07/24/20  0653   AST 16 17   ALT 17 39   ALKPHOS 104 155*   BILITOTAL 1.1 0.3       Discharge Medications   Current Discharge Medication List      START taking these medications    Details   PHENobarbital (LUMINAL) 64.8 MG tablet Take 1 tablet (64.8 mg) by mouth 3 times daily    Associated Diagnoses: Benzodiazepine withdrawal without complication (H)         CONTINUE these medications which have CHANGED    Details   amLODIPine (NORVASC) 10 MG tablet Take 1 tablet (10 mg) by mouth every evening  Qty: 30 tablet, Refills: 0    Associated Diagnoses: Benzodiazepine withdrawal without complication (H)      cloNIDine (CATAPRES) 0.1 MG tablet Take 1 tablet (0.1 mg) by mouth 3 times daily  Qty: 90 tablet, Refills: 0    Associated Diagnoses: Chemical dependency (H)      escitalopram (LEXAPRO) 20 MG tablet Take 1 tablet (20 mg) by mouth daily  Qty: 30 tablet, Refills: 0    Associated Diagnoses: PTSD (post-traumatic stress disorder)      gabapentin (NEURONTIN) 300 MG capsule Take 1 capsule (300 mg) by mouth 3 times daily  Qty: 90 capsule, Refills: 0    Associated Diagnoses: Anxiety disorder, unspecified type      QUEtiapine (SEROQUEL) 100 MG tablet Take 1 tablet (100 mg) by mouth At Bedtime  Qty: 30 tablet, Refills: 0    Associated Diagnoses: PTSD (post-traumatic stress disorder)         STOP  taking these medications       methadone (DOLPHINE) 5 MG/5ML solution Comments:   Reason for Stopping:             Allergies   Allergies   Allergen Reactions     Amoxicillin Hives     Metoclopramide Hives

## 2023-06-08 NOTE — PROGRESS NOTES
6MS DISCHARGE    D: Patient discharged to [facility] at 1500. Patient accompanied by friend.    I: Discharge prescriptions given to patient. All discharge medications and instructions reviewed with patient. Patient instructed to seek care if experiencing worsening symptoms, such as nausea, vomiting, or diarrhea. Other phone numbers to call with questions or concerns after discharge reviewed. PIV removed. Education completed.    A: Patient verbalized understanding of discharge medications and instructions. Prescribed home medications given to patient.  Belongings returned to patient.    P: Patient to follow-up on with primary care provider as needed.    Philomena Maya RN on 6/8/2023 at 3:28 PM

## 2023-06-08 NOTE — PROGRESS NOTES
"Care Management Discharge Note    Discharge Date: 06/08/2023       Discharge Disposition: [facility]    [facility]  [address] [place] [address]  24/7 Admissions: [phone]  Jennifer:  JAKE# [phone]  Fax# [phone]    Discharge Services: [facility] services     Discharge DME: None    Discharge Transportation:  Family or friend    Private pay costs discussed: Not applicable    Does the patient's insurance plan have a 3 day qualifying hospital stay waiver?  No    PAS Confirmation Code:  N/A  Patient/family educated on Medicare website which has current facility and service quality ratings:  N/A    Education Provided on the Discharge Plan: Yes  Persons Notified of Discharge Plans: provider, charge nurse, bedside nurse, patient, facility  Patient/Family in Agreement with the Plan: yes    Handoff Referral Completed: Yes    Additional Information:    CRISTIANO informed by psych provider that patient can discharge to [facility] as [facility] will manage his phenobarbital. [facility] staff will come to pick patient up.    CRISTIANO messaged provider to complete discharge orders.    CRISTIANO met with patient at bedside and confirmed that patient is discharging to [facility] in [place]. He has someone who will pick him up and bring him to [facility].     12:37 PM  CRISTIANO faxed orders to [facility], per their request.    2:14 PM  Notified by bedside RN that the facility is stating that they cannot admit patient until they receive \"all of documents from his admission.\"  CRISTIANO called admissions at [phone] and left VM requesting call back regarding what documents they need in order for them to take patient. They stated that they need paperwork showing why patient was admitted to the hospital as well as blood work result. CRISTIANO printed and faxed to facility.           Valarie Maradiaga, BRIDGETTW, LSW  8 Med Surg   [facility]  Phone: [phone]  Pager: [phone]      "

## 2023-06-08 NOTE — PROGRESS NOTES
"VS: BP (!) 141/95 (BP Location: Left arm)   Pulse 67   Temp 98.4  F (36.9  C) (Oral)   Resp 18   Ht 1.676 m (5' 6\")   Wt 79.4 kg (175 lb)   SpO2 98%   BMI 28.25 kg/m       O2: Stable on room air, denies chest pain/SOB   Output: Voids spontaneously w/o difficulty   Last BM: 6/8/2023, continent of bowel and bladder   Activity: Independent in room and halls   Up for meals?    Skin: All visible skin intact   Pain: Patient denies pain   CMS: AO x4, Denies N/T   Dressing: None   Diet: Regular diet   LDA: PIV removed   Equipment: IV pole/pump, call light w/in reach, personal belongings remain with patient   Plan: Discharge to [place] for Detox/rehab   Additional Info:      Philomena Maya RN on 6/8/2023 at 2:25 PM    "

## 2023-06-08 NOTE — PROGRESS NOTES
[facility]    Medicine Progress Note - Hospitalist Service, GOLD TEAM 18    Date of Admission:  6/6/2023    Assessment & Plan     28 year old male with a past medical history significant for chemical dependency (alcohol, tobacco, heroin, narcotics, benzodiazepine), seizure disorder, major depressive disorder, and JED presenting to the ED seeking detox from benzodiazepines and fentanyl.     # Benzodiazepine dependence, withdrawal - reports taking 4-5 grams per day, last use 6-7 days prior to admission. Endorses some hallucinations.    Patient does not remember when was the last time he took benzodiazepine.  >Patient was started on phenobarbital 64.8 3 times daily  >Patient was placed on tele  >On 6/7/2023, patient reported feeling tired.    Plan:  - Continue phenobarbital at 64.8 mg 3 times daily.  -Evaluated by psychiatry on 6/7/2023.  Appreciate recommendations.  I have paged Dr Coulter. Awaiting response.  We will consult psychiatry for finalizing plan.     # fentanyl dependence, withdrawal - reports 1.5 g/day use up until 4-5 days ago. Tried initiating suboxone, but didn't feel like it was helping/felt like it was making withdrawal worse.  >On 6/7/2023, patient does not seem to be going through opiate withdrawal.  His only symptom is feeling tired.  >Patient now seem to be interested in starting Subutex.  Plan:  - Will discuss with psychiatry about the dosing and follow-up plan with the patient often     # hypokalemia, hypermagnesemia, QT prolongation  - replaced in ED, avoid QT prolonging meds and repeat K q4h  >Resolved.  -Replace per protocol.     # ISABELLA on ckd  versus ISABELLA  >Likely related to prerenal dehydration.  - cr 1.47, s/p 2 L crystalloid in ED, trend  >Patient reported that he was having persistent vomiting for the last few days.  Patient denies any diarrhea.  Patient denies taking any NSAIDs.  >Creatinine level 1.27 on 6/7/2023.  >A creatinine level has  "normalized at 1.02 on 6/8/2023 with IV fluids.    Plan:  - Avoid nephrotoxic agents.  - Discontinue IV fluids.  - Avoid hypotension, hypovolemia.    # HAGMA: suspect 2/2 ketoacid production due to poor Po intake the last few days  Anion gap has resolved.     # leukocytosis:   unclear etiology, no infectious symptoms/findings, ? Possible stress  White count improved to 14.6 on 6/7/2023.  It was 17.3 on 6/6/2023  No overt signs of GI  or respiratory infection  >White count has normalized on 6/8/2023.  Plan:  - Monitor for signs and symptoms of infection        Diet:   regular  DVT Prophylaxis: Pneumatic Compression Devices  Epstein Catheter: Not present  Lines: None     Cardiac Monitoring: None  Code Status:   Full          Diet: Combination Diet Regular Diet Adult    DVT Prophylaxis: Ambulate every shift  Epstein Catheter: Not present  Lines: None     Cardiac Monitoring: None  Code Status: Full Code      Clinically Significant Risk Factors        # Hypokalemia: Lowest K = 2.4 mmol/L in last 2 days, will replace as needed    # Hypercalcemia: Highest Ca = 10.2 mg/dL in last 2 days, will monitor as appropriate   # Anion Gap Metabolic Acidosis: Highest Anion Gap = 20 mmol/L in last 2 days, will monitor and treat as appropriate             # Overweight: Estimated body mass index is 28.25 kg/m  as calculated from the following:    Height as of this encounter: 1.676 m (5' 6\").    Weight as of this encounter: 79.4 kg (175 lb)., PRESENT ON ADMISSION          Disposition Plan      Expected Discharge Date: 06/08/2023                  Deven Moran MD  Hospitalist Service, GOLD TEAM 18  [facility]  Securely message with [employer] (more info)  Text page via [facility] Paging/Directory   See signed in provider for up to date coverage information  ______________________________________________________________________    Interval History     Overnight events reviewed.  Patient at this morning " reports feeling tired.  Reports she had 3 episodes of loose stools yesterday.  Reports she has not had any bowel movement this morning.      Physical Exam   Vital Signs: Temp: 98.4  F (36.9  C) Temp src: Oral BP: (!) 141/95 Pulse: 67   Resp: 18 SpO2: 98 % O2 Device: None (Room air)    Weight: 175 lbs 0 oz    General Appearance: Laying in bed in no acute distress  Respiratory: Bilaterally clear to auscultation  Cardiovascular:, S2 normal  GI: Soft, nontender, bowel sounds positive  Skin: No obvious rashes  Neuro: Alert, awake, oriented    Medical Decision Making             Data

## 2023-06-08 NOTE — CONSULTS
Consult Date: 06/07/2023    REQUESTED BY:  Dr. Lalo Petty    The patient is a 28-year-old, single, white male with a long history of depression and anxiety and polysubstance abuse including alcohol, heroin, opioids and benzodiazepines, who initially presented to our emergency department seeking detox from benzodiazepines and fentanyl.  He was started on phenobarbital taper, and psychiatric consultation was ordered to assess his current status and advise on further management.    HISTORY OF PRESENT ILLNESS  I have reviewed the patient's chart and interviewed the patient in his room.  She was rather hesitant to answer any questions initially but, as the interview progressed, became more talkative and cooperative.  He told me that he has a long history of depression and anxiety and has been under the care of Dr. Mason at [facility] [place], who prescribed his medications, which included Lexapro 20 mg daily, Seroquel 100 mg at bedtime, gabapentin 300 mg t.i.d. and clonidine 0.1 mg t.i.d.  He has been taking his medications as prescribed.    He had numerous emergency room visits mainly for anxiety and depression but had no true psychiatric admissions to the hospital.    CHEMICAL USE:  The patient has been abusing drugs since his late teens. Opiates and benzodiazepines have been his drugs of choice.  He told me that he has been buying Xanax tablets off the streets and usually used up to 6 mg of Xanax a day on a daily basis.  His last use of Xanax was about 7 days prior to admission.  He also has been snorting fentanyl on a daily basis, and his last use was about 3 days prior to admission.  Apparently, he tried to detox himself at home and has been using Suboxone, but that gave him no relief.  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    FAMILY HISTORY:  Remarkable for chemical dependency in 1 of his sisters who used to  use methamphetamines but, most recently, has been using opioids and is currently in treatment. Another sister has been suffering from anxiety, and his mother was described as alcoholic.    PAST MEDICAL HISTORY:  Remarkable for chronic kidney disease.  He had no pertinent surgical history.    ALLERGIES:  THE PATIENT IS ALLERGIC TO AMOXICILLIN AND METOCLOPRAMIDE.    BRIEF SOCIAL HISTORY:  The patient was born and raised in [place], 1 of 3 children to his parents, who got  when he was a young boy.  He lived with his father and had very little to do with his mother.  He reported being bullied in school.  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  He states that he wanted to take a leave of absence to attend chemical dependency treatment.  The patient has never been  and has no children.    MENTAL STATUS EXAMINATION:  Revealed a normally-built and normally-developed, generally pleasant, friendly and cooperative with the interview, 28-year-old, white male, appearing about his stated age.  He was alert and oriented x3.  His speech was coherent and goal-directed.  He appeared to be mildly depressed and somewhat anxious.  He adamantly denied suicidal ideation or intent currently or ever in the past.  He denied hallucinations, and no prominent delusions could be noted or elicited.  The patient was functioning at the estimated average level of intelligence.  He had some insight into his problems and is motivated towards sobriety and another treatment.    DIAGNOSTIC IMPRESSION:  1.  Depression, not otherwise specified (NOS).  2.  Anxiety disorder, not otherwise specified (NOS).  3.  Benzodiazepine use disorder.  4.  Opioid use disorder.  5.  Opiate and benzodiazepine withdrawal syndrome.    PLAN:  I will continue phenobarbital in the same doses and  consider restarting Suboxone or methadone.  I will resume his Lexapro in its PTA dose of 20 mg q.a.m. and resume Seroquel 100 mg at bedtime.  I will continue gabapentin and clonidine in the same doses.    I see no reason for him to be transferred to inpatient psychiatry.  Apparently, he is all set to be transferred door to door to [facility] tomorrow, hoping that they will refer him to long-term chemical dependency treatment.    I thank you very much for letting me participate in the care of this patient.    Leeroy Coulter MD        D: 2023   T: 2023   MT: [email or web address]    Name:     WASHINGTON CHAVEZ  MRN:      [id number]        Account:      [id number]   :      1995           Consult Date: 2023     Document: [id number]    cc:  Lalo Petty MD

## 2023-06-08 NOTE — PROGRESS NOTES
VS: Vital signs:  Temp: 98.5  F (36.9  C) Temp src: Oral BP: (!) 163/96 Pulse: 76   Resp: 18 SpO2: 97 % O2 Device: None (Room air)      O2: Denies sob and chest pain   Output: Voids spontaneously    Last BM: 6/7/23 two loose tools around 1800   Activity: ind in room    Skin: Visible skin is intact    Pain: Denies and no sob   CMS: A & O X4, denies N/T    Dressing: None    Diet: Regular    LDA: R hand posterior PIV    Equipment: IV pole and pump, personal belongings    Plan: Continue with POC    Additional Info: Pt went out to smoke X1,

## 2024-01-04 LAB
ATRIAL RATE - MUSE: 86 BPM
DIASTOLIC BLOOD PRESSURE - MUSE: NORMAL MMHG
INTERPRETATION ECG - MUSE: NORMAL
P AXIS - MUSE: 41 DEGREES
PR INTERVAL - MUSE: 132 MS
QRS DURATION - MUSE: 90 MS
QT - MUSE: 396 MS
QTC - MUSE: 473 MS
R AXIS - MUSE: 9 DEGREES
SYSTOLIC BLOOD PRESSURE - MUSE: NORMAL MMHG
T AXIS - MUSE: 35 DEGREES
VENTRICULAR RATE- MUSE: 86 BPM

## 2024-01-20 ENCOUNTER — HOSPITAL ENCOUNTER (EMERGENCY)
Facility: CLINIC | Age: 29
Discharge: HOME | End: 2024-01-20
Attending: EMERGENCY MEDICINE | Admitting: EMERGENCY MEDICINE
Payer: COMMERCIAL

## 2024-01-20 VITALS
OXYGEN SATURATION: 100 % | HEART RATE: 68 BPM | WEIGHT: 167.5 LBS | RESPIRATION RATE: 14 BRPM | TEMPERATURE: 98.9 F | DIASTOLIC BLOOD PRESSURE: 83 MMHG | SYSTOLIC BLOOD PRESSURE: 144 MMHG | HEIGHT: 66 IN | BODY MASS INDEX: 26.92 KG/M2

## 2024-01-20 DIAGNOSIS — Z76.0 ENCOUNTER FOR MEDICATION REFILL: ICD-10-CM

## 2024-01-20 DIAGNOSIS — F11.93 OPIATE WITHDRAWAL (H): ICD-10-CM

## 2024-01-20 PROCEDURE — 250N000013 HC RX MED GY IP 250 OP 250 PS 637: Performed by: EMERGENCY MEDICINE

## 2024-01-20 PROCEDURE — 99283 EMERGENCY DEPT VISIT LOW MDM: CPT | Performed by: EMERGENCY MEDICINE

## 2024-01-20 PROCEDURE — G2213 INITIAT MED ASSIST TX IN ER: HCPCS | Performed by: EMERGENCY MEDICINE

## 2024-01-20 PROCEDURE — 99284 EMERGENCY DEPT VISIT MOD MDM: CPT | Performed by: EMERGENCY MEDICINE

## 2024-01-20 RX ORDER — BUPRENORPHINE AND NALOXONE 8; 2 MG/1; MG/1
1 FILM, SOLUBLE BUCCAL; SUBLINGUAL DAILY
Qty: 9 FILM | Refills: 0 | Status: SHIPPED | OUTPATIENT
Start: 2024-01-20 | End: 2024-03-24 | Stop reason: DRUGHIGH

## 2024-01-20 RX ORDER — BUPRENORPHINE AND NALOXONE 4; 1 MG/1; MG/1
1 FILM, SOLUBLE BUCCAL; SUBLINGUAL DAILY
Status: DISCONTINUED | OUTPATIENT
Start: 2024-01-20 | End: 2024-01-20 | Stop reason: HOSPADM

## 2024-01-20 RX ORDER — BUPRENORPHINE AND NALOXONE 12; 3 MG/1; MG/1
1 FILM, SOLUBLE BUCCAL; SUBLINGUAL 2 TIMES DAILY
COMMUNITY
End: 2024-03-24 | Stop reason: DRUGHIGH

## 2024-01-20 RX ORDER — BUPRENORPHINE AND NALOXONE 8; 2 MG/1; MG/1
1 FILM, SOLUBLE BUCCAL; SUBLINGUAL DAILY
Status: DISCONTINUED | OUTPATIENT
Start: 2024-01-20 | End: 2024-01-20 | Stop reason: HOSPADM

## 2024-01-20 RX ADMIN — BUPRENORPHINE AND NALOXONE 1 FILM: 4; 1 FILM, SOLUBLE BUCCAL; SUBLINGUAL at 19:09

## 2024-01-20 RX ADMIN — BUPRENORPHINE AND NALOXONE 1 FILM: 8; 2 FILM, SOLUBLE BUCCAL; SUBLINGUAL at 19:09

## 2024-01-20 ASSESSMENT — ACTIVITIES OF DAILY LIVING (ADL): ADLS_ACUITY_SCORE: 33

## 2024-01-21 NOTE — ED TRIAGE NOTES
Triage Assessment (Adult)       Row Name 01/20/24 1836          Triage Assessment    Airway WDL WDL        Respiratory WDL    Respiratory WDL WDL        Skin Circulation/Temperature WDL    Skin Circulation/Temperature WDL WDL        Cardiac WDL    Cardiac WDL WDL        Peripheral/Neurovascular WDL    Peripheral Neurovascular WDL WDL        Cognitive/Neuro/Behavioral WDL    Cognitive/Neuro/Behavioral WDL WDL

## 2024-01-21 NOTE — ED PROVIDER NOTES
"ED Provider Note  [facility]      History     Chief Complaint   Patient presents with    Medication Refill     Needs a bridge script for suboxone     HPI  Jaime Mccormick is a 28 year old male who presents with opioid use disorder and need for suboxone prescription. Patient is concerned about early withdrawal symptoms in absence of access to suboxone. He uses suboxone as therapy due to history of opioid use disorder. Denies fevers or chills, denies chest pain or shortness of breath.     Past Medical History  Past Medical History:   Diagnosis Date    Anxiety     Chronic kidney disease     Depressive disorder     Polysubstance abuse (H) 2/1/2020     Past Surgical History:   Procedure Laterality Date    NO HISTORY OF SURGERY       amLODIPine (NORVASC) 10 MG tablet  Buprenorphine HCl-Naloxone HCl (SUBOXONE) 12-3 MG FILM per film  buprenorphine HCl-naloxone HCl (SUBOXONE) 8-2 MG per film  cloNIDine (CATAPRES) 0.1 MG tablet  escitalopram (LEXAPRO) 20 MG tablet  gabapentin (NEURONTIN) 300 MG capsule  PHENobarbital (LUMINAL) 64.8 MG tablet  QUEtiapine (SEROQUEL) 100 MG tablet      Allergies   Allergen Reactions    Amoxicillin Hives    Metoclopramide Hives     Family History  Family History   Problem Relation Age of Onset    Alcoholism Mother     Breast Cancer Paternal Grandmother      Social History   Social History     Tobacco Use    Smoking status: Every Day     Packs/day: .5     Types: Cigarettes    Smokeless tobacco: Never   Substance Use Topics    Alcohol use: Yes     Comment: social    Drug use: Yes     Frequency: 7.0 times per week     Types: Opiates, Benzodiazepines         A medically appropriate review of systems was performed with pertinent positives and negatives noted in the HPI, and all other systems negative.    Physical Exam   BP: (!) 144/83  Pulse: 68  Temp: 98.9  F (37.2  C)  Resp: 14  Height: 167.6 cm (5' 6\")  Weight: 76 kg (167 lb 8 oz)  SpO2: 100 %  Physical Exam  Vitals " and nursing note reviewed.   Constitutional:       General: He is not in acute distress.     Appearance: Normal appearance. He is diaphoretic. He is not toxic-appearing.   HENT:      Head: Normocephalic and atraumatic.   Eyes:      Extraocular Movements: Extraocular movements intact.      Conjunctiva/sclera: Conjunctivae normal.   Cardiovascular:      Rate and Rhythm: Normal rate and regular rhythm.   Pulmonary:      Effort: Pulmonary effort is normal.      Breath sounds: Normal breath sounds.   Abdominal:      Palpations: Abdomen is soft.      Tenderness: There is no abdominal tenderness.   Musculoskeletal:      Cervical back: Normal range of motion and neck supple.   Skin:     General: Skin is warm.   Neurological:      General: No focal deficit present.      Mental Status: He is alert and oriented to person, place, and time.   Psychiatric:         Mood and Affect: Mood normal.         Behavior: Behavior normal.           ED Course, Procedures, & Data      Procedures       -----  Initiation of Medication for the Treatment of Opioid Use Disorder (OUD) in the Emergency Department (ED)  [facility]      Assessment:   Opioid(s) used: fentanyl   Amount: 1.5g/day  Frequency: daily  Route: smoked  Duration: weeks  Last use: week or so prior    Other substance(s) with ongoing use: benzodiazepines    The patient meets the following DSM-V criteria for Opioid Use Disorder (OUD):   Taking more than was intended  Persistent desire or unsuccessful efforts to cut down  Craving  Failure to fulfill obligations at work, school, or home  Recurrent use in physically hazardous situations  Tolerance  Withdrawal    (Severity: Mild: 2-3 criteria, Moderate: 4-5 criteria. Severe: 6 or more criteria)  Based on my assessment, the patient has Moderate OUD.     Medication Initiated in the ED:  In the ED, treatment for OUD was initiated. The patient was given 3 dose(s) of 4 mg buprenorphine while in the ED.     Upon ED discharge, outpatient  prescription treatment for OUD was initiated.   The patient was prescribed Suboxone and naloxone.      Referral to Ongoing Care and Supportive Services:   Upon ED discharge, the patient was referred to Addiction Medicine for ongoing care and supportive services. The patient was also provided with information on community resources for various supportive services for OUD, and advised to return to the ED if having worsening symptoms.   -----               No results found for any visits on 01/20/24.  Medications - No data to display  Labs Ordered and Resulted from Time of ED Arrival to Time of ED Departure - No data to display  No orders to display          Critical care was not performed.     Medical Decision Making  The patient's presentation was of moderate complexity (a chronic illness mild to moderate exacerbation, progression, or side effect of treatment).    The patient's evaluation involved:  review of external note(s) from 1 sources (see separate area of note for details)    The patient's management necessitated moderate risk (prescription drug management including medications given in the ED).    Assessment & Plan    Jaime Mccormick is a 28 year old male who presents with opioid use disorder and need for suboxone prescription. Patient is concerned about early withdrawal symptoms in absence of access to suboxone.  Reviewed dosing regimen and gave suboxone in ED; prescribed additional suboxone for the next few days until he can get more from clinic. Return precautions discussed.     I have reviewed the nursing notes. I have reviewed the findings, diagnosis, plan and need for follow up with the patient.    Discharge Medication List as of 1/20/2024  7:20 PM        START taking these medications    Details   buprenorphine HCl-naloxone HCl (SUBOXONE) 8-2 MG per film Place 1 Film under the tongue daily, Disp-9 Film, R-0, [facility]             Final diagnoses:   Encounter for medication refill   Opiate  withdrawal (H)       John Hauser MD  [facility] EMERGENCY DEPARTMENT  1/20/2024     John Hauser MD  01/21/24 5585

## 2024-03-24 ENCOUNTER — HOSPITAL ENCOUNTER (EMERGENCY)
Facility: CLINIC | Age: 29
Discharge: HOME | End: 2024-03-24
Attending: EMERGENCY MEDICINE | Admitting: EMERGENCY MEDICINE
Payer: COMMERCIAL

## 2024-03-24 VITALS
HEART RATE: 74 BPM | TEMPERATURE: 98.4 F | OXYGEN SATURATION: 97 % | BODY MASS INDEX: 25.78 KG/M2 | SYSTOLIC BLOOD PRESSURE: 152 MMHG | HEIGHT: 66 IN | WEIGHT: 160.4 LBS | DIASTOLIC BLOOD PRESSURE: 83 MMHG | RESPIRATION RATE: 18 BRPM

## 2024-03-24 DIAGNOSIS — F19.90 SUBSTANCE USE DISORDER: ICD-10-CM

## 2024-03-24 PROCEDURE — 250N000013 HC RX MED GY IP 250 OP 250 PS 637: Performed by: EMERGENCY MEDICINE

## 2024-03-24 PROCEDURE — 99284 EMERGENCY DEPT VISIT MOD MDM: CPT | Performed by: EMERGENCY MEDICINE

## 2024-03-24 PROCEDURE — 99283 EMERGENCY DEPT VISIT LOW MDM: CPT | Performed by: EMERGENCY MEDICINE

## 2024-03-24 RX ORDER — BUPRENORPHINE AND NALOXONE 4; 1 MG/1; MG/1
3 FILM, SOLUBLE BUCCAL; SUBLINGUAL
Status: COMPLETED | OUTPATIENT
Start: 2024-03-24 | End: 2024-03-24

## 2024-03-24 RX ORDER — BUPRENORPHINE AND NALOXONE 12; 3 MG/1; MG/1
1 FILM, SOLUBLE BUCCAL; SUBLINGUAL 2 TIMES DAILY
Qty: 5 FILM | Refills: 0 | Status: SHIPPED | OUTPATIENT
Start: 2024-03-24 | End: 2024-03-24

## 2024-03-24 RX ORDER — BUPRENORPHINE AND NALOXONE 4; 1 MG/1; MG/1
3 FILM, SOLUBLE BUCCAL; SUBLINGUAL DAILY
Status: DISCONTINUED | OUTPATIENT
Start: 2024-03-25 | End: 2024-03-25 | Stop reason: HOSPADM

## 2024-03-24 RX ADMIN — BUPRENORPHINE AND NALOXONE 3 FILM: 4; 1 FILM, SOLUBLE BUCCAL; SUBLINGUAL at 22:41

## 2024-03-24 ASSESSMENT — COLUMBIA-SUICIDE SEVERITY RATING SCALE - C-SSRS
1. IN THE PAST MONTH, HAVE YOU WISHED YOU WERE DEAD OR WISHED YOU COULD GO TO SLEEP AND NOT WAKE UP?: NO
2. HAVE YOU ACTUALLY HAD ANY THOUGHTS OF KILLING YOURSELF IN THE PAST MONTH?: NO
6. HAVE YOU EVER DONE ANYTHING, STARTED TO DO ANYTHING, OR PREPARED TO DO ANYTHING TO END YOUR LIFE?: NO

## 2024-03-24 ASSESSMENT — ACTIVITIES OF DAILY LIVING (ADL): ADLS_ACUITY_SCORE: 33

## 2024-03-25 NOTE — DISCHARGE INSTRUCTIONS
Take your Suboxone as prescribed.  Follow-up with your prescriber on Wednesday return to the ER as needed

## 2024-03-25 NOTE — ED PROVIDER NOTES
ED Provider Note  [facility]      History     Chief Complaint   Patient presents with    Medication Refill     Has appt with psychiatrist on Wednesday and has to see his Dr. In person to get suboxone refilled but took his last strip today. Would like to get enough suboxone refill to last him until Wednesday.      HPI  Jaime Mccormick is a 29 year old male PMH of alcohol withdrawal, chemical dependency, seizure, who presents to the ER for evaluation of medication refill.    Patient here looking for a suboxone prescription. He takes 24mg daily, 12mg in morning and 12mg at night. He took his last strip this morning and is here for a refill to get him to his appointment with his psychiatrist on Wednesday. His provider was not able to send a refill on Friday. Patient reports a runny nose but no other symptoms.  No other acute medical concerns today.         Past Medical History  Past Medical History:   Diagnosis Date    Anxiety     Chronic kidney disease     Depressive disorder     Polysubstance abuse (H) 2/1/2020     Past Surgical History:   Procedure Laterality Date    NO HISTORY OF SURGERY       Buprenorphine HCl-Naloxone HCl (SUBOXONE) 12-3 MG FILM per film  amLODIPine (NORVASC) 10 MG tablet  cloNIDine (CATAPRES) 0.1 MG tablet  escitalopram (LEXAPRO) 20 MG tablet  gabapentin (NEURONTIN) 300 MG capsule  PHENobarbital (LUMINAL) 64.8 MG tablet  QUEtiapine (SEROQUEL) 100 MG tablet      Allergies   Allergen Reactions    Amoxicillin Hives    Metoclopramide Hives     Family History  Family History   Problem Relation Age of Onset    Alcoholism Mother     Breast Cancer Paternal Grandmother      Social History   Social History     Tobacco Use    Smoking status: Every Day     Packs/day: .25     Types: Cigarettes, Vaping Device    Smokeless tobacco: Never    Tobacco comments:     Smokes only 1-2 cigs per day now; reports he vapes a lot   Substance Use Topics    Alcohol use: Not Currently      "Comment: sober since 2018    Drug use: Not Currently     Types: Opiates, Benzodiazepines     Comment: 9 months sober and opiates; benzo sober for a long time         A medically appropriate review of systems was performed with pertinent positives and negatives noted in the HPI, and all other systems negative.    Physical Exam   BP: (!) 142/82  Pulse: 103  Temp: 98.4  F (36.9  C)  Resp: 18  Height: 167.6 cm (5' 6\")  Weight: 72.8 kg (160 lb 6.4 oz)  SpO2: 99 %  Physical Exam  General: awake, alert, NAD  Head: normal cephalic  HEENT: pupils equal, conjugate gaze intact  Neck: Supple  CV: regular rate   Lungs: Breathing comfortably on room air  Abd: soft, non-tender, no guarding, no peritoneal signs  EXT: lower extremities without swelling or edema  Neuro: awake, answers questions appropriately. No focal deficits noted       ED Course, Procedures, & Data      Procedures       No results found for any visits on 03/24/24.  Medications   buprenorphine HCl-naloxone HCl (SUBOXONE) 4-1 MG per film 3 Film (has no administration in time range)     Labs Ordered and Resulted from Time of ED Arrival to Time of ED Departure - No data to display  No orders to display          Critical care was not performed.     Medical Decision Making  The patient's presentation was of moderate complexity (a chronic illness mild to moderate exacerbation, progression, or side effect of treatment).    The patient's evaluation involved:  review of external note(s) from 2 sources (see separate area of note for details)    The patient's management necessitated moderate risk (prescription drug management including medications given in the ED).    Assessment & Plan    Fidencio is a 29-year-old male with substance use disorder on Suboxone.  He is seeking 5 doses of Suboxone to cover him until Wednesday morning when he can refill his prescription.  Per chart review 12 mg was consistent with the dose he got last time was in the emergency department.  Will give 1 " dose tonight and discharge him home with 5 doses that will cover him through Wednesday morning.  Return precautions discussed.    I have reviewed the nursing notes. I have reviewed the findings, diagnosis, plan and need for follow up with the patient.    New Prescriptions    BUPRENORPHINE HCL-NALOXONE HCL (SUBOXONE) 12-3 MG FILM PER FILM    Place 1 Film under the tongue 2 times daily for 3 days       Final diagnoses:   Substance use disorder     I, Tammi Guevara, am serving as a trained medical scribe to document services personally performed by Corbin Aguilar MD, based on the provider's statements to me.     I, Corbin Aguilar MD, was physically present and have reviewed and verified the accuracy of this note documented by aTmmi Guevara.    Corbin Aguilar MD  [facility] EMERGENCY DEPARTMENT  3/24/2024     Corbin Aguilar MD  03/24/24 8433

## 2024-03-25 NOTE — ED TRIAGE NOTES
Triage Assessment (Adult)       Row Name 03/24/24 2123          Triage Assessment    Airway WDL WDL        Respiratory WDL    Respiratory WDL WDL        Skin Circulation/Temperature WDL    Skin Circulation/Temperature WDL WDL        Cardiac WDL    Cardiac WDL WDL

## 2024-06-01 NOTE — PHARMACY-PHARMACOTHERAPY NOTE
Methadone Clinic Information Note    Clinic Name: [facility]  Clinic Location ([facility]): [place]  Phone Number: [phone]  Prescriber's Name: Dr. Blanchard  On 9/25/20 he was dosed Methadone 70 mg x 1 dose with no take home doses per on call person at clinic.  
Class II - visualization of the soft palate, fauces, and uvula

## 2024-06-18 ENCOUNTER — HOSPITAL ENCOUNTER (EMERGENCY)
Facility: CLINIC | Age: 29
Discharge: HOME | End: 2024-06-18
Attending: EMERGENCY MEDICINE | Admitting: EMERGENCY MEDICINE

## 2024-06-18 VITALS
DIASTOLIC BLOOD PRESSURE: 98 MMHG | HEART RATE: 88 BPM | TEMPERATURE: 98.9 F | RESPIRATION RATE: 16 BRPM | OXYGEN SATURATION: 98 % | SYSTOLIC BLOOD PRESSURE: 155 MMHG

## 2024-06-18 DIAGNOSIS — F11.10 OPIOID ABUSE (H): ICD-10-CM

## 2024-06-18 PROCEDURE — 99284 EMERGENCY DEPT VISIT MOD MDM: CPT | Performed by: EMERGENCY MEDICINE

## 2024-06-18 PROCEDURE — G2213 INITIAT MED ASSIST TX IN ER: HCPCS | Performed by: EMERGENCY MEDICINE

## 2024-06-18 PROCEDURE — 250N000013 HC RX MED GY IP 250 OP 250 PS 637: Performed by: EMERGENCY MEDICINE

## 2024-06-18 RX ORDER — BUPRENORPHINE AND NALOXONE 8; 2 MG/1; MG/1
1 FILM, SOLUBLE BUCCAL; SUBLINGUAL ONCE
Status: COMPLETED | OUTPATIENT
Start: 2024-06-18 | End: 2024-06-18

## 2024-06-18 RX ORDER — QUETIAPINE FUMARATE 100 MG/1
100 TABLET, FILM COATED ORAL AT BEDTIME
Qty: 14 TABLET | Refills: 0 | Status: SHIPPED | OUTPATIENT
Start: 2024-06-18 | End: 2024-07-02

## 2024-06-18 RX ORDER — BUPRENORPHINE AND NALOXONE 8; 2 MG/1; MG/1
1 FILM, SOLUBLE BUCCAL; SUBLINGUAL 2 TIMES DAILY
Qty: 9 FILM | Refills: 0 | Status: SHIPPED | OUTPATIENT
Start: 2024-06-18

## 2024-06-18 RX ADMIN — BUPRENORPHINE AND NALOXONE 1 FILM: 8; 2 FILM BUCCAL; SUBLINGUAL at 22:24

## 2024-06-18 ASSESSMENT — COLUMBIA-SUICIDE SEVERITY RATING SCALE - C-SSRS
6. HAVE YOU EVER DONE ANYTHING, STARTED TO DO ANYTHING, OR PREPARED TO DO ANYTHING TO END YOUR LIFE?: NO
1. IN THE PAST MONTH, HAVE YOU WISHED YOU WERE DEAD OR WISHED YOU COULD GO TO SLEEP AND NOT WAKE UP?: NO
2. HAVE YOU ACTUALLY HAD ANY THOUGHTS OF KILLING YOURSELF IN THE PAST MONTH?: NO

## 2024-06-18 ASSESSMENT — ACTIVITIES OF DAILY LIVING (ADL)
ADLS_ACUITY_SCORE: 33
ADLS_ACUITY_SCORE: 35

## 2024-06-18 ASSESSMENT — LIFESTYLE VARIABLES: TOTAL_SCORE: 6

## 2024-06-19 NOTE — DISCHARGE INSTRUCTIONS
"Buprenorphine (Suboxone) Home Induction Instructions  (instructions adapted from [facility])      Plan to take a day off and have a place to rest.     Stop using and wait until you feel very sick from the withdrawals (at least 12 hours is best, if using fentanyl it may take a few days). You should have at least 3 of the following:  bad chills or sweating  heavy yawning  joint/bone aches  enlarged pupils  runny nose or watery eyes  feeling restless  goose bumps  anxious or irritable  cramps, nausea, vomiting or diarrhea  twitching/tremors/shakes    Dose one or two 8 mg tablets or strips under your tongue (total dose of 8-16 mg).     Make sure the tablet or film fully dissolves under your tongue (takes about 15 minutes). The medication will not work as well if swallowed into the stomach.     After an hour, repeat the dose (another 8-16 mg) to feel well.     The next day, take 16-32 mg (2-4 tablets or films) at one time.          --  If you have started buprenorphine before:  If it went well, that's great! Just do that again.   If it was difficult, talk with your care team to figure out what happened and find ways to make it better this time. You may need a different dosing plan than what is listed here.     If you have never started buprenorphine before:  Gather your support team and if possible take a day off.   You are going to want space to rest. Don't drive.   Using cocaine, methamphetamine, alcohol, or pills makes starting Suboxone/buprenorphine harder, and mixing in alcohol or benzodiazepines can be dangerous.     --  If you have a light habit (e.g. 5 \"Norco 10's\" per day):  Consider a low dose: start with 4 mg and stop and 8 mg total.   WARNING: Withdrawal will continue if you don't take enough buprenorphine.     If you have a heavy habit (e.g. injecting 2 g heroin per day or smoking 1 g fentanyl per day)  Consider a high dose: start with a first dose of 16 mg  For most people, the effects of " buprenorphine max out at around 24-32 mg.   WARNING: Too much buprenorphine can make you feel sick and sleepy.    --  Follow up with your addiction specialist as scheduled for further dosing recommendations and medication refills.     [place] [id number], [address]  [place] [address]  Phone: [phone]  Fax:  [phone]       Hours:       Monday, Wednesday, Friday     Scheduled visits: 9:00 am - 4:00 pm     Walk-in hours:  9:00 am - 3:00 pm        Tuesday, Thursday     Walk-in only hours: 1:30 pm - 4:00 pm

## 2024-06-19 NOTE — ED PROVIDER NOTES
[facility] [place] ([facility])    6/18/24      ED PROVIDER NOTE    History     Chief Complaint   Patient presents with    Medication Refill     HPI  Jaime Mccormick is a 29 year old male with history of chemical dependency (alcohol, tobacco, heroin, narcotics, benzodiazepine), seizure disorder, MDD and JED who presents to the ED requesting Suboxone and Quetiapine refills.  He states that he ran out of both of these medications yesterday and last took them yesterday.  He states that today he felt a little shaky but otherwise feels well.  No nausea or vomiting.  No recent illness, fevers.  He states that there were some insurance issues which made us that he could not get the medications filled and that he is working on these. He denies other complaints.    Past Medical History  Past Medical History:   Diagnosis Date    Anxiety     Chronic kidney disease     Depressive disorder     Polysubstance abuse (H) 2/1/2020     Past Surgical History:   Procedure Laterality Date    NO HISTORY OF SURGERY       buprenorphine HCl-naloxone HCl (SUBOXONE) 8-2 MG per film  naloxone (NARCAN) 4 MG/0.1ML nasal spray  QUEtiapine (SEROQUEL) 100 MG tablet  amLODIPine (NORVASC) 10 MG tablet  cloNIDine (CATAPRES) 0.1 MG tablet  escitalopram (LEXAPRO) 20 MG tablet  gabapentin (NEURONTIN) 300 MG capsule  PHENobarbital (LUMINAL) 64.8 MG tablet  QUEtiapine (SEROQUEL) 100 MG tablet      Allergies   Allergen Reactions    Amoxicillin Hives    Metoclopramide Hives     Family History  Family History   Problem Relation Age of Onset    Alcoholism Mother     Breast Cancer Paternal Grandmother      Social History   Social History     Tobacco Use    Smoking status: Every Day     Current packs/day: 0.25     Types: Cigarettes, Vaping Device    Smokeless tobacco: Never    Tobacco comments:     Smokes only 1-2 cigs per day now; reports he vapes a lot   Substance Use Topics    Alcohol use: Not Currently     Comment: sober since 2018     Drug use: Not Currently     Types: Opiates, Benzodiazepines     Comment: 9 months sober and opiates; benzo sober for a long time      A medically appropriate review of systems was performed with pertinent positives and negatives noted in the HPI, and all other systems negative.    Physical Exam   BP: 138/80  Pulse: 112  Temp: 98.9  F (37.2  C)  Resp: 16  SpO2: 98 %  Physical Exam  Constitutional:       General: He is not in acute distress.     Appearance: He is not toxic-appearing.   HENT:      Head: Normocephalic and atraumatic.      Mouth/Throat:      Mouth: Mucous membranes are moist.      Pharynx: Oropharynx is clear.   Eyes:      Extraocular Movements: Extraocular movements intact.      Pupils: Pupils are equal, round, and reactive to light.   Cardiovascular:      Rate and Rhythm: Normal rate and regular rhythm.   Pulmonary:      Effort: Pulmonary effort is normal. No respiratory distress.      Breath sounds: No wheezing.   Musculoskeletal:         General: Normal range of motion.      Cervical back: Normal range of motion.   Skin:     General: Skin is warm and dry.   Neurological:      General: No focal deficit present.      Mental Status: He is alert and oriented to person, place, and time.           ED Course, Procedures, & Data      Procedures       -----  Initiation of Medication for the Treatment of Opioid Use Disorder (OUD) in the Emergency Department (ED)  HCPCS code      Assessment:   Opioid(s) used: fentanyl and suboxone    Amount: 8mg BID suboxone, fentanyl 1-1.5g  Frequency: daily  Route: oral  Duration: years  Last use: 6/6, suboxone yesterday    Other substance(s) with ongoing use: n/a    The patient meets the following DSM-V criteria for Opioid Use Disorder (OUD):   Craving  Withdrawal    (Severity: Mild: 2-3 criteria, Moderate: 4-5 criteria. Severe: 6 or more criteria)  Based on my assessment, the patient has Mild OUD.     Medication Initiated in the ED:  In the ED, treatment for OUD was  initiated. The patient was given 1 dose(s) of  8mg  buprenorphine while in the ED.     Upon ED discharge, outpatient prescription treatment for OUD was initiated.   The patient was prescribed Suboxone and naloxone.      Referral to Ongoing Care and Supportive Services:   Upon ED discharge, the patient was referred to Addiction Medicine for ongoing care and supportive services. The patient was also provided with information on community resources for various supportive services for OUD, and advised to return to the ED if having worsening symptoms.   -----          No results found for any visits on 06/18/24.  Medications   buprenorphine HCl-naloxone HCl (SUBOXONE) 8-2 MG per film 1 Film (1 Film Sublingual $Given 6/18/24 8605)     Labs Ordered and Resulted from Time of ED Arrival to Time of ED Departure - No data to display  No orders to display          Critical care was not performed.     Medical Decision Making  The patient's presentation was of high complexity (a chronic illness severe exacerbation, progression, or side effect of treatment).    The patient's evaluation involved:  review of external note(s) from 2 sources (ED note, psych note)  review of 2 test result(s) ordered prior to this encounter (cbc, bmp)  discussion of management or test interpretation with another health professional (see separate area of note for details)    The patient's management necessitated moderate risk (prescription drug management including medications given in the ED).    Assessment & Plan    This is a 29-year-old male with history of substance use, opiate abuse presenting seeking treatment for opioid withdrawal.  He was previously on Suboxone but ran out of this medication.  Overall he is well-appearing.  His reported symptoms are consistent with opiate withdrawal, lower suspicion for other acute process.  He was given a dose of Suboxone and did report improvement in his symptoms.  Prescriptions for Suboxone and naloxone were  provided, he was seen by the substance use navigator as well.    I was provided the patient a prescription for refill of his Seroquel.  He was stable for discharge.  Return precautions were discussed all questions answered.    I have reviewed the nursing notes. I have reviewed the findings, diagnosis, plan and need for follow up with the patient.    Discharge Medication List as of 6/18/2024 10:54 PM        START taking these medications    Details   buprenorphine HCl-naloxone HCl (SUBOXONE) 8-2 MG per film Place 1 Film under the tongue 2 times daily, Disp-9 Film, R-0, [facility]      naloxone (NARCAN) 4 MG/0.1ML nasal spray Spray 1 spray (4 mg) into one nostril alternating nostrils as needed for opioid reversal every 2-3 minutes until assistance arrives, Disp-2 each, R-0, [facility]      !! QUEtiapine (SEROQUEL) 100 MG tablet Take 1 tablet (100 mg) by mouth at bedtime for 14 days, Disp-14 tablet, R-0, E-Prescribe       !! - Potential duplicate medications found. Please discuss with provider.          Final diagnoses:   Opioid abuse (H)         [facility] EMERGENCY DEPARTMENT  6/18/2024     Wolfgang Arreaga MD  06/19/24 0030

## 2024-06-19 NOTE — ED NOTES
First Step  Program - Initial SUN Consult Note:    Demographics:  Patient name Jaime Mccormick   /Age 1995, 29 year old   Gender/Ethnicity male   The patient's reported race is: White   Chief Complaint Medication Refill     Language of Care English    No     Writer was unit's assigned Substance Use Navigator (SUN) for the shift and was notified by Charge RN at  21:56 PM that Jaime Mccormick presented seeking Mx for addiction treatment in the context of chronic opioid use. Pt approached in  for SUN consult. Pt endorses using Fentanyl 1-1.5g/day. Most recent use identified as approximately 23. Current withdrawal symptoms indicated to be mild and noteworthy for the following, per Pt: Hot/cold sensation     ED provider and primary RN notified of SUN consult and made aware of Pt's current condition/symptoms.    Other information:  Pt reports other substance use notable for: none    Comfort items/interventions provided to Pt by SUN following initial consult: Warm blanket    Pt does have a history of utilizing medication-assisted treatment (MAT) for their opioid use.    Pt wants to pursue treatment options following ED stay: yes   Pt presents from, or with plan to attend, treatment facility: no    Pt contact for follow-up: [phone]

## 2024-06-19 NOTE — ED TRIAGE NOTES
Patient states that his insurance got cut off and he can't see provider.     Suboxone 8mg BID  Quetiapine 150 mg at HS

## 2024-08-17 ENCOUNTER — HOSPITAL ENCOUNTER (EMERGENCY)
Facility: CLINIC | Age: 29
Discharge: HOME | End: 2024-08-17
Attending: EMERGENCY MEDICINE | Admitting: EMERGENCY MEDICINE

## 2024-08-17 VITALS
HEART RATE: 90 BPM | TEMPERATURE: 98.2 F | RESPIRATION RATE: 16 BRPM | WEIGHT: 161 LBS | DIASTOLIC BLOOD PRESSURE: 86 MMHG | BODY MASS INDEX: 25.88 KG/M2 | OXYGEN SATURATION: 100 % | SYSTOLIC BLOOD PRESSURE: 145 MMHG | HEIGHT: 66 IN

## 2024-08-17 DIAGNOSIS — F19.20 CHEMICAL DEPENDENCY (H): ICD-10-CM

## 2024-08-17 DIAGNOSIS — R19.7 DIARRHEA, UNSPECIFIED TYPE: ICD-10-CM

## 2024-08-17 PROCEDURE — 99283 EMERGENCY DEPT VISIT LOW MDM: CPT | Performed by: EMERGENCY MEDICINE

## 2024-08-17 PROCEDURE — 99284 EMERGENCY DEPT VISIT MOD MDM: CPT | Performed by: EMERGENCY MEDICINE

## 2024-08-17 RX ORDER — HYDROXYZINE HYDROCHLORIDE 25 MG/1
25 TABLET, FILM COATED ORAL 2 TIMES DAILY PRN
Qty: 20 TABLET | Refills: 0 | Status: SHIPPED | OUTPATIENT
Start: 2024-08-17

## 2024-08-17 RX ORDER — CLONIDINE HYDROCHLORIDE 0.1 MG/1
0.1 TABLET ORAL 3 TIMES DAILY
Qty: 42 TABLET | Refills: 0 | Status: SHIPPED | OUTPATIENT
Start: 2024-08-17 | End: 2024-08-31

## 2024-08-17 RX ORDER — HYDROXYZINE HYDROCHLORIDE 25 MG/1
25 TABLET, FILM COATED ORAL 2 TIMES DAILY PRN
COMMUNITY
Start: 2024-08-08

## 2024-08-17 ASSESSMENT — ACTIVITIES OF DAILY LIVING (ADL): ADLS_ACUITY_SCORE: 33

## 2024-08-17 ASSESSMENT — COLUMBIA-SUICIDE SEVERITY RATING SCALE - C-SSRS
2. HAVE YOU ACTUALLY HAD ANY THOUGHTS OF KILLING YOURSELF IN THE PAST MONTH?: NO
6. HAVE YOU EVER DONE ANYTHING, STARTED TO DO ANYTHING, OR PREPARED TO DO ANYTHING TO END YOUR LIFE?: NO
1. IN THE PAST MONTH, HAVE YOU WISHED YOU WERE DEAD OR WISHED YOU COULD GO TO SLEEP AND NOT WAKE UP?: NO

## 2024-08-17 NOTE — ED PROVIDER NOTES
[facility] [place]    8/17/24      ED PROVIDER NOTE       History     Chief Complaint   Patient presents with    Medication Refill     Clonidine and Hydroxyzine     HPI  Jaime Mccormick is a 29 year old male who presents to the emergency department seeking medication refill.  He does not have access to his clonidine (0.2 mg 3 times daily) or hydroxyzine (25 mg twice daily) and is seeking to have this refilled.    Patient presents with 1 day of diarrhea.  He denies bloody stools.  He has not had recent antibiotics.  He denies recent unfiltered water consumption.  He denies history of chronic conditions including IBS or inflammatory bowel disease.  He denies fever, chills, or vomiting.  He does not express other concerns he would like addressed other than his medication refill.    Past Medical History  Past Medical History:   Diagnosis Date    Anxiety     Chronic kidney disease     Depressive disorder     Polysubstance abuse (H) 2/1/2020     Past Surgical History:   Procedure Laterality Date    NO HISTORY OF SURGERY       cloNIDine (CATAPRES) 0.1 MG tablet  escitalopram (LEXAPRO) 20 MG tablet  gabapentin (NEURONTIN) 300 MG capsule  hydrOXYzine HCl (ATARAX) 25 MG tablet  hydrOXYzine HCl (ATARAX) 25 MG tablet  QUEtiapine (SEROQUEL) 100 MG tablet  amLODIPine (NORVASC) 10 MG tablet  buprenorphine HCl-naloxone HCl (SUBOXONE) 8-2 MG per film  naloxone (NARCAN) 4 MG/0.1ML nasal spray  PHENobarbital (LUMINAL) 64.8 MG tablet  QUEtiapine (SEROQUEL) 100 MG tablet      Allergies   Allergen Reactions    Amoxicillin Hives    Metoclopramide Hives     Family History  Family History   Problem Relation Age of Onset    Alcoholism Mother     Breast Cancer Paternal Grandmother      Social History   Social History     Tobacco Use    Smoking status: Every Day     Current packs/day: 0.25     Types: Cigarettes, Vaping Device    Smokeless tobacco: Never    Tobacco comments:     Smokes only 1-2 cigs per  "day now; reports he vapes a lot   Substance Use Topics    Alcohol use: Not Currently     Comment: sober since 2018    Drug use: Not Currently     Types: Opiates, Benzodiazepines     Comment: 9 months sober and opiates; benzo sober for a long time      A complete review of systems was performed with pertinent positives and negatives noted in the HPI, and all other systems negative.    Physical Exam   BP: (!) 145/86  Pulse: 90  Temp: 98.2  F (36.8  C)  Resp: 16  Height: 167.6 cm (5' 6\")  Weight: 73 kg (161 lb)  SpO2: 100 %  Physical Exam  Vitals and nursing note reviewed.   Constitutional:       General: He is not in acute distress.     Appearance: He is not ill-appearing or diaphoretic.   HENT:      Head: Normocephalic.   Cardiovascular:      Rate and Rhythm: Normal rate.   Pulmonary:      Effort: Pulmonary effort is normal. No respiratory distress.   Skin:     Findings: No erythema or rash.   Neurological:      General: No focal deficit present.      Mental Status: He is alert.   Psychiatric:         Mood and Affect: Mood normal.         Behavior: Behavior normal.         Thought Content: Thought content normal.              No results found for any visits on 08/17/24.  Medications - No data to display  Labs Ordered and Resulted from Time of ED Arrival to Time of ED Departure - No data to display  No orders to display          Critical care was not performed.     Medical Decision Making  The patient's presentation was of low complexity (an acute and uncomplicated illness or injury).    The patient's evaluation involved:  history and exam without other MDM data elements    The patient's management necessitated moderate risk (prescription drug management including medications given in the ED).    Assessment & Plan      29-year-old male presented to the emergency department evaluation of medication refill requested in setting of 1 day of diarrhea.  Patient reports concern secondary to abrupt discontinuation with lack " of medications of hydroxyzine and clonidine for diarrhea.  This is been several episodes per day.  No abdominal pain, nausea, vomiting.  Benign abdominal examination.  No high risk concern for diarrhea such as blood, overseas travel, ingestion of unfiltered water or recent antibiotic use I do not believe patient requires stool cultures or laboratory studies patient denies wanting the latter.  I did  patient on indications to return.  I have refilled medications pending follow-up with PCP.  Should the patient have change, progression or worsening symptoms we will have him call or return emergently for reevaluation.    I have reviewed the nursing notes. I have reviewed the findings, diagnosis, plan and need for follow up with the patient.    Discharge Medication List as of 8/17/2024  4:58 PM        START taking these medications    Details   !! hydrOXYzine HCl (ATARAX) 25 MG tablet Take 1 tablet (25 mg) by mouth 2 times daily as needed for itching, Disp-20 tablet, R-0, Local [employer]       !! - Potential duplicate medications found. Please discuss with provider.          Final diagnoses:   Diarrhea, unspecified type       Fidencio Lynn  [facility] EMERGENCY DEPARTMENT  8/17/2024     Fidencio Lynn MD  08/17/24 7948

## 2024-08-17 NOTE — ED TRIAGE NOTES
Patient reports being here for a medication refill. Patient reports he does not have insurance right now but is in the process of making an appeal to [facility].     Patient reports he moved two days ago and does not have his Clonidine and Hydroxyzine. Patient is concerned he is having withdrawals from these medications because he takes them everyday and is having diarrhea / shaking.     Patient called the doctor office and was told it would be 2-3 weeks before they could get him in and they advised him if he needed the medication sooner than that to go to the Emergency Room.     Clonidine 0.2 mg TID and Hydroxyzine 25 mg BID.

## 2024-08-17 NOTE — DISCHARGE INSTRUCTIONS
As we discussed together this may be related to your medication abrupt discontinuation.  We have refilled medications.  Please follow-up with your primary care physician for reevaluation and long-term medical management.  Should you have persistent diarrhea lasting more than 1 week, increasing abdominal pain, blood within your diarrhea or other concern we are certainly happy to reevaluate you emergently at any time.

## 2024-11-21 ENCOUNTER — HOSPITAL ENCOUNTER (EMERGENCY)
Facility: CLINIC | Age: 29
Discharge: HOME | End: 2024-11-21
Attending: EMERGENCY MEDICINE | Admitting: EMERGENCY MEDICINE

## 2024-11-21 VITALS
OXYGEN SATURATION: 100 % | HEIGHT: 66 IN | WEIGHT: 170 LBS | TEMPERATURE: 98.4 F | BODY MASS INDEX: 27.32 KG/M2 | DIASTOLIC BLOOD PRESSURE: 95 MMHG | SYSTOLIC BLOOD PRESSURE: 152 MMHG | HEART RATE: 137 BPM | RESPIRATION RATE: 18 BRPM

## 2024-11-21 DIAGNOSIS — F11.90 OPIOID USE DISORDER: ICD-10-CM

## 2024-11-21 DIAGNOSIS — Z76.0 ENCOUNTER FOR MEDICATION REFILL: ICD-10-CM

## 2024-11-21 DIAGNOSIS — F11.23 OPIOID DEPENDENCE WITH WITHDRAWAL (H): ICD-10-CM

## 2024-11-21 PROCEDURE — 99284 EMERGENCY DEPT VISIT MOD MDM: CPT | Performed by: EMERGENCY MEDICINE

## 2024-11-21 PROCEDURE — 250N000012 HC RX MED GY IP 250 OP 636 PS 637: Performed by: EMERGENCY MEDICINE

## 2024-11-21 PROCEDURE — 250N000013 HC RX MED GY IP 250 OP 250 PS 637: Performed by: EMERGENCY MEDICINE

## 2024-11-21 RX ORDER — QUETIAPINE FUMARATE 100 MG/1
100 TABLET, FILM COATED ORAL AT BEDTIME
Qty: 15 TABLET | Refills: 0 | Status: SHIPPED | OUTPATIENT
Start: 2024-11-21 | End: 2024-12-06

## 2024-11-21 RX ORDER — BUPRENORPHINE AND NALOXONE 8; 2 MG/1; MG/1
1 FILM, SOLUBLE BUCCAL; SUBLINGUAL ONCE
Status: COMPLETED | OUTPATIENT
Start: 2024-11-21 | End: 2024-11-21

## 2024-11-21 RX ORDER — CLONIDINE HYDROCHLORIDE 0.1 MG/1
0.2 TABLET ORAL ONCE
Status: COMPLETED | OUTPATIENT
Start: 2024-11-21 | End: 2024-11-21

## 2024-11-21 RX ORDER — CLONIDINE HYDROCHLORIDE 0.2 MG/1
0.2 TABLET ORAL 2 TIMES DAILY
Qty: 30 TABLET | Refills: 0 | Status: SHIPPED | OUTPATIENT
Start: 2024-11-21 | End: 2024-12-06

## 2024-11-21 RX ORDER — HYDROXYZINE HYDROCHLORIDE 25 MG/1
25 TABLET, FILM COATED ORAL 2 TIMES DAILY
Qty: 30 TABLET | Refills: 0 | Status: SHIPPED | OUTPATIENT
Start: 2024-11-21 | End: 2024-12-06

## 2024-11-21 RX ORDER — BUPRENORPHINE AND NALOXONE 8; 2 MG/1; MG/1
1 FILM, SOLUBLE BUCCAL; SUBLINGUAL 2 TIMES DAILY
Qty: 30 FILM | Refills: 0 | Status: SHIPPED | OUTPATIENT
Start: 2024-11-21 | End: 2024-12-06

## 2024-11-21 RX ORDER — GABAPENTIN 400 MG/1
400 CAPSULE ORAL 3 TIMES DAILY
Qty: 45 CAPSULE | Refills: 0 | Status: SHIPPED | OUTPATIENT
Start: 2024-11-21 | End: 2024-12-06

## 2024-11-21 RX ORDER — HYDROXYZINE HYDROCHLORIDE 25 MG/1
25 TABLET, FILM COATED ORAL ONCE
Status: COMPLETED | OUTPATIENT
Start: 2024-11-21 | End: 2024-11-21

## 2024-11-21 RX ADMIN — HYDROXYZINE HYDROCHLORIDE 25 MG: 25 TABLET, FILM COATED ORAL at 23:24

## 2024-11-21 RX ADMIN — BUPRENORPHINE AND NALOXONE 1 FILM: 8; 2 FILM BUCCAL; SUBLINGUAL at 23:23

## 2024-11-21 RX ADMIN — GABAPENTIN 400 MG: 300 CAPSULE ORAL at 23:24

## 2024-11-21 RX ADMIN — CLONIDINE HYDROCHLORIDE 0.2 MG: 0.1 TABLET ORAL at 23:24

## 2024-11-21 ASSESSMENT — ACTIVITIES OF DAILY LIVING (ADL): ADLS_ACUITY_SCORE: 0

## 2024-11-22 NOTE — ED NOTES
First Step  Program - Initial SUN Consult Note:    Demographics:  Patient name Jaime Mccormick   /Age 1995, 29 year old   Gender/Ethnicity male   The patient's reported race is: White   Chief Complaint Medication Refill     Language of Care English    No     Writer was unit's assigned Substance Use Navigator (SUN) for the shift and was notified by ED Provider at  11:25 PM that Jaime Mccormick presented seeking Mx for addiction treatment in the context of chronic opioid use. Pt approached in triage for SUN consult. Pt endorses using Fentanyl. Most recent use identified as over a year ago.    Other information:    Pt does have a history of utilizing medication-assisted treatment (MAT) for their opioid use.    Pt wants to pursue treatment options following ED stay: yes   Pt presents from, or with plan to attend, treatment facility: yes    Pt contact for follow-up: [phone]

## 2024-11-22 NOTE — ED TRIAGE NOTES
Pt reports trembling/shaking.        Triage Assessment (Adult)       Row Name 11/21/24 7188          Triage Assessment    Airway WDL WDL        Respiratory WDL    Respiratory WDL WDL        Skin Circulation/Temperature WDL    Skin Circulation/Temperature WDL X;all        Cardiac WDL    Cardiac WDL WDL;all     Pulse Rate & Regularity tachycardic        Cognitive/Neuro/Behavioral WDL    Cognitive/Neuro/Behavioral WDL WDL
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
## 2024-11-22 NOTE — DISCHARGE INSTRUCTIONS
"Take home medications and your suboxone as below.   Follow-up in [facility]      Buprenorphine (Suboxone) Home Induction Instructions  (instructions adapted from [facility])      Plan to take a day off and have a place to rest.     Stop using and wait until you feel very sick from the withdrawals (at least 12 hours is best, if using fentanyl it may take a few days). You should have at least 3 of the following:  bad chills or sweating  heavy yawning  joint/bone aches  enlarged pupils  runny nose or watery eyes  feeling restless  goose bumps  anxious or irritable  cramps, nausea, vomiting or diarrhea  twitching/tremors/shakes    Dose one or two 8 mg tablets or strips under your tongue (total dose of 8-16 mg).     Make sure the tablet or film fully dissolves under your tongue (takes about 15 minutes). The medication will not work as well if swallowed into the stomach.     After an hour, repeat the dose (another 8-16 mg) to feel well.     The next day, take 16-32 mg (2-4 tablets or films) at one time.          --  If you have started buprenorphine before:  If it went well, that's great! Just do that again.   If it was difficult, talk with your care team to figure out what happened and find ways to make it better this time. You may need a different dosing plan than what is listed here.     If you have never started buprenorphine before:  Gather your support team and if possible take a day off.   You are going to want space to rest. Don't drive.   Using cocaine, methamphetamine, alcohol, or pills makes starting Suboxone/buprenorphine harder, and mixing in alcohol or benzodiazepines can be dangerous.     --  If you have a light habit (e.g. 5 \"Norco 10's\" per day):  Consider a low dose: start with 4 mg and stop and 8 mg total.   WARNING: Withdrawal will continue if you don't take enough buprenorphine.     If you have a heavy habit (e.g. injecting 2 g heroin per day or smoking 1 g fentanyl per day)  Consider " a high dose: start with a first dose of 16 mg  For most people, the effects of buprenorphine max out at around 24-32 mg.   WARNING: Too much buprenorphine can make you feel sick and sleepy.    --  Follow up with your addiction specialist as scheduled for further dosing recommendations and medication refills.     [facility] [id number], [address]  [place] [address]  Phone: [phone]  Fax:  [phone]       Hours:       Monday, Wednesday, Friday     Scheduled visits: 9:00 am - 4:00 pm     Walk-in hours:  9:00 am - 3:00 pm        Tuesday, Thursday     Walk-in only hours: 1:30 pm - 4:00 pm

## 2025-01-21 ENCOUNTER — HOSPITAL ENCOUNTER (EMERGENCY)
Facility: CLINIC | Age: 30
Discharge: HOME | End: 2025-01-21
Attending: EMERGENCY MEDICINE | Admitting: EMERGENCY MEDICINE

## 2025-01-21 VITALS
BODY MASS INDEX: 26.03 KG/M2 | OXYGEN SATURATION: 100 % | HEART RATE: 101 BPM | RESPIRATION RATE: 18 BRPM | DIASTOLIC BLOOD PRESSURE: 84 MMHG | TEMPERATURE: 97.6 F | HEIGHT: 66 IN | WEIGHT: 162 LBS | SYSTOLIC BLOOD PRESSURE: 118 MMHG

## 2025-01-21 DIAGNOSIS — Z76.0 ENCOUNTER FOR MEDICATION REFILL: ICD-10-CM

## 2025-01-21 DIAGNOSIS — F19.20 CHEMICAL DEPENDENCY (H): ICD-10-CM

## 2025-01-21 PROCEDURE — 99282 EMERGENCY DEPT VISIT SF MDM: CPT | Performed by: EMERGENCY MEDICINE

## 2025-01-21 PROCEDURE — 99284 EMERGENCY DEPT VISIT MOD MDM: CPT | Performed by: EMERGENCY MEDICINE

## 2025-01-21 RX ORDER — HYDROXYZINE HYDROCHLORIDE 25 MG/1
25 TABLET, FILM COATED ORAL 2 TIMES DAILY PRN
Qty: 30 TABLET | Refills: 0 | Status: SHIPPED | OUTPATIENT
Start: 2025-01-21 | End: 2025-02-05

## 2025-01-21 RX ORDER — CLONIDINE HYDROCHLORIDE 0.2 MG/1
0.2 TABLET ORAL 2 TIMES DAILY
Qty: 30 TABLET | Refills: 0 | Status: SHIPPED | OUTPATIENT
Start: 2025-01-21

## 2025-01-21 RX ORDER — BUPRENORPHINE AND NALOXONE 8; 2 MG/1; MG/1
1 FILM, SOLUBLE BUCCAL; SUBLINGUAL 2 TIMES DAILY
Qty: 30 FILM | Refills: 0 | Status: SHIPPED | OUTPATIENT
Start: 2025-01-21 | End: 2025-02-05

## 2025-01-21 RX ORDER — GABAPENTIN 400 MG/1
400 CAPSULE ORAL 3 TIMES DAILY
Qty: 45 CAPSULE | Refills: 0 | Status: SHIPPED | OUTPATIENT
Start: 2025-01-21 | End: 2025-02-05

## 2025-01-21 NOTE — DISCHARGE INSTRUCTIONS
Please follow-up with your primary care provider for further refills otherwise return to the emergency department if you have any further concerns.

## 2025-01-21 NOTE — ED TRIAGE NOTES
Patient states that he needs a Medication refill due to temporary insurance ending on the 12/31 waiting for new insurance to kick in.     Patient states he is going through withdrawal. Endorses shakiness and nausea.     medications that patient would like refilled are suboxone, gabapentin, clonidine, and hydroyzine.     Suboxone- 1.5 years. 8mg BID. Last taken last night

## 2025-01-21 NOTE — ED NOTES
[facility] - Initial [facility] Consult Note:    Demographics:  Patient name Jaime Mccormick   /Age 1995, 29 year old   Gender/Ethnicity male   The patient's reported race is: White   Chief Complaint Medication Refill     Language of Care English    No       Other information:    Writer was the only SUN available, and was unable to visit with this patient due to Writer being on a 1:1, no staff available to swap out Writer, two other SUN consult patients in the department at the same time, and this Pt only being at the department for approximately 45 minutes. Pt received a Suboxone Rx while at the department.

## 2025-01-21 NOTE — ED PROVIDER NOTES
"ED Provider Note  [facility]      History     Chief Complaint   Patient presents with    Medication Refill     Suboxone, gabapentin, clonidine, and hydroyzine refills      HPI  Jaime Mccormick is a 29 year old male who has a history of polysubstance abuse presenting with request for refills.  He ran out of his Suboxone, gabapentin, clonidine and hydroxyzine.  He has not used any opioids recently.  He is otherwise asymptomatic.  Denies any mental health concerns.        Physical Exam   BP: 118/84  Pulse: 101  Temp: 97.6  F (36.4  C)  Resp: 18  Height: 167.6 cm (5' 6\")  Weight: 73.5 kg (162 lb)  SpO2: 100 %  Physical Exam  Physical Exam   Constitutional: oriented to person, place, and time. appears well-developed and well-nourished.   HENT:   Head: Normocephalic and atraumatic.   Neck: Normal range of motion.   Pulmonary/Chest: Effort normal. No respiratory distress.   Cardiac: No murmurs, rubs, gallops. RRR.  Abdominal: Abdomen soft, nontender, nondistended. No rebound tenderness.  MSK: Long bones without deformity or evidence of trauma  Neurological: alert and oriented to person, place, and time.   Skin: Skin is warm and dry.   Psychiatric:  normal mood and affect.  behavior is normal. Thought content normal.       ED Course, Procedures, & Data      Procedures          No results found for any visits on 01/21/25.  Medications - No data to display  Labs Ordered and Resulted from Time of ED Arrival to Time of ED Departure - No data to display  No orders to display          Critical care was not performed.     Medical Decision Making  The patient's presentation was of straightforward complexity (a clearly self-limited or minor problem).    The patient's evaluation involved:  history and exam without other MDM data elements    The patient's management necessitated moderate risk (prescription drug management including medications given in the ED) and moderate risk (limitations due to " social determinants of health (financial insecurity, healthcare access difficulty, and substance use)).    Assessment & Plan    Patient presenting with request for refills.  He has no symptoms of withdrawal.  The medications are on his med list.  He did not refill for 2 weeks.  He is working getting back on insurance.  Discussed return precautions.    I have reviewed the nursing notes. I have reviewed the findings, diagnosis, plan and need for follow up with the patient.    Current Discharge Medication List          Final diagnoses:   Encounter for medication refill       Tomy Rice  [facility] EMERGENCY DEPARTMENT  1/21/2025     Tomy Rice MD  01/21/25 1133

## 2025-05-15 ENCOUNTER — HOSPITAL ENCOUNTER (EMERGENCY)
Facility: CLINIC | Age: 30
Discharge: HOME | End: 2025-05-15
Attending: INTERNAL MEDICINE | Admitting: INTERNAL MEDICINE

## 2025-05-15 VITALS
DIASTOLIC BLOOD PRESSURE: 81 MMHG | RESPIRATION RATE: 18 BRPM | SYSTOLIC BLOOD PRESSURE: 138 MMHG | BODY MASS INDEX: 24.53 KG/M2 | OXYGEN SATURATION: 100 % | HEIGHT: 66 IN | TEMPERATURE: 98.1 F | WEIGHT: 152.6 LBS | HEART RATE: 117 BPM

## 2025-05-15 DIAGNOSIS — Z76.0 ENCOUNTER FOR MEDICATION REFILL: ICD-10-CM

## 2025-05-15 PROCEDURE — 99283 EMERGENCY DEPT VISIT LOW MDM: CPT | Performed by: INTERNAL MEDICINE

## 2025-05-15 RX ORDER — CLONIDINE HYDROCHLORIDE 0.1 MG/1
0.2 TABLET ORAL ONCE
Status: COMPLETED | OUTPATIENT
Start: 2025-05-15 | End: 2025-05-15

## 2025-05-15 RX ORDER — CLONIDINE HYDROCHLORIDE 0.2 MG/1
0.2 TABLET ORAL 3 TIMES DAILY
Qty: 21 TABLET | Refills: 0 | Status: SHIPPED | OUTPATIENT
Start: 2025-05-15

## 2025-05-15 ASSESSMENT — COLUMBIA-SUICIDE SEVERITY RATING SCALE - C-SSRS
2. HAVE YOU ACTUALLY HAD ANY THOUGHTS OF KILLING YOURSELF IN THE PAST MONTH?: NO
1. IN THE PAST MONTH, HAVE YOU WISHED YOU WERE DEAD OR WISHED YOU COULD GO TO SLEEP AND NOT WAKE UP?: NO
6. HAVE YOU EVER DONE ANYTHING, STARTED TO DO ANYTHING, OR PREPARED TO DO ANYTHING TO END YOUR LIFE?: YES

## 2025-05-16 NOTE — ED TRIAGE NOTES
Pt presents to the ED for medication refill of 0.2 mg Clonidine. Pt reports taking his last tablet around 1030 this morning. Pt states his psychiatrist, located in [facility], is out of the country and no one has responding to his calls regarding getting a refill.     Triage Assessment (Adult)       Row Name 05/15/25 2006          Triage Assessment    Airway WDL WDL        Respiratory WDL    Respiratory WDL WDL        Skin Circulation/Temperature WDL    Skin Circulation/Temperature WDL WDL        Cardiac WDL    Cardiac WDL WDL        Peripheral/Neurovascular WDL    Peripheral Neurovascular WDL WDL

## 2025-05-16 NOTE — ED PROVIDER NOTES
ED Provider Note  [facility]      History     Chief Complaint   Patient presents with    Medication Refill     HPI  Jaime Mccormick is a 30 year old male with a history of polysubstance abuse who presents to the emergency department for a medication refill. The patient states that he took his last dose on Clonidine this morning. He takes 0.2mg of Clonidine 3 times a day. He has been calling his office for the past few days in an attempt to get it filled but his prescriber is out of office till next week. He denies any other complications.    Past Medical History  Past Medical History:   Diagnosis Date    Anxiety     Chronic kidney disease     Depressive disorder     Polysubstance abuse (H) 2/1/2020     Past Surgical History:   Procedure Laterality Date    NO HISTORY OF SURGERY       cloNIDine (CATAPRES) 0.2 MG tablet  amLODIPine (NORVASC) 10 MG tablet  escitalopram (LEXAPRO) 20 MG tablet  gabapentin (NEURONTIN) 300 MG capsule  gabapentin (NEURONTIN) 400 MG capsule  hydrOXYzine HCl (ATARAX) 25 MG tablet  naloxone (NARCAN) 4 MG/0.1ML nasal spray  PHENobarbital (LUMINAL) 64.8 MG tablet  QUEtiapine (SEROQUEL) 100 MG tablet  QUEtiapine (SEROQUEL) 100 MG tablet  QUEtiapine (SEROQUEL) 100 MG tablet      Allergies   Allergen Reactions    Amoxicillin Hives    Metoclopramide Hives     Family History  Family History   Problem Relation Age of Onset    Alcoholism Mother     Breast Cancer Paternal Grandmother      Social History   Social History     Tobacco Use    Smoking status: Every Day     Current packs/day: 0.25     Types: Cigarettes, Vaping Device    Smokeless tobacco: Never    Tobacco comments:     Smokes only 1-2 cigs per day now; reports he vapes a lot   Substance Use Topics    Alcohol use: Not Currently     Comment: sober since 2018    Drug use: Not Currently     Types: Opiates, Benzodiazepines     Comment: 9 months sober and opiates; benzo sober for a long time      A medically  "appropriate review of systems was performed with pertinent positives and negatives noted in the HPI, and all other systems negative.    Physical Exam   BP: 138/81  Pulse: 117  Temp: 98.1  F (36.7  C)  Resp: 18  Height: 167.6 cm (5' 6\")  Weight: 69.2 kg (152 lb 9.6 oz)  SpO2: 100 %  Physical Exam  Constitutional:       General: He is not in acute distress.     Appearance: Normal appearance. He is not toxic-appearing or diaphoretic.   HENT:      Head: Atraumatic.   Eyes:      General: No scleral icterus.     Conjunctiva/sclera: Conjunctivae normal.      Pupils: Pupils are equal, round, and reactive to light.   Cardiovascular:      Rate and Rhythm: Normal rate and regular rhythm.      Heart sounds: Normal heart sounds.   Pulmonary:      Effort: Pulmonary effort is normal. No respiratory distress.      Breath sounds: Normal breath sounds.   Abdominal:      General: Bowel sounds are normal.      Palpations: Abdomen is soft.      Tenderness: There is no abdominal tenderness.   Musculoskeletal:         General: No tenderness or deformity.      Cervical back: Neck supple.   Skin:     General: Skin is warm and dry.      Findings: No rash.   Neurological:      General: No focal deficit present.      Mental Status: He is alert.      Motor: No weakness.      Coordination: Coordination normal.   Psychiatric:         Mood and Affect: Mood normal.         Behavior: Behavior normal.         Thought Content: Thought content normal.         Judgment: Judgment normal.           ED Course, Procedures, & Data      Procedures            No results found for any visits on 05/15/25.  Medications   cloNIDine (CATAPRES) tablet 0.2 mg (0.2 mg Oral Not Given 5/15/25 2051)     Labs Ordered and Resulted from Time of ED Arrival to Time of ED Departure - No data to display  No orders to display          Critical care was not performed.     Medical Decision Making  The patient's presentation was of low complexity (2+ clearly self-limited or minor " problems).    The patient's evaluation involved:  history and exam without other MDM data elements    The patient's management necessitated moderate risk (prescription drug management including medications given in the ED).    Assessment & Plan    Medication refill for clonidine 0.2 tid as tapering down on suboxone, apparently his MD out of town and no response, last dose this am, given pm dose here then discharge with one week supply, follow up with his PMD for longer term refill.    I have reviewed the nursing notes. I have reviewed the findings, diagnosis, plan and need for follow up with the patient.    Discharge Medication List as of 5/15/2025  8:41 PM          Final diagnoses:   Encounter for medication refill   IGalina, am serving as a trained medical scribe to document services personally performed by Curtis Lamb MD, based on the provider's statements to me.     ICurtis MD, was physically present and have reviewed and verified the accuracy of this note documented by Galina Peter.     Curtis Lamb MD  [facility] EMERGENCY DEPARTMENT  5/15/2025     Curtis Lamb MD  05/15/25 0679

## 2025-06-06 ENCOUNTER — HOSPITAL ENCOUNTER (EMERGENCY)
Facility: CLINIC | Age: 30
Discharge: HOME | End: 2025-06-06
Attending: FAMILY MEDICINE | Admitting: FAMILY MEDICINE

## 2025-06-06 VITALS
HEART RATE: 93 BPM | RESPIRATION RATE: 16 BRPM | TEMPERATURE: 98 F | SYSTOLIC BLOOD PRESSURE: 138 MMHG | DIASTOLIC BLOOD PRESSURE: 86 MMHG | OXYGEN SATURATION: 98 %

## 2025-06-06 DIAGNOSIS — Z76.0 MEDICATION REFILL: ICD-10-CM

## 2025-06-06 PROCEDURE — G2213 INITIAT MED ASSIST TX IN ER: HCPCS | Performed by: FAMILY MEDICINE

## 2025-06-06 PROCEDURE — 99283 EMERGENCY DEPT VISIT LOW MDM: CPT | Performed by: FAMILY MEDICINE

## 2025-06-06 PROCEDURE — 99284 EMERGENCY DEPT VISIT MOD MDM: CPT | Performed by: FAMILY MEDICINE

## 2025-06-06 PROCEDURE — 250N000013 HC RX MED GY IP 250 OP 250 PS 637: Performed by: FAMILY MEDICINE

## 2025-06-06 RX ORDER — GABAPENTIN 300 MG/1
300 CAPSULE ORAL 3 TIMES DAILY
Qty: 42 CAPSULE | Refills: 0 | Status: SHIPPED | OUTPATIENT
Start: 2025-06-06

## 2025-06-06 RX ORDER — GABAPENTIN 300 MG/1
300 CAPSULE ORAL ONCE
Status: COMPLETED | OUTPATIENT
Start: 2025-06-06 | End: 2025-06-06

## 2025-06-06 RX ORDER — BUPRENORPHINE AND NALOXONE 8; 2 MG/1; MG/1
1 FILM, SOLUBLE BUCCAL; SUBLINGUAL DAILY
Qty: 30 FILM | Refills: 0 | Status: SHIPPED | OUTPATIENT
Start: 2025-06-06

## 2025-06-06 RX ORDER — BUPRENORPHINE AND NALOXONE 8; 2 MG/1; MG/1
FILM, SOLUBLE BUCCAL; SUBLINGUAL
COMMUNITY
Start: 2025-05-16

## 2025-06-06 RX ADMIN — GABAPENTIN 300 MG: 300 CAPSULE ORAL at 23:22
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
## 2025-06-07 NOTE — ED TRIAGE NOTES
Patient got home from work [facility] [employer] [facility] and noticed suboxone and gabapentin are missing.     Roommate moved to [facility]. Requesting refill.    Suboxone 8 BID and gabapentin 300 mg TID. Patient unable to see his provider for two weeks

## 2025-07-28 ENCOUNTER — HOSPITAL ENCOUNTER (EMERGENCY)
Facility: CLINIC | Age: 30
Discharge: HOME | End: 2025-07-28
Attending: FAMILY MEDICINE | Admitting: FAMILY MEDICINE
Payer: MEDICAID

## 2025-07-28 VITALS
TEMPERATURE: 98.3 F | HEIGHT: 66 IN | WEIGHT: 153.2 LBS | BODY MASS INDEX: 24.62 KG/M2 | SYSTOLIC BLOOD PRESSURE: 141 MMHG | RESPIRATION RATE: 18 BRPM | OXYGEN SATURATION: 100 % | HEART RATE: 77 BPM | DIASTOLIC BLOOD PRESSURE: 85 MMHG

## 2025-07-28 DIAGNOSIS — F11.20 UNCOMPLICATED OPIOID DEPENDENCE (H): ICD-10-CM

## 2025-07-28 DIAGNOSIS — Z76.0 ENCOUNTER FOR MEDICATION REFILL: Primary | ICD-10-CM

## 2025-07-28 PROCEDURE — 250N000012 HC RX MED GY IP 250 OP 636 PS 637: Performed by: FAMILY MEDICINE

## 2025-07-28 PROCEDURE — 99284 EMERGENCY DEPT VISIT MOD MDM: CPT | Performed by: FAMILY MEDICINE

## 2025-07-28 PROCEDURE — 250N000013 HC RX MED GY IP 250 OP 250 PS 637: Performed by: FAMILY MEDICINE

## 2025-07-28 RX ORDER — GABAPENTIN 300 MG/1
300 CAPSULE ORAL 3 TIMES DAILY
Qty: 21 CAPSULE | Refills: 0 | Status: SHIPPED | OUTPATIENT
Start: 2025-07-28 | End: 2025-07-28

## 2025-07-28 RX ORDER — CLONIDINE HYDROCHLORIDE 0.2 MG/1
0.2 TABLET ORAL 3 TIMES DAILY
Qty: 42 TABLET | Refills: 0 | Status: SHIPPED | OUTPATIENT
Start: 2025-07-28

## 2025-07-28 RX ORDER — BUPRENORPHINE AND NALOXONE 8; 2 MG/1; MG/1
1 FILM, SOLUBLE BUCCAL; SUBLINGUAL ONCE
Status: COMPLETED | OUTPATIENT
Start: 2025-07-28 | End: 2025-07-28

## 2025-07-28 RX ORDER — GABAPENTIN 300 MG/1
300 CAPSULE ORAL ONCE
Status: COMPLETED | OUTPATIENT
Start: 2025-07-28 | End: 2025-07-28

## 2025-07-28 RX ORDER — BUPRENORPHINE AND NALOXONE 8; 2 MG/1; MG/1
1 FILM, SOLUBLE BUCCAL; SUBLINGUAL 3 TIMES DAILY
Qty: 21 FILM | Refills: 0 | Status: SHIPPED | OUTPATIENT
Start: 2025-07-28 | End: 2025-07-28

## 2025-07-28 RX ORDER — CLONIDINE HYDROCHLORIDE 0.2 MG/1
0.2 TABLET ORAL 3 TIMES DAILY
Qty: 21 TABLET | Refills: 0 | Status: SHIPPED | OUTPATIENT
Start: 2025-07-28 | End: 2025-07-28

## 2025-07-28 RX ORDER — GABAPENTIN 300 MG/1
300 CAPSULE ORAL 3 TIMES DAILY
Qty: 42 CAPSULE | Refills: 0 | Status: SHIPPED | OUTPATIENT
Start: 2025-07-28

## 2025-07-28 RX ORDER — BUPRENORPHINE AND NALOXONE 8; 2 MG/1; MG/1
1 FILM, SOLUBLE BUCCAL; SUBLINGUAL 3 TIMES DAILY
Qty: 42 FILM | Refills: 0 | Status: SHIPPED | OUTPATIENT
Start: 2025-07-28

## 2025-07-28 RX ORDER — HYDROXYZINE HYDROCHLORIDE 50 MG/1
25 TABLET, FILM COATED ORAL EVERY 12 HOURS PRN
COMMUNITY
Start: 2025-07-25

## 2025-07-28 RX ORDER — CLONIDINE HYDROCHLORIDE 0.1 MG/1
0.2 TABLET ORAL ONCE
Status: COMPLETED | OUTPATIENT
Start: 2025-07-28 | End: 2025-07-28

## 2025-07-28 RX ADMIN — BUPRENORPHINE AND NALOXONE 1 FILM: 8; 2 FILM BUCCAL; SUBLINGUAL at 22:48

## 2025-07-28 RX ADMIN — GABAPENTIN 300 MG: 300 CAPSULE ORAL at 22:48

## 2025-07-28 RX ADMIN — CLONIDINE HYDROCHLORIDE 0.2 MG: 0.1 TABLET ORAL at 22:48

## 2025-07-28 ASSESSMENT — COLUMBIA-SUICIDE SEVERITY RATING SCALE - C-SSRS
2. HAVE YOU ACTUALLY HAD ANY THOUGHTS OF KILLING YOURSELF IN THE PAST MONTH?: NO
1. IN THE PAST MONTH, HAVE YOU WISHED YOU WERE DEAD OR WISHED YOU COULD GO TO SLEEP AND NOT WAKE UP?: NO
6. HAVE YOU EVER DONE ANYTHING, STARTED TO DO ANYTHING, OR PREPARED TO DO ANYTHING TO END YOUR LIFE?: NO

## 2025-07-29 NOTE — ED TRIAGE NOTES
Patient come sin today seeking refill for gabapentin, clonidine, and suboxone. Last dose was this morning at 9 AM, typically takes all medications TID so he has missed 2 doses.      Triage Assessment (Adult)       Row Name 07/28/25 9019          Triage Assessment    Airway WDL WDL        Respiratory WDL    Respiratory WDL WDL        Skin Circulation/Temperature WDL    Skin Circulation/Temperature WDL WDL        Cardiac WDL    Cardiac WDL WDL        Peripheral/Neurovascular WDL    Peripheral Neurovascular WDL WDL        Cognitive/Neuro/Behavioral WDL    Cognitive/Neuro/Behavioral WDL WDL

## 2025-07-30 NOTE — ED PROVIDER NOTES
ED Provider Note  [facility]      History     Chief Complaint   Patient presents with    Medication Refill     Needs refill for Clonidine, Gabapentin, and Suboxone. Typically takes TID, last dose was this morning at 9 AM, so has missed 2 doses so far.     HPI  Jaime Mccormick is a 30 year old male who arrives emergency room with concerns of running out of his medications including clonidine gabapentin and Suboxone.  Patient is typically taking these 3 times a day and he has missed 2 doses today he is states that he is starting to feel like he may be having some withdrawal.  Patient was not able to get an appointment for his primary provider to who normally prescribes medications until 11 August is hoping that he can get a bridge prescription from us.    Past Medical History  Past Medical History:   Diagnosis Date    Anxiety     Chronic kidney disease     Depressive disorder     Polysubstance abuse (H) 2/1/2020     Past Surgical History:   Procedure Laterality Date    NO HISTORY OF SURGERY       buprenorphine HCl-naloxone HCl (SUBOXONE) 8-2 MG per film  cloNIDine (CATAPRES) 0.2 MG tablet  gabapentin (NEURONTIN) 300 MG capsule  hydrOXYzine HCl (ATARAX) 50 MG tablet  buprenorphine HCl-naloxone HCl (SUBOXONE) 8-2 MG per film  buprenorphine HCl-naloxone HCl (SUBOXONE) 8-2 MG per film  cloNIDine (CATAPRES) 0.2 MG tablet  escitalopram (LEXAPRO) 20 MG tablet  gabapentin (NEURONTIN) 300 MG capsule  gabapentin (NEURONTIN) 300 MG capsule  gabapentin (NEURONTIN) 400 MG capsule  hydrOXYzine HCl (ATARAX) 25 MG tablet  naloxone (NARCAN) 4 MG/0.1ML nasal spray  PHENobarbital (LUMINAL) 64.8 MG tablet  QUEtiapine (SEROQUEL) 100 MG tablet  QUEtiapine (SEROQUEL) 100 MG tablet  QUEtiapine (SEROQUEL) 100 MG tablet      Allergies   Allergen Reactions    Amoxicillin Hives    Metoclopramide Hives     Family History  Family History   Problem Relation Age of Onset    Alcoholism Mother     Breast Cancer  "Paternal Grandmother      Social History   Social History     Tobacco Use    Smoking status: Every Day     Current packs/day: 0.25     Types: Cigarettes, Vaping Device    Smokeless tobacco: Never    Tobacco comments:     Smokes only 1-2 cigs per day now; reports he vapes a lot   Substance Use Topics    Alcohol use: Not Currently     Comment: sober since 2018    Drug use: Not Currently     Types: Opiates, Benzodiazepines     Comment: 9 months sober and opiates; benzo sober for a long time      A medically appropriate review of systems was performed with pertinent positives and negatives noted in the HPI, and all other systems negative.    Physical Exam   BP: (!) 141/85  Pulse: 77  Temp: 98.3  F (36.8  C)  Resp: 18  Height: 167.6 cm (5' 6\")  Weight: 69.5 kg (153 lb 3.2 oz)  SpO2: 100 %  Physical Exam  Constitutional:       General: He is not in acute distress.     Appearance: Normal appearance. He is not toxic-appearing.   HENT:      Head: Atraumatic.   Eyes:      General: No scleral icterus.     Conjunctiva/sclera: Conjunctivae normal.   Cardiovascular:      Rate and Rhythm: Normal rate.      Heart sounds: Normal heart sounds.   Pulmonary:      Effort: Pulmonary effort is normal. No respiratory distress.      Breath sounds: Normal breath sounds.   Abdominal:      Palpations: Abdomen is soft.      Tenderness: There is no abdominal tenderness.   Musculoskeletal:         General: No deformity.      Cervical back: Neck supple.   Skin:     General: Skin is warm.   Neurological:      General: No focal deficit present.      Mental Status: He is alert and oriented to person, place, and time.      Sensory: No sensory deficit.      Motor: No weakness.      Coordination: Coordination normal.   Psychiatric:         Mood and Affect: Mood normal.         Speech: Speech normal.         Behavior: Behavior is cooperative.         Thought Content: Thought content does not include homicidal or suicidal ideation.           ED Course, " Procedures, & Data      Procedures        Medications   buprenorphine HCl-naloxone HCl (SUBOXONE) 8-2 MG per film 1 Film (1 Film Sublingual $Given 7/28/25 2248)   cloNIDine (CATAPRES) tablet 0.2 mg (0.2 mg Oral $Given 7/28/25 2248)   gabapentin (NEURONTIN) capsule 300 mg (300 mg Oral $Given 7/28/25 2248)          Critical care was not performed.     Medical Decision Making  The patient's presentation was of moderate complexity (a chronic illness mild to moderate exacerbation, progression, or side effect of treatment).    The patient's evaluation involved:  history and exam without other MDM data elements    The patient's management necessitated moderate risk (prescription drug management including medications given in the ED).    Assessment & Plan        I have reviewed the nursing notes. I have reviewed the findings, diagnosis, plan and need for follow up with the patient.    Discharge Medication List as of 7/28/2025 10:39 PM        START taking these medications    Details   !! buprenorphine HCl-naloxone HCl (SUBOXONE) 8-2 MG per film Place 1 Film under the tongue 3 times daily., Disp-21 Film, R-0, Local [employer]      !! cloNIDine (CATAPRES) 0.2 MG tablet Take 1 tablet (0.2 mg) by mouth 3 times daily., Disp-21 tablet, R-0, Local Print       !! - Potential duplicate medications found. Please discuss with provider.          Final diagnoses:   Encounter for medication refill   Uncomplicated opioid dependence (H)         [facility] EMERGENCY DEPARTMENT  7/28/2025     Branden Wilkes MD  07/29/25 1589

## 2025-09-03 ENCOUNTER — HOSPITAL ENCOUNTER (EMERGENCY)
Facility: CLINIC | Age: 30
Discharge: HOME | End: 2025-09-03
Payer: COMMERCIAL

## 2025-09-03 VITALS
TEMPERATURE: 96.7 F | RESPIRATION RATE: 16 BRPM | DIASTOLIC BLOOD PRESSURE: 88 MMHG | HEART RATE: 115 BPM | WEIGHT: 153 LBS | OXYGEN SATURATION: 100 % | SYSTOLIC BLOOD PRESSURE: 147 MMHG | BODY MASS INDEX: 24.69 KG/M2

## 2025-09-03 DIAGNOSIS — F19.10 POLYSUBSTANCE ABUSE (H): ICD-10-CM

## 2025-09-03 DIAGNOSIS — F11.20 FENTANYL DEPENDENCE (H): Primary | ICD-10-CM

## 2025-09-03 PROCEDURE — 99284 EMERGENCY DEPT VISIT MOD MDM: CPT

## 2025-09-03 PROCEDURE — 93005 ELECTROCARDIOGRAM TRACING: CPT

## 2025-09-03 RX ORDER — GABAPENTIN 300 MG/1
300 CAPSULE ORAL 3 TIMES DAILY
Qty: 90 CAPSULE | Refills: 0 | Status: SHIPPED | OUTPATIENT
Start: 2025-09-03 | End: 2025-10-03

## 2025-09-03 RX ORDER — HYDROXYZINE HYDROCHLORIDE 25 MG/1
25 TABLET, FILM COATED ORAL 3 TIMES DAILY PRN
Qty: 30 TABLET | Refills: 0 | Status: SHIPPED | OUTPATIENT
Start: 2025-09-03 | End: 2025-10-03

## 2025-09-03 RX ORDER — BUPRENORPHINE AND NALOXONE 8; 2 MG/1; MG/1
1 FILM, SOLUBLE BUCCAL; SUBLINGUAL 3 TIMES DAILY
Qty: 21 FILM | Refills: 0 | Status: SHIPPED | OUTPATIENT
Start: 2025-09-03 | End: 2025-09-10
